# Patient Record
(demographics unavailable — no encounter records)

---

## 2024-10-10 NOTE — BEGINNING OF VISIT
[1] : 1) Little interest or pleasure doing things for several days (1) [0] : 2) Feeling down, depressed, or hopeless: Not at all (0) [PHQ-2 Negative] : PHQ-2 Negative [Pain Scale: ___] : On a scale of 1-10, today the patient's pain is a(n) [unfilled]. [Former] : Former [> 15 Years] : > 15 Years [Reviewed, no changes] : Reviewed, no changes

## 2024-10-11 NOTE — ASSESSMENT
[FreeTextEntry1] : Assessment and Plan: # H/O Bilateral invasive lobular carcinoma of the breast, ER/ME positive and HER2/deana negative, status post bilateral mastectomy, bilateral sentinel lymph node biopsy, and right axillary lymph node dissection, status post adjuvant chemotherapy and adjuvant chest wall radiotherapy, Completed 10-year of endocrine therapy with Letrozole in 7/2023.  - S/P b/l mastectomy. There is no palpable abnormality.  # Recurrent breast cancer(ER/ME/Her 2 negative Biopsy proven) with T6 vertebral body lesion S/P XRT(5/24) now presenting with new L1 lesion S/P XRT  9.24 - MRI spine on 3/11/24 showed a 2.7 cm lesion within the left aspect of the T6 vertebral body and 7 mm marrow replacing lesion within the T11 vertebral body.  - PET/CT on 3/28/24 showed Intense pathologic FDG uptake (SUV 9.3) coregistering with sclerotic lesion on the left side of T6 suspicious for biologic tumor activity. No other sites of abnormal FDG uptake.  - CT guided core bx of the lesion in T6 vertebral body on 4/17/24 showed metastatic carcinoma, favor breast primary, ER/ME negative. Her2 is negative.  - S/P palliative RT 2000cGy to T6 lesion and completed RT on 5/22/24. - She has oligo bone met on the imaging. However, the presence of occult mets cannot be excluded. She started Xeloda 1500 mg twice a day, one week on and one week off. Due to frequent diarrhea, will reduce Xeloda to 1000 mg q12h, one week on and one week off.  She has been on Xeloda for 3 months and developed multiple side effects. Will hold treatment for now. -- PET/CT for re-evaluation in 9.24 which showed interval resolution of uptake in T6 and New uptake in L1 and new pleural effusion. S/P XRT 5 fractions in 9.24. --Given no evidence of recurrent disease elsewhere will monitor for now and will repeat imaging in 2 months. --- Recurrent tumor is noted to be Triple negative. However receptor testing was performed  on Bone biopsy may not be accurate. Given her initial  tumor is positive for ER/ME positive will put her Letrozole in the meantime. -- Mild anemia due to chemo. Will monotor.  -- Order blood work: CBC, BMP, LFT, Mg, CEA and .  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 She has an appt to see neurosurgeon.   # H/O elevated serum calcium. MM panel showed no evidence of M-spike. Serum light chain ration is normal. Repeat serum calcium is normal and PTH is also normal.  # Osteopenia.  Encourage regular exercise. Her serum calcium level was mild elevated, 10.4 from latest blood work. Will monitor calcium today.  RTO for follow up in 4 weeks.

## 2024-10-11 NOTE — PHYSICAL EXAM
[Fully active, able to carry on all pre-disease performance without restriction] : Status 0 - Fully active, able to carry on all pre-disease performance without restriction [Normal] : affect appropriate [de-identified] :  Status post bilateral mastectomy and autologous tissue reconstruction.  There is no skin lesion.  No palpable axillary lymphadenopathy. [de-identified] : Lower abdominal fat necrosis. Abdominal scar present.  [de-identified] : right breast skin tag removed; biopsy benign

## 2024-10-11 NOTE — HISTORY OF PRESENT ILLNESS
[de-identified] : This 68-year-old female is here today for a followup visit.  She has a history of bilateral invasive lobular carcinoma, stage IIIA (pT1c N2a M0) in the right breast, and stage IA (pT1b N0 M0) in the left breast.  Both sided breast cancer were ER/SD positive and HER2/deana negative.  She had bilateral mastectomy, bilateral sentinel lymph node biopsy, and right axillary lymph node dissection at [facility] on 01/08/2013.  She received adjuvant chemotherapy with Adriamycin and Cytoxan followed by paclitaxel.  She also received post mastectomy adjuvant radiotherapy to right side chest wall.  She has been on adjuvant endocrine therapy with Femara since 7/2013  and has been tolerating well.  She had genetic test for BRCA mutations.  She is negative for BRCA1/2 mutations.  In 10/2014, she had a bone density at [facility], which showed osteopenia. Her latest bone density was done 7/21/16 which showed osteopenia. MRI of breasts was done on 1/30/17 which showed no MR evidence of malignancy in either breast. Recommendation: Unless otherwise indicated by clinical findings, annual screening mammography recommended. This can be alternated at 6 month intervals with bilateral screening breast MRI.\par   She has been taking calcium and vitamin D supplements. Latest colonoscopy 11/2016 pt states it was negative. Saw Dr. Garcia 1/2017.\par  She saw Dr. Rankin recently. She is scheduled to have a revision of her lower abdominal fat necrosis on 1/2018.\par  \par   [de-identified] : 6/8/18: She stopped letrozole few months ago as she was found to be in Afib by her cardiologist on Pre-op clearance for abdominal fat necrosis. However her cardiologist () told her that you can resume Letrozole. She had surgery for abdominal fat necrosis and she is going for surgery for incisional hernia by .   12/13/18: The patient is here for followup visit. She has been taking Letrozole daily (since 7/2013) and tolerating well. She had hernia repair surgery. She complains some pain in her knees. She had MRI breast in 1 2017. There was no suspicious finding.  6/14/19; The patient is here for followup visit. She has been taking Letrozole daily (since 7/2013) and tolerating well. She complains some pain in her knees. She does not have other new complains. She has mild elevated calcium. PHT was normal.  12/13/2019 :  The patient is here for follow up. She has been on letrozole daily and tolerating well. She had a bone density 12/2/109 which showed osteopenia of femur and hip. She is not taking calcium and vit D since her last calcium was elevated 10.7. She had a recent US breasts 12/2019 for breast pain which was negative. She was seen by her cardiologist and had blood work in the summer. She was told that she has severe anemia. She started her on iron pills. She denies any vaginal bleeding or rectal bleeding  6/12/2020: The patient is here for follow up. She had bilateral invasive lobular carcinoma of the breast, ER/AZ positive and HER2/deana negative, status post bilateral mastectomy, bilateral sentinel lymph node biopsy, and right axillary lymph node dissection in 1/2013, status post adjuvant chemotherapy and adjuvant chest wall radiotherapy, currently on adjuvant endocrine therapy with letrozole and tolerating well. She had a bone density 12/2/19 which showed osteopenia of femur and hip. She is not taking calcium and vit D since her last calcium was elevated 10.7. She had a recent US breasts 12/2019 for breast pain which was negative. She does not have new complains.  12/02/2020: MANOJ is here for follow up visit for bilateral invasive lobular carcinoma of the breast, ER/AZ positive and HER2/deana negative, status post bilateral mastectomy, bilateral sentinel lymph node biopsy, and right axillary lymph node dissection in 1/2013, status post adjuvant chemotherapy and adjuvant chest wall radiotherapy, currently on adjuvant endocrine therapy with letrozole and tolerating well besides mild arthralgia. She had a bone density 12/2/19 which showed osteopenia of femur and hip. She continues on Vitamin D daily. She denies any new breast symptoms at this time. In addition, patient was diagnosed with COVID in 3/2020.   06/02/2021: MANOJ is here for follow up visit for bilateral invasive lobular carcinoma of the breast, ER/AZ positive and HER2/deana negative, status post bilateral mastectomy, bilateral sentinel lymph node biopsy, and right axillary lymph node dissection in 1/2013, status post adjuvant chemotherapy and adjuvant chest wall radiotherapy, currently on adjuvant endocrine therapy with letrozole since 7/2013 and tolerating well besides mild arthralgia. She had a bone density 12/2/19 which showed osteopenia of femur and hip. She continues on Vitamin D daily. She denies any new breast symptoms at this time. she had right breast skin tag biopsied by dermatologist; which was benign.   1/5/22 MANOJ is here for follow up visit for bilateral invasive lobular carcinoma of the breast, ER/AZ positive and HER2/deana negative, status post bilateral mastectomy, bilateral sentinel lymph node biopsy, and right axillary lymph node dissection in 1/2013, status post adjuvant chemotherapy and adjuvant chest wall radiotherapy, currently on adjuvant endocrine therapy with letrozole since 7/2013 and tolerating well besides mild arthralgia. She had a bone density 12/7/21 which showed worsening osteopenia of femur and hip, T score -2.4. She is not complaint with Vitamin D, does not take calcium due to mildly elevated Calcium. She denies any new breast symptoms at this time. She was experiencing lower abdominal and pelvic pain in Sept had US which was unremarkable. In addition, had Thyroid US due to hypercalcemia which was unremarkable, In 8/2021 TSH 2.36, Calcium 10, and PTH was 78.   7/21/22: Manoj is here for follow up visit for bilateral invasive lobular carcinoma of the breast, ER/AZ positive and HER2/deana negative, status post bilateral mastectomy, bilateral sentinel lymph node biopsy, and right axillary lymph node dissection in 1/2013, status post adjuvant chemotherapy and adjuvant chest wall radiotherapy, currently on adjuvant endocrine therapy with letrozole since 7/2013 and tolerating well besides mild arthralgia. She had a bone density 12/7/21 which showed worsening osteopenia of femur and hip, T score -2.4. She takes Vitamin D but not calcium because her calcium has been mildly elevated. She was found to have large hiatal hernia and she has had on and off diarrhea for long time. She has been seen by Dr. Borjas.   01/26/2023 Manoj is here for follow up visit for bilateral invasive lobular carcinoma of the breast, ER/AZ positive and HER2/deana negative, status post bilateral mastectomy, bilateral sentinel lymph node biopsy, and right axillary lymph node dissection in 1/2013, status post adjuvant chemotherapy and adjuvant chest wall radiotherapy, currently on adjuvant endocrine therapy with letrozole since 7/2013 and tolerating well besides mild arthralgia. She had a bone density 12/7/21 which showed worsening osteopenia of femur and hip, T score -2.4. She takes Vitamin D but not calcium because her calcium has been mildly elevated. She was found to have large hiatal hernia and surgery on 11/21/22. Her Hb drooped to 9.5 g/dl after surgery. She was found to have high serum calcium but repeat calcium was normal. PTH was also within normal limit.  7/20/23 Manoj is here for follow up visit for bilateral invasive lobular carcinoma of the breast, ER/AZ positive and HER2/deana negative, status post bilateral mastectomy, bilateral sentinel lymph node biopsy, and right axillary lymph node dissection in 1/2013, status post adjuvant chemotherapy and adjuvant chest wall radiotherapy, currently on adjuvant endocrine therapy with letrozole since 7/2013 and tolerating well besides mild arthralgia. She had a bone density 12/7/21 which showed worsening osteopenia of femur and hip, T score -2.4. She takes Vitamin D but not calcium because her calcium has been mildly elevated. Repeat serum calcium normal, MM panel normal. She was found to have large hiatal hernia and surgery on 11/21/22. Her Hb drooped to 9.5 g/dl after surgery, now normal. After hernia repair she was having symptoms consistent with vagal nerve mediated gastroparesis, she then had endoscopic pyloromyotomy, symptoms resolved.   1/25/2024: Manoj is here for follow up visit for bilateral invasive lobular carcinoma of the breast, ER/AZ positive and HER2/deana negative, status post bilateral mastectomy, bilateral sentinel lymph node biopsy, and right axillary lymph node dissection in 1/2013, status post adjuvant chemotherapy and adjuvant chest wall radiotherapy, currently on adjuvant endocrine therapy with letrozole since 7/2013 and tolerating well besides mild arthralgia. She Completed 10 years treatment of Letrozole in 7/2023. She had a bone density 12/8/23 Showed:         ----------------------------------------------------------------- AP Spine (L1-L4)         0.998   -0.4      1.8     Normal Femoral Neck (Left) 0.552   -2.7     -0.7     Osteoporosis Total Hip (Left) 0.676   -2.2     -0.6     Osteopenia -which showed Osteoporosis of femur Neck and Osteopenia in the Hip. She takes Vitamin D.  After hernia repair, she was having symptoms consistent with vagal nerve mediated gastroparesis, she then had endoscopic pyloromyotomy, symptoms resolved.  She starts feeling better now.   3/29/24: Manoj is here for follow up visit. She has h/o bilateral invasive lobular carcinoma of the breast, ER/AZ positive and HER2/deana negative, status post bilateral mastectomy, bilateral sentinel lymph node biopsy, and right axillary lymph node dissection in 1/2013, status post adjuvant chemotherapy and adjuvant chest wall radiotherapy, currently on adjuvant endocrine therapy with letrozole since 7/2013. She had CT AP in 2/2024 for abdominal pain which incidentally showed a lesion in T6. She had MRI spine on 3/11/24 which showed a 2.7 cm lesion within the left aspect of the T6 vertebral body and 7 mm marrow replacing lesion within the T11 vertebral body.  PET/CT on 3/28/24 showed Intense pathologic FDG uptake (SUV 9.3) coregistering with sclerotic lesion on the left side of T6 suspicious for biologic tumor activity. No other sites of abnormal FDG uptake. She is here today to discuss further management plan.  4/29/24: Manoj is here for follow up visit. She has h/o bilateral invasive lobular carcinoma of the breast, ER/AZ positive and HER2/deana negative, status post bilateral mastectomy, bilateral sentinel lymph node biopsy, and right axillary lymph node dissection in 1/2013, status post adjuvant chemotherapy and adjuvant chest wall radiotherapy, Completed 10-year of endocrine therapy with Letrozole in 7/2023. She had CT AP in 2/2024 for abdominal pain which incidentally showed a lesion in T6. She had MRI spine on 3/11/24 which showed a 2.7 cm lesion within the left aspect of the T6 vertebral body and 7 mm marrow replacing lesion within the T11 vertebral body.  PET/CT on 3/28/24 showed Intense pathologic FDG uptake (SUV 9.3) coregistering with sclerotic lesion on the left side of T6 suspicious for biologic tumor activity. No other sites of abnormal FDG uptake. She had CT guided biopsy on 4/17/24 and is here today to discuss the result.     5/30/24: Manoj is here for follow up visit. She has h/o bilateral invasive lobular carcinoma of the breast, ER/AZ positive and HER2/deana negative, status post bilateral mastectomy, bilateral sentinel lymph node biopsy, and right axillary lymph node dissection in 1/2013, status post adjuvant chemotherapy and adjuvant chest wall radiotherapy, Completed 10-year of endocrine therapy with Letrozole in 7/2023. She had CT AP in 2/2024 for abdominal pain which incidentally showed a lesion in T6. She had MRI spine on 3/11/24 which showed a 2.7 cm lesion within the left aspect of the T6 vertebral body and 7 mm marrow replacing lesion within the T11 vertebral body.  PET/CT on 3/28/24 showed Intense pathologic FDG uptake (SUV 9.3) coregistering with sclerotic lesion on the left side of T6 suspicious for biologic tumor activity.  She had CT guided biopsy on 4/17/24 which showed metastatic carcinoma consistent from breast primary. She saw Dr. Anaya and received palliative RT 2000cGy to T6 lesion and completed RT on 5/22/24.  6/27/24: Manoj is here for follow up visit. She has h/o bilateral invasive lobular carcinoma of the breast, ER/AZ positive and HER2/deana negative, status post bilateral mastectomy, bilateral sentinel lymph node biopsy, and right axillary lymph node dissection in 1/2013, status post adjuvant chemotherapy and adjuvant chest wall radiotherapy, Completed 10-year of endocrine therapy with Letrozole in 7/2023. She had CT AP in 2/2024 for abdominal pain which incidentally showed a lesion in T6. She had MRI spine on 3/11/24 which showed a 2.7 cm lesion within the left aspect of the T6 vertebral body and 7 mm marrow replacing lesion within the T11 vertebral body.  PET/CT on 3/28/24 showed Intense pathologic FDG uptake (SUV 9.3) coregistering with sclerotic lesion on the left side of T6 suspicious for biologic tumor activity.  She had CT guided biopsy on 4/17/24 which showed metastatic carcinoma consistent from breast primary. She saw Dr. Anaya and received palliative RT 2000cGy to T6 lesion and completed RT on 5/22/24. She started on Xeloda and had 1st week treatment. She had loose stool but otherwise tolerated well.   08/01/24: bilateral sentinel lymph node biopsy, and right axillary lymph node dissection in 1/2013, status post adjuvant chemotherapy and adjuvant chest wall radiotherapy, Completed 10-year of endocrine therapy with Letrozole in 7/2023. She had CT AP in 2/2024 for abdominal pain which incidentally showed a lesion in T6. She had MRI spine on 3/11/24 which showed a 2.7 cm lesion within the left aspect of the T6 vertebral body and 7 mm marrow replacing lesion within the T11 vertebral body.  PET/CT on 3/28/24 showed Intense pathologic FDG uptake (SUV 9.3) coregistering with sclerotic lesion on the left side of T6 suspicious for biologic tumor activity.  She had CT guided biopsy on 4/17/24 which showed metastatic carcinoma consistent from breast primary. She saw Dr. Anaya and received palliative RT 2000cGy to T6 lesion and completed RT on 5/22/24. She started on Xeloda and had 1st week of June 2024. She is complaining of persistence diarrhea despite Imodium intake, currently on Xeloda 3tabs every 12 hrs for one week on and one week off.   8/29/24: Manoj is here for follow up visit. She has h/o bilateral invasive lobular carcinoma of the breast, ER/AZ positive and HER2/deana negative, status post bilateral mastectomy, bilateral sentinel lymph node biopsy, and right axillary lymph node dissection in 1/2013, status post adjuvant chemotherapy and adjuvant chest wall radiotherapy, Completed 10-year of endocrine therapy with Letrozole in 7/2023. She had CT AP in 2/2024 for abdominal pain which incidentally showed a lesion in T6. She had MRI spine on 3/11/24 which showed a 2.7 cm lesion within the left aspect of the T6 vertebral body and 7 mm marrow replacing lesion within the T11 vertebral body.  PET/CT on 3/28/24 showed Intense pathologic FDG uptake (SUV 9.3) coregistering with sclerotic lesion on the left side of T6 suspicious for biologic tumor activity.  She had CT guided biopsy on 4/17/24 which showed metastatic carcinoma consistent from breast primary. She saw Dr. Anaya and received palliative RT 2000cGy to T6 lesion and completed RT on 5/22/24. She has been on Xeloda and did not tolerate well. She complains diarrhea, weight loss and eye irritation.   10/10/24 Manoj is here for follow up visit. She has h/o bilateral invasive lobular carcinoma of the breast, ER/AZ positive and HER2/deana negative, status post bilateral mastectomy, bilateral sentinel lymph node biopsy, and right axillary lymph node dissection in 1/2013, status post adjuvant chemotherapy and adjuvant chest wall radiotherapy, Completed 10-year of endocrine therapy with Letrozole in 7/2023. She had CT AP in 2/2024 for abdominal pain which incidentally showed a lesion in T6. She had MRI spine on 3/11/24 which showed a 2.7 cm lesion within the left aspect of the T6 vertebral body and 7 mm marrow replacing lesion within the T11 vertebral body.  PET/CT on 3/28/24 showed Intense pathologic FDG uptake (SUV 9.3) coregistering with sclerotic lesion on the left side of T6 suspicious for biologic tumor activity.  She had CT guided biopsy on 4/17/24 which showed metastatic carcinoma consistent from breast primary. She saw Dr. Anaya and received palliative RT 2000cGy to T6 lesion and completed RT on 5/22/24. She has been on Xeloda and did not tolerate well. Xeloda was discontinued. She had a PET scan in 9.24 which showed interval resolution of uptake in T6 and New uptake in L1 and new pleural effussion. SHe completed XRT 5 fractions in 9.24.  She is here for follow up.

## 2024-10-11 NOTE — HISTORY OF PRESENT ILLNESS
[de-identified] : This 68-year-old female is here today for a followup visit.  She has a history of bilateral invasive lobular carcinoma, stage IIIA (pT1c N2a M0) in the right breast, and stage IA (pT1b N0 M0) in the left breast.  Both sided breast cancer were ER/WA positive and HER2/deana negative.  She had bilateral mastectomy, bilateral sentinel lymph node biopsy, and right axillary lymph node dissection at [facility] on 01/08/2013.  She received adjuvant chemotherapy with Adriamycin and Cytoxan followed by paclitaxel.  She also received post mastectomy adjuvant radiotherapy to right side chest wall.  She has been on adjuvant endocrine therapy with Femara since 7/2013  and has been tolerating well.  She had genetic test for BRCA mutations.  She is negative for BRCA1/2 mutations.  In 10/2014, she had a bone density at [facility], which showed osteopenia. Her latest bone density was done 7/21/16 which showed osteopenia. MRI of breasts was done on 1/30/17 which showed no MR evidence of malignancy in either breast. Recommendation: Unless otherwise indicated by clinical findings, annual screening mammography recommended. This can be alternated at 6 month intervals with bilateral screening breast MRI.\par   She has been taking calcium and vitamin D supplements. Latest colonoscopy 11/2016 pt states it was negative. Saw Dr. Garcia 1/2017.\par  She saw Dr. Rankin recently. She is scheduled to have a revision of her lower abdominal fat necrosis on 1/2018.\par  \par   [de-identified] : 6/8/18: She stopped letrozole few months ago as she was found to be in Afib by her cardiologist on Pre-op clearance for abdominal fat necrosis. However her cardiologist () told her that you can resume Letrozole. She had surgery for abdominal fat necrosis and she is going for surgery for incisional hernia by .   12/13/18: The patient is here for followup visit. She has been taking Letrozole daily (since 7/2013) and tolerating well. She had hernia repair surgery. She complains some pain in her knees. She had MRI breast in 1 2017. There was no suspicious finding.  6/14/19; The patient is here for followup visit. She has been taking Letrozole daily (since 7/2013) and tolerating well. She complains some pain in her knees. She does not have other new complains. She has mild elevated calcium. PHT was normal.  12/13/2019 :  The patient is here for follow up. She has been on letrozole daily and tolerating well. She had a bone density 12/2/109 which showed osteopenia of femur and hip. She is not taking calcium and vit D since her last calcium was elevated 10.7. She had a recent US breasts 12/2019 for breast pain which was negative. She was seen by her cardiologist and had blood work in the summer. She was told that she has severe anemia. She started her on iron pills. She denies any vaginal bleeding or rectal bleeding  6/12/2020: The patient is here for follow up. She had bilateral invasive lobular carcinoma of the breast, ER/VA positive and HER2/deana negative, status post bilateral mastectomy, bilateral sentinel lymph node biopsy, and right axillary lymph node dissection in 1/2013, status post adjuvant chemotherapy and adjuvant chest wall radiotherapy, currently on adjuvant endocrine therapy with letrozole and tolerating well. She had a bone density 12/2/19 which showed osteopenia of femur and hip. She is not taking calcium and vit D since her last calcium was elevated 10.7. She had a recent US breasts 12/2019 for breast pain which was negative. She does not have new complains.  12/02/2020: MANOJ is here for follow up visit for bilateral invasive lobular carcinoma of the breast, ER/VA positive and HER2/deana negative, status post bilateral mastectomy, bilateral sentinel lymph node biopsy, and right axillary lymph node dissection in 1/2013, status post adjuvant chemotherapy and adjuvant chest wall radiotherapy, currently on adjuvant endocrine therapy with letrozole and tolerating well besides mild arthralgia. She had a bone density 12/2/19 which showed osteopenia of femur and hip. She continues on Vitamin D daily. She denies any new breast symptoms at this time. In addition, patient was diagnosed with COVID in 3/2020.   06/02/2021: MANOJ is here for follow up visit for bilateral invasive lobular carcinoma of the breast, ER/VA positive and HER2/deana negative, status post bilateral mastectomy, bilateral sentinel lymph node biopsy, and right axillary lymph node dissection in 1/2013, status post adjuvant chemotherapy and adjuvant chest wall radiotherapy, currently on adjuvant endocrine therapy with letrozole since 7/2013 and tolerating well besides mild arthralgia. She had a bone density 12/2/19 which showed osteopenia of femur and hip. She continues on Vitamin D daily. She denies any new breast symptoms at this time. she had right breast skin tag biopsied by dermatologist; which was benign.   1/5/22 MANOJ is here for follow up visit for bilateral invasive lobular carcinoma of the breast, ER/VA positive and HER2/deana negative, status post bilateral mastectomy, bilateral sentinel lymph node biopsy, and right axillary lymph node dissection in 1/2013, status post adjuvant chemotherapy and adjuvant chest wall radiotherapy, currently on adjuvant endocrine therapy with letrozole since 7/2013 and tolerating well besides mild arthralgia. She had a bone density 12/7/21 which showed worsening osteopenia of femur and hip, T score -2.4. She is not complaint with Vitamin D, does not take calcium due to mildly elevated Calcium. She denies any new breast symptoms at this time. She was experiencing lower abdominal and pelvic pain in Sept had US which was unremarkable. In addition, had Thyroid US due to hypercalcemia which was unremarkable, In 8/2021 TSH 2.36, Calcium 10, and PTH was 78.   7/21/22: Manoj is here for follow up visit for bilateral invasive lobular carcinoma of the breast, ER/VA positive and HER2/deana negative, status post bilateral mastectomy, bilateral sentinel lymph node biopsy, and right axillary lymph node dissection in 1/2013, status post adjuvant chemotherapy and adjuvant chest wall radiotherapy, currently on adjuvant endocrine therapy with letrozole since 7/2013 and tolerating well besides mild arthralgia. She had a bone density 12/7/21 which showed worsening osteopenia of femur and hip, T score -2.4. She takes Vitamin D but not calcium because her calcium has been mildly elevated. She was found to have large hiatal hernia and she has had on and off diarrhea for long time. She has been seen by Dr. Borjas.   01/26/2023 Manoj is here for follow up visit for bilateral invasive lobular carcinoma of the breast, ER/VA positive and HER2/deana negative, status post bilateral mastectomy, bilateral sentinel lymph node biopsy, and right axillary lymph node dissection in 1/2013, status post adjuvant chemotherapy and adjuvant chest wall radiotherapy, currently on adjuvant endocrine therapy with letrozole since 7/2013 and tolerating well besides mild arthralgia. She had a bone density 12/7/21 which showed worsening osteopenia of femur and hip, T score -2.4. She takes Vitamin D but not calcium because her calcium has been mildly elevated. She was found to have large hiatal hernia and surgery on 11/21/22. Her Hb drooped to 9.5 g/dl after surgery. She was found to have high serum calcium but repeat calcium was normal. PTH was also within normal limit.  7/20/23 Manoj is here for follow up visit for bilateral invasive lobular carcinoma of the breast, ER/VA positive and HER2/deana negative, status post bilateral mastectomy, bilateral sentinel lymph node biopsy, and right axillary lymph node dissection in 1/2013, status post adjuvant chemotherapy and adjuvant chest wall radiotherapy, currently on adjuvant endocrine therapy with letrozole since 7/2013 and tolerating well besides mild arthralgia. She had a bone density 12/7/21 which showed worsening osteopenia of femur and hip, T score -2.4. She takes Vitamin D but not calcium because her calcium has been mildly elevated. Repeat serum calcium normal, MM panel normal. She was found to have large hiatal hernia and surgery on 11/21/22. Her Hb drooped to 9.5 g/dl after surgery, now normal. After hernia repair she was having symptoms consistent with vagal nerve mediated gastroparesis, she then had endoscopic pyloromyotomy, symptoms resolved.   1/25/2024: Manoj is here for follow up visit for bilateral invasive lobular carcinoma of the breast, ER/VA positive and HER2/deana negative, status post bilateral mastectomy, bilateral sentinel lymph node biopsy, and right axillary lymph node dissection in 1/2013, status post adjuvant chemotherapy and adjuvant chest wall radiotherapy, currently on adjuvant endocrine therapy with letrozole since 7/2013 and tolerating well besides mild arthralgia. She Completed 10 years treatment of Letrozole in 7/2023. She had a bone density 12/8/23 Showed:         ----------------------------------------------------------------- AP Spine (L1-L4)         0.998   -0.4      1.8     Normal Femoral Neck (Left) 0.552   -2.7     -0.7     Osteoporosis Total Hip (Left) 0.676   -2.2     -0.6     Osteopenia -which showed Osteoporosis of femur Neck and Osteopenia in the Hip. She takes Vitamin D.  After hernia repair, she was having symptoms consistent with vagal nerve mediated gastroparesis, she then had endoscopic pyloromyotomy, symptoms resolved.  She starts feeling better now.   3/29/24: Manoj is here for follow up visit. She has h/o bilateral invasive lobular carcinoma of the breast, ER/VA positive and HER2/deana negative, status post bilateral mastectomy, bilateral sentinel lymph node biopsy, and right axillary lymph node dissection in 1/2013, status post adjuvant chemotherapy and adjuvant chest wall radiotherapy, currently on adjuvant endocrine therapy with letrozole since 7/2013. She had CT AP in 2/2024 for abdominal pain which incidentally showed a lesion in T6. She had MRI spine on 3/11/24 which showed a 2.7 cm lesion within the left aspect of the T6 vertebral body and 7 mm marrow replacing lesion within the T11 vertebral body.  PET/CT on 3/28/24 showed Intense pathologic FDG uptake (SUV 9.3) coregistering with sclerotic lesion on the left side of T6 suspicious for biologic tumor activity. No other sites of abnormal FDG uptake. She is here today to discuss further management plan.  4/29/24: Manoj is here for follow up visit. She has h/o bilateral invasive lobular carcinoma of the breast, ER/VA positive and HER2/deana negative, status post bilateral mastectomy, bilateral sentinel lymph node biopsy, and right axillary lymph node dissection in 1/2013, status post adjuvant chemotherapy and adjuvant chest wall radiotherapy, Completed 10-year of endocrine therapy with Letrozole in 7/2023. She had CT AP in 2/2024 for abdominal pain which incidentally showed a lesion in T6. She had MRI spine on 3/11/24 which showed a 2.7 cm lesion within the left aspect of the T6 vertebral body and 7 mm marrow replacing lesion within the T11 vertebral body.  PET/CT on 3/28/24 showed Intense pathologic FDG uptake (SUV 9.3) coregistering with sclerotic lesion on the left side of T6 suspicious for biologic tumor activity. No other sites of abnormal FDG uptake. She had CT guided biopsy on 4/17/24 and is here today to discuss the result.     5/30/24: Manoj is here for follow up visit. She has h/o bilateral invasive lobular carcinoma of the breast, ER/VA positive and HER2/deana negative, status post bilateral mastectomy, bilateral sentinel lymph node biopsy, and right axillary lymph node dissection in 1/2013, status post adjuvant chemotherapy and adjuvant chest wall radiotherapy, Completed 10-year of endocrine therapy with Letrozole in 7/2023. She had CT AP in 2/2024 for abdominal pain which incidentally showed a lesion in T6. She had MRI spine on 3/11/24 which showed a 2.7 cm lesion within the left aspect of the T6 vertebral body and 7 mm marrow replacing lesion within the T11 vertebral body.  PET/CT on 3/28/24 showed Intense pathologic FDG uptake (SUV 9.3) coregistering with sclerotic lesion on the left side of T6 suspicious for biologic tumor activity.  She had CT guided biopsy on 4/17/24 which showed metastatic carcinoma consistent from breast primary. She saw Dr. Anaya and received palliative RT 2000cGy to T6 lesion and completed RT on 5/22/24.  6/27/24: Manoj is here for follow up visit. She has h/o bilateral invasive lobular carcinoma of the breast, ER/VA positive and HER2/deana negative, status post bilateral mastectomy, bilateral sentinel lymph node biopsy, and right axillary lymph node dissection in 1/2013, status post adjuvant chemotherapy and adjuvant chest wall radiotherapy, Completed 10-year of endocrine therapy with Letrozole in 7/2023. She had CT AP in 2/2024 for abdominal pain which incidentally showed a lesion in T6. She had MRI spine on 3/11/24 which showed a 2.7 cm lesion within the left aspect of the T6 vertebral body and 7 mm marrow replacing lesion within the T11 vertebral body.  PET/CT on 3/28/24 showed Intense pathologic FDG uptake (SUV 9.3) coregistering with sclerotic lesion on the left side of T6 suspicious for biologic tumor activity.  She had CT guided biopsy on 4/17/24 which showed metastatic carcinoma consistent from breast primary. She saw Dr. Anaya and received palliative RT 2000cGy to T6 lesion and completed RT on 5/22/24. She started on Xeloda and had 1st week treatment. She had loose stool but otherwise tolerated well.   08/01/24: bilateral sentinel lymph node biopsy, and right axillary lymph node dissection in 1/2013, status post adjuvant chemotherapy and adjuvant chest wall radiotherapy, Completed 10-year of endocrine therapy with Letrozole in 7/2023. She had CT AP in 2/2024 for abdominal pain which incidentally showed a lesion in T6. She had MRI spine on 3/11/24 which showed a 2.7 cm lesion within the left aspect of the T6 vertebral body and 7 mm marrow replacing lesion within the T11 vertebral body.  PET/CT on 3/28/24 showed Intense pathologic FDG uptake (SUV 9.3) coregistering with sclerotic lesion on the left side of T6 suspicious for biologic tumor activity.  She had CT guided biopsy on 4/17/24 which showed metastatic carcinoma consistent from breast primary. She saw Dr. Anaya and received palliative RT 2000cGy to T6 lesion and completed RT on 5/22/24. She started on Xeloda and had 1st week of June 2024. She is complaining of persistence diarrhea despite Imodium intake, currently on Xeloda 3tabs every 12 hrs for one week on and one week off.   8/29/24: Manoj is here for follow up visit. She has h/o bilateral invasive lobular carcinoma of the breast, ER/VA positive and HER2/deana negative, status post bilateral mastectomy, bilateral sentinel lymph node biopsy, and right axillary lymph node dissection in 1/2013, status post adjuvant chemotherapy and adjuvant chest wall radiotherapy, Completed 10-year of endocrine therapy with Letrozole in 7/2023. She had CT AP in 2/2024 for abdominal pain which incidentally showed a lesion in T6. She had MRI spine on 3/11/24 which showed a 2.7 cm lesion within the left aspect of the T6 vertebral body and 7 mm marrow replacing lesion within the T11 vertebral body.  PET/CT on 3/28/24 showed Intense pathologic FDG uptake (SUV 9.3) coregistering with sclerotic lesion on the left side of T6 suspicious for biologic tumor activity.  She had CT guided biopsy on 4/17/24 which showed metastatic carcinoma consistent from breast primary. She saw Dr. Anaya and received palliative RT 2000cGy to T6 lesion and completed RT on 5/22/24. She has been on Xeloda and did not tolerate well. She complains diarrhea, weight loss and eye irritation.   10/10/24 Manoj is here for follow up visit. She has h/o bilateral invasive lobular carcinoma of the breast, ER/VA positive and HER2/deana negative, status post bilateral mastectomy, bilateral sentinel lymph node biopsy, and right axillary lymph node dissection in 1/2013, status post adjuvant chemotherapy and adjuvant chest wall radiotherapy, Completed 10-year of endocrine therapy with Letrozole in 7/2023. She had CT AP in 2/2024 for abdominal pain which incidentally showed a lesion in T6. She had MRI spine on 3/11/24 which showed a 2.7 cm lesion within the left aspect of the T6 vertebral body and 7 mm marrow replacing lesion within the T11 vertebral body.  PET/CT on 3/28/24 showed Intense pathologic FDG uptake (SUV 9.3) coregistering with sclerotic lesion on the left side of T6 suspicious for biologic tumor activity.  She had CT guided biopsy on 4/17/24 which showed metastatic carcinoma consistent from breast primary. She saw Dr. Anaya and received palliative RT 2000cGy to T6 lesion and completed RT on 5/22/24. She has been on Xeloda and did not tolerate well. Xeloda was discontinued. She had a PET scan in 9.24 which showed interval resolution of uptake in T6 and New uptake in L1 and new pleural effussion. SHe completed XRT 5 fractions in 9.24.  She is here for follow up.

## 2024-10-11 NOTE — PHYSICAL EXAM
[Fully active, able to carry on all pre-disease performance without restriction] : Status 0 - Fully active, able to carry on all pre-disease performance without restriction [Normal] : affect appropriate [de-identified] :  Status post bilateral mastectomy and autologous tissue reconstruction.  There is no skin lesion.  No palpable axillary lymphadenopathy. [de-identified] : Lower abdominal fat necrosis. Abdominal scar present.  [de-identified] : right breast skin tag removed; biopsy benign

## 2024-10-11 NOTE — ASSESSMENT
[FreeTextEntry1] : Assessment and Plan: # H/O Bilateral invasive lobular carcinoma of the breast, ER/GA positive and HER2/deana negative, status post bilateral mastectomy, bilateral sentinel lymph node biopsy, and right axillary lymph node dissection, status post adjuvant chemotherapy and adjuvant chest wall radiotherapy, Completed 10-year of endocrine therapy with Letrozole in 7/2023.  - S/P b/l mastectomy. There is no palpable abnormality.  # Recurrent breast cancer(ER/GA/Her 2 negative Biopsy proven) with T6 vertebral body lesion S/P XRT(5/24) now presenting with new L1 lesion S/P XRT  9.24 - MRI spine on 3/11/24 showed a 2.7 cm lesion within the left aspect of the T6 vertebral body and 7 mm marrow replacing lesion within the T11 vertebral body.  - PET/CT on 3/28/24 showed Intense pathologic FDG uptake (SUV 9.3) coregistering with sclerotic lesion on the left side of T6 suspicious for biologic tumor activity. No other sites of abnormal FDG uptake.  - CT guided core bx of the lesion in T6 vertebral body on 4/17/24 showed metastatic carcinoma, favor breast primary, ER/GA negative. Her2 is negative.  - S/P palliative RT 2000cGy to T6 lesion and completed RT on 5/22/24. - She has oligo bone met on the imaging. However, the presence of occult mets cannot be excluded. She started Xeloda 1500 mg twice a day, one week on and one week off. Due to frequent diarrhea, will reduce Xeloda to 1000 mg q12h, one week on and one week off.  She has been on Xeloda for 3 months and developed multiple side effects. Will hold treatment for now. -- PET/CT for re-evaluation in 9.24 which showed interval resolution of uptake in T6 and New uptake in L1 and new pleural effusion. S/P XRT 5 fractions in 9.24. --Given no evidence of recurrent disease elsewhere will monitor for now and will repeat imaging in 2 months. --- Recurrent tumor is noted to be Triple negative. However receptor testing was performed  on Bone biopsy may not be accurate. Given her initial  tumor is positive for ER/GA positive will put her Letrozole in the meantime. -- Mild anemia due to chemo. Will monotor.  -- Order blood work: CBC, BMP, LFT, Mg, CEA and .  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 She has an appt to see neurosurgeon.   # H/O elevated serum calcium. MM panel showed no evidence of M-spike. Serum light chain ration is normal. Repeat serum calcium is normal and PTH is also normal.  # Osteopenia.  Encourage regular exercise. Her serum calcium level was mild elevated, 10.4 from latest blood work. Will monitor calcium today.  RTO for follow up in 4 weeks.

## 2024-10-11 NOTE — ASSESSMENT
[FreeTextEntry1] : Assessment and Plan: # H/O Bilateral invasive lobular carcinoma of the breast, ER/OR positive and HER2/deana negative, status post bilateral mastectomy, bilateral sentinel lymph node biopsy, and right axillary lymph node dissection, status post adjuvant chemotherapy and adjuvant chest wall radiotherapy, Completed 10-year of endocrine therapy with Letrozole in 7/2023.  - S/P b/l mastectomy. There is no palpable abnormality.  # Recurrent breast cancer(ER/OR/Her 2 negative Biopsy proven) with T6 vertebral body lesion S/P XRT(5/24) now presenting with new L1 lesion S/P XRT  9.24 - MRI spine on 3/11/24 showed a 2.7 cm lesion within the left aspect of the T6 vertebral body and 7 mm marrow replacing lesion within the T11 vertebral body.  - PET/CT on 3/28/24 showed Intense pathologic FDG uptake (SUV 9.3) coregistering with sclerotic lesion on the left side of T6 suspicious for biologic tumor activity. No other sites of abnormal FDG uptake.  - CT guided core bx of the lesion in T6 vertebral body on 4/17/24 showed metastatic carcinoma, favor breast primary, ER/OR negative. Her2 is negative.  - S/P palliative RT 2000cGy to T6 lesion and completed RT on 5/22/24. - She has oligo bone met on the imaging. However, the presence of occult mets cannot be excluded. She started Xeloda 1500 mg twice a day, one week on and one week off. Due to frequent diarrhea, will reduce Xeloda to 1000 mg q12h, one week on and one week off.  She has been on Xeloda for 3 months and developed multiple side effects. Will hold treatment for now. -- PET/CT for re-evaluation in 9.24 which showed interval resolution of uptake in T6 and New uptake in L1 and new pleural effusion. S/P XRT 5 fractions in 9.24. --Given no evidence of recurrent disease elsewhere will monitor for now and will repeat imaging in 2 months. --- Recurrent tumor is noted to be Triple negative. However receptor testing was performed  on Bone biopsy may not be accurate. Given her initial  tumor is positive for ER/OR positive will put her Letrozole in the meantime. -- Mild anemia due to chemo. Will monotor.  -- Order blood work: CBC, BMP, LFT, Mg, CEA and .  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 She has an appt to see neurosurgeon.   # H/O elevated serum calcium. MM panel showed no evidence of M-spike. Serum light chain ration is normal. Repeat serum calcium is normal and PTH is also normal.  # Osteopenia.  Encourage regular exercise. Her serum calcium level was mild elevated, 10.4 from latest blood work. Will monitor calcium today.  RTO for follow up in 4 weeks.

## 2024-10-11 NOTE — REVIEW OF SYSTEMS
[Fatigue] : fatigue [Joint Pain] : joint pain [Muscle Pain] : muscle pain [Negative] : Endocrine [FreeTextEntry2] : mild fatigue  [FreeTextEntry9] : Mild Back pain due to metastatic disease.

## 2024-10-11 NOTE — PHYSICAL EXAM
[Fully active, able to carry on all pre-disease performance without restriction] : Status 0 - Fully active, able to carry on all pre-disease performance without restriction [Normal] : affect appropriate [de-identified] :  Status post bilateral mastectomy and autologous tissue reconstruction.  There is no skin lesion.  No palpable axillary lymphadenopathy. [de-identified] : Lower abdominal fat necrosis. Abdominal scar present.  [de-identified] : right breast skin tag removed; biopsy benign

## 2024-10-11 NOTE — CONSULT LETTER
[Dear  ___] : Dear  [unfilled], [Courtesy Letter:] : I had the pleasure of seeing your patient, [unfilled], in my office today. [Please see my note below.] : Please see my note below. [Sincerely,] : Sincerely, [FreeTextEntry3] : Nathan Tovar MD [DrIsabel  ___] : Dr. HILLIARD

## 2024-10-11 NOTE — HISTORY OF PRESENT ILLNESS
[de-identified] : This 68-year-old female is here today for a followup visit.  She has a history of bilateral invasive lobular carcinoma, stage IIIA (pT1c N2a M0) in the right breast, and stage IA (pT1b N0 M0) in the left breast.  Both sided breast cancer were ER/VT positive and HER2/deana negative.  She had bilateral mastectomy, bilateral sentinel lymph node biopsy, and right axillary lymph node dissection at [facility] on 01/08/2013.  She received adjuvant chemotherapy with Adriamycin and Cytoxan followed by paclitaxel.  She also received post mastectomy adjuvant radiotherapy to right side chest wall.  She has been on adjuvant endocrine therapy with Femara since 7/2013  and has been tolerating well.  She had genetic test for BRCA mutations.  She is negative for BRCA1/2 mutations.  In 10/2014, she had a bone density at [facility], which showed osteopenia. Her latest bone density was done 7/21/16 which showed osteopenia. MRI of breasts was done on 1/30/17 which showed no MR evidence of malignancy in either breast. Recommendation: Unless otherwise indicated by clinical findings, annual screening mammography recommended. This can be alternated at 6 month intervals with bilateral screening breast MRI.\par   She has been taking calcium and vitamin D supplements. Latest colonoscopy 11/2016 pt states it was negative. Saw Dr. Garcia 1/2017.\par  She saw Dr. Rankin recently. She is scheduled to have a revision of her lower abdominal fat necrosis on 1/2018.\par  \par   [de-identified] : 6/8/18: She stopped letrozole few months ago as she was found to be in Afib by her cardiologist on Pre-op clearance for abdominal fat necrosis. However her cardiologist () told her that you can resume Letrozole. She had surgery for abdominal fat necrosis and she is going for surgery for incisional hernia by .   12/13/18: The patient is here for followup visit. She has been taking Letrozole daily (since 7/2013) and tolerating well. She had hernia repair surgery. She complains some pain in her knees. She had MRI breast in 1 2017. There was no suspicious finding.  6/14/19; The patient is here for followup visit. She has been taking Letrozole daily (since 7/2013) and tolerating well. She complains some pain in her knees. She does not have other new complains. She has mild elevated calcium. PHT was normal.  12/13/2019 :  The patient is here for follow up. She has been on letrozole daily and tolerating well. She had a bone density 12/2/109 which showed osteopenia of femur and hip. She is not taking calcium and vit D since her last calcium was elevated 10.7. She had a recent US breasts 12/2019 for breast pain which was negative. She was seen by her cardiologist and had blood work in the summer. She was told that she has severe anemia. She started her on iron pills. She denies any vaginal bleeding or rectal bleeding  6/12/2020: The patient is here for follow up. She had bilateral invasive lobular carcinoma of the breast, ER/VT positive and HER2/deana negative, status post bilateral mastectomy, bilateral sentinel lymph node biopsy, and right axillary lymph node dissection in 1/2013, status post adjuvant chemotherapy and adjuvant chest wall radiotherapy, currently on adjuvant endocrine therapy with letrozole and tolerating well. She had a bone density 12/2/19 which showed osteopenia of femur and hip. She is not taking calcium and vit D since her last calcium was elevated 10.7. She had a recent US breasts 12/2019 for breast pain which was negative. She does not have new complains.  12/02/2020: MANOJ is here for follow up visit for bilateral invasive lobular carcinoma of the breast, ER/VT positive and HER2/deana negative, status post bilateral mastectomy, bilateral sentinel lymph node biopsy, and right axillary lymph node dissection in 1/2013, status post adjuvant chemotherapy and adjuvant chest wall radiotherapy, currently on adjuvant endocrine therapy with letrozole and tolerating well besides mild arthralgia. She had a bone density 12/2/19 which showed osteopenia of femur and hip. She continues on Vitamin D daily. She denies any new breast symptoms at this time. In addition, patient was diagnosed with COVID in 3/2020.   06/02/2021: MANOJ is here for follow up visit for bilateral invasive lobular carcinoma of the breast, ER/VT positive and HER2/deana negative, status post bilateral mastectomy, bilateral sentinel lymph node biopsy, and right axillary lymph node dissection in 1/2013, status post adjuvant chemotherapy and adjuvant chest wall radiotherapy, currently on adjuvant endocrine therapy with letrozole since 7/2013 and tolerating well besides mild arthralgia. She had a bone density 12/2/19 which showed osteopenia of femur and hip. She continues on Vitamin D daily. She denies any new breast symptoms at this time. she had right breast skin tag biopsied by dermatologist; which was benign.   1/5/22 MANOJ is here for follow up visit for bilateral invasive lobular carcinoma of the breast, ER/VT positive and HER2/deana negative, status post bilateral mastectomy, bilateral sentinel lymph node biopsy, and right axillary lymph node dissection in 1/2013, status post adjuvant chemotherapy and adjuvant chest wall radiotherapy, currently on adjuvant endocrine therapy with letrozole since 7/2013 and tolerating well besides mild arthralgia. She had a bone density 12/7/21 which showed worsening osteopenia of femur and hip, T score -2.4. She is not complaint with Vitamin D, does not take calcium due to mildly elevated Calcium. She denies any new breast symptoms at this time. She was experiencing lower abdominal and pelvic pain in Sept had US which was unremarkable. In addition, had Thyroid US due to hypercalcemia which was unremarkable, In 8/2021 TSH 2.36, Calcium 10, and PTH was 78.   7/21/22: Manoj is here for follow up visit for bilateral invasive lobular carcinoma of the breast, ER/VT positive and HER2/deana negative, status post bilateral mastectomy, bilateral sentinel lymph node biopsy, and right axillary lymph node dissection in 1/2013, status post adjuvant chemotherapy and adjuvant chest wall radiotherapy, currently on adjuvant endocrine therapy with letrozole since 7/2013 and tolerating well besides mild arthralgia. She had a bone density 12/7/21 which showed worsening osteopenia of femur and hip, T score -2.4. She takes Vitamin D but not calcium because her calcium has been mildly elevated. She was found to have large hiatal hernia and she has had on and off diarrhea for long time. She has been seen by Dr. Borjas.   01/26/2023 Manoj is here for follow up visit for bilateral invasive lobular carcinoma of the breast, ER/VT positive and HER2/deana negative, status post bilateral mastectomy, bilateral sentinel lymph node biopsy, and right axillary lymph node dissection in 1/2013, status post adjuvant chemotherapy and adjuvant chest wall radiotherapy, currently on adjuvant endocrine therapy with letrozole since 7/2013 and tolerating well besides mild arthralgia. She had a bone density 12/7/21 which showed worsening osteopenia of femur and hip, T score -2.4. She takes Vitamin D but not calcium because her calcium has been mildly elevated. She was found to have large hiatal hernia and surgery on 11/21/22. Her Hb drooped to 9.5 g/dl after surgery. She was found to have high serum calcium but repeat calcium was normal. PTH was also within normal limit.  7/20/23 Manoj is here for follow up visit for bilateral invasive lobular carcinoma of the breast, ER/VT positive and HER2/deana negative, status post bilateral mastectomy, bilateral sentinel lymph node biopsy, and right axillary lymph node dissection in 1/2013, status post adjuvant chemotherapy and adjuvant chest wall radiotherapy, currently on adjuvant endocrine therapy with letrozole since 7/2013 and tolerating well besides mild arthralgia. She had a bone density 12/7/21 which showed worsening osteopenia of femur and hip, T score -2.4. She takes Vitamin D but not calcium because her calcium has been mildly elevated. Repeat serum calcium normal, MM panel normal. She was found to have large hiatal hernia and surgery on 11/21/22. Her Hb drooped to 9.5 g/dl after surgery, now normal. After hernia repair she was having symptoms consistent with vagal nerve mediated gastroparesis, she then had endoscopic pyloromyotomy, symptoms resolved.   1/25/2024: Manoj is here for follow up visit for bilateral invasive lobular carcinoma of the breast, ER/VT positive and HER2/deana negative, status post bilateral mastectomy, bilateral sentinel lymph node biopsy, and right axillary lymph node dissection in 1/2013, status post adjuvant chemotherapy and adjuvant chest wall radiotherapy, currently on adjuvant endocrine therapy with letrozole since 7/2013 and tolerating well besides mild arthralgia. She Completed 10 years treatment of Letrozole in 7/2023. She had a bone density 12/8/23 Showed:         ----------------------------------------------------------------- AP Spine (L1-L4)         0.998   -0.4      1.8     Normal Femoral Neck (Left) 0.552   -2.7     -0.7     Osteoporosis Total Hip (Left) 0.676   -2.2     -0.6     Osteopenia -which showed Osteoporosis of femur Neck and Osteopenia in the Hip. She takes Vitamin D.  After hernia repair, she was having symptoms consistent with vagal nerve mediated gastroparesis, she then had endoscopic pyloromyotomy, symptoms resolved.  She starts feeling better now.   3/29/24: Manoj is here for follow up visit. She has h/o bilateral invasive lobular carcinoma of the breast, ER/VT positive and HER2/deana negative, status post bilateral mastectomy, bilateral sentinel lymph node biopsy, and right axillary lymph node dissection in 1/2013, status post adjuvant chemotherapy and adjuvant chest wall radiotherapy, currently on adjuvant endocrine therapy with letrozole since 7/2013. She had CT AP in 2/2024 for abdominal pain which incidentally showed a lesion in T6. She had MRI spine on 3/11/24 which showed a 2.7 cm lesion within the left aspect of the T6 vertebral body and 7 mm marrow replacing lesion within the T11 vertebral body.  PET/CT on 3/28/24 showed Intense pathologic FDG uptake (SUV 9.3) coregistering with sclerotic lesion on the left side of T6 suspicious for biologic tumor activity. No other sites of abnormal FDG uptake. She is here today to discuss further management plan.  4/29/24: Manoj is here for follow up visit. She has h/o bilateral invasive lobular carcinoma of the breast, ER/VT positive and HER2/deana negative, status post bilateral mastectomy, bilateral sentinel lymph node biopsy, and right axillary lymph node dissection in 1/2013, status post adjuvant chemotherapy and adjuvant chest wall radiotherapy, Completed 10-year of endocrine therapy with Letrozole in 7/2023. She had CT AP in 2/2024 for abdominal pain which incidentally showed a lesion in T6. She had MRI spine on 3/11/24 which showed a 2.7 cm lesion within the left aspect of the T6 vertebral body and 7 mm marrow replacing lesion within the T11 vertebral body.  PET/CT on 3/28/24 showed Intense pathologic FDG uptake (SUV 9.3) coregistering with sclerotic lesion on the left side of T6 suspicious for biologic tumor activity. No other sites of abnormal FDG uptake. She had CT guided biopsy on 4/17/24 and is here today to discuss the result.     5/30/24: Manoj is here for follow up visit. She has h/o bilateral invasive lobular carcinoma of the breast, ER/VT positive and HER2/deana negative, status post bilateral mastectomy, bilateral sentinel lymph node biopsy, and right axillary lymph node dissection in 1/2013, status post adjuvant chemotherapy and adjuvant chest wall radiotherapy, Completed 10-year of endocrine therapy with Letrozole in 7/2023. She had CT AP in 2/2024 for abdominal pain which incidentally showed a lesion in T6. She had MRI spine on 3/11/24 which showed a 2.7 cm lesion within the left aspect of the T6 vertebral body and 7 mm marrow replacing lesion within the T11 vertebral body.  PET/CT on 3/28/24 showed Intense pathologic FDG uptake (SUV 9.3) coregistering with sclerotic lesion on the left side of T6 suspicious for biologic tumor activity.  She had CT guided biopsy on 4/17/24 which showed metastatic carcinoma consistent from breast primary. She saw Dr. Anaya and received palliative RT 2000cGy to T6 lesion and completed RT on 5/22/24.  6/27/24: Manoj is here for follow up visit. She has h/o bilateral invasive lobular carcinoma of the breast, ER/VT positive and HER2/deana negative, status post bilateral mastectomy, bilateral sentinel lymph node biopsy, and right axillary lymph node dissection in 1/2013, status post adjuvant chemotherapy and adjuvant chest wall radiotherapy, Completed 10-year of endocrine therapy with Letrozole in 7/2023. She had CT AP in 2/2024 for abdominal pain which incidentally showed a lesion in T6. She had MRI spine on 3/11/24 which showed a 2.7 cm lesion within the left aspect of the T6 vertebral body and 7 mm marrow replacing lesion within the T11 vertebral body.  PET/CT on 3/28/24 showed Intense pathologic FDG uptake (SUV 9.3) coregistering with sclerotic lesion on the left side of T6 suspicious for biologic tumor activity.  She had CT guided biopsy on 4/17/24 which showed metastatic carcinoma consistent from breast primary. She saw Dr. Anaya and received palliative RT 2000cGy to T6 lesion and completed RT on 5/22/24. She started on Xeloda and had 1st week treatment. She had loose stool but otherwise tolerated well.   08/01/24: bilateral sentinel lymph node biopsy, and right axillary lymph node dissection in 1/2013, status post adjuvant chemotherapy and adjuvant chest wall radiotherapy, Completed 10-year of endocrine therapy with Letrozole in 7/2023. She had CT AP in 2/2024 for abdominal pain which incidentally showed a lesion in T6. She had MRI spine on 3/11/24 which showed a 2.7 cm lesion within the left aspect of the T6 vertebral body and 7 mm marrow replacing lesion within the T11 vertebral body.  PET/CT on 3/28/24 showed Intense pathologic FDG uptake (SUV 9.3) coregistering with sclerotic lesion on the left side of T6 suspicious for biologic tumor activity.  She had CT guided biopsy on 4/17/24 which showed metastatic carcinoma consistent from breast primary. She saw Dr. Anaya and received palliative RT 2000cGy to T6 lesion and completed RT on 5/22/24. She started on Xeloda and had 1st week of June 2024. She is complaining of persistence diarrhea despite Imodium intake, currently on Xeloda 3tabs every 12 hrs for one week on and one week off.   8/29/24: Manoj is here for follow up visit. She has h/o bilateral invasive lobular carcinoma of the breast, ER/VT positive and HER2/deana negative, status post bilateral mastectomy, bilateral sentinel lymph node biopsy, and right axillary lymph node dissection in 1/2013, status post adjuvant chemotherapy and adjuvant chest wall radiotherapy, Completed 10-year of endocrine therapy with Letrozole in 7/2023. She had CT AP in 2/2024 for abdominal pain which incidentally showed a lesion in T6. She had MRI spine on 3/11/24 which showed a 2.7 cm lesion within the left aspect of the T6 vertebral body and 7 mm marrow replacing lesion within the T11 vertebral body.  PET/CT on 3/28/24 showed Intense pathologic FDG uptake (SUV 9.3) coregistering with sclerotic lesion on the left side of T6 suspicious for biologic tumor activity.  She had CT guided biopsy on 4/17/24 which showed metastatic carcinoma consistent from breast primary. She saw Dr. Anaya and received palliative RT 2000cGy to T6 lesion and completed RT on 5/22/24. She has been on Xeloda and did not tolerate well. She complains diarrhea, weight loss and eye irritation.   10/10/24 Manoj is here for follow up visit. She has h/o bilateral invasive lobular carcinoma of the breast, ER/VT positive and HER2/deana negative, status post bilateral mastectomy, bilateral sentinel lymph node biopsy, and right axillary lymph node dissection in 1/2013, status post adjuvant chemotherapy and adjuvant chest wall radiotherapy, Completed 10-year of endocrine therapy with Letrozole in 7/2023. She had CT AP in 2/2024 for abdominal pain which incidentally showed a lesion in T6. She had MRI spine on 3/11/24 which showed a 2.7 cm lesion within the left aspect of the T6 vertebral body and 7 mm marrow replacing lesion within the T11 vertebral body.  PET/CT on 3/28/24 showed Intense pathologic FDG uptake (SUV 9.3) coregistering with sclerotic lesion on the left side of T6 suspicious for biologic tumor activity.  She had CT guided biopsy on 4/17/24 which showed metastatic carcinoma consistent from breast primary. She saw Dr. Anaya and received palliative RT 2000cGy to T6 lesion and completed RT on 5/22/24. She has been on Xeloda and did not tolerate well. Xeloda was discontinued. She had a PET scan in 9.24 which showed interval resolution of uptake in T6 and New uptake in L1 and new pleural effussion. SHe completed XRT 5 fractions in 9.24.  She is here for follow up.

## 2024-10-15 NOTE — HISTORY OF PRESENT ILLNESS
[FreeTextEntry1] : Ms. ÁLVAREZ is a 73-year-old female presenting to the neurosurgery office today as a follow-up.  As a brief review, patient has a history of bilateral breast cancer.  She has completed radiation, currently on chemotherapy.  Spinal lesion incidentally identified in T6 on February imaging.  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 On 8/14/2024 for more dedicated study of the cerebral vessels was performed in the form of an MRA head and neck. It appreciated no evidence of aneurysm, large vessel occlusion or vascular malformation.  At this time ask cerebral angiogram was not warranted.  The patient understands that she may follow-up on an as-needed basis going forward and denied any questions at this time.

## 2024-10-15 NOTE — END OF VISIT
[FreeTextEntry3] :  I, Dr Nielsen personally performed the evaluation and management (E/M) services for this established patient who presents today with (a) new problem(s)/exacerbation of (an) existing condition(s).  That E/M includes conducting the examination, assessing all new/exacerbated conditions, and establishing a new plan of care.  Today, my NICHOL was here to observe my evaluation and management services for this new problem/exacerbated condition to be followed going forward.

## 2024-10-15 NOTE — ASSESSMENT
[FreeTextEntry1] : 73-year-old female presents the office today to review results of most recent MRA.  Prior workup incidentally identified what was believed to be a cerebral aneurysm however MRA confirmed no evidence of aneurysm, large vessel occlusion or vascular malformation.  No further imaging warranted.  Patient may follow-up as needed going forward.  No concerns were identified during the visit and we thank her for allowing us to be a part of her care team once again.   Anamaria Newman MS, FNP-BC Uday Nielsen MD, CS

## 2024-11-17 NOTE — CONSULT LETTER
[Dear  ___] : Dear  [unfilled], [Courtesy Letter:] : I had the pleasure of seeing your patient, [unfilled], in my office today. [Please see my note below.] : Please see my note below. [Sincerely,] : Sincerely, [DrIsabel  ___] : Dr. HILLIARD [FreeTextEntry3] : Nathan Tovar MD

## 2024-11-17 NOTE — PHYSICAL EXAM
[Fully active, able to carry on all pre-disease performance without restriction] : Status 0 - Fully active, able to carry on all pre-disease performance without restriction [Normal] : affect appropriate [de-identified] :  Status post bilateral mastectomy and autologous tissue reconstruction.  There is no skin lesion.  No palpable axillary lymphadenopathy. [de-identified] : Lower abdominal fat necrosis. Abdominal scar present.  [de-identified] : right breast skin tag removed; biopsy benign

## 2024-11-17 NOTE — PHYSICAL EXAM
[Fully active, able to carry on all pre-disease performance without restriction] : Status 0 - Fully active, able to carry on all pre-disease performance without restriction [Normal] : affect appropriate [de-identified] :  Status post bilateral mastectomy and autologous tissue reconstruction.  There is no skin lesion.  No palpable axillary lymphadenopathy. [de-identified] : Lower abdominal fat necrosis. Abdominal scar present.  [de-identified] : right breast skin tag removed; biopsy benign

## 2024-11-17 NOTE — ASSESSMENT
[FreeTextEntry1] : Assessment and Plan: # H/O Bilateral invasive lobular carcinoma of the breast, ER/AK positive and HER2/deana negative, status post bilateral mastectomy, bilateral sentinel lymph node biopsy, and right axillary lymph node dissection, status post adjuvant chemotherapy and adjuvant chest wall radiotherapy, Completed 10-year of endocrine therapy with Letrozole in 7/2023.  - S/P b/l mastectomy. There is no palpable abnormality.  # Recurrent breast cancer(ER/AK/Her 2 negative Biopsy proven) with T6 vertebral body lesion S/P XRT(5/24) now presenting with new L1 lesion S/P XRT  9.24 - MRI spine on 3/11/24 showed a 2.7 cm lesion within the left aspect of the T6 vertebral body and 7 mm marrow replacing lesion within the T11 vertebral body.  - PET/CT on 3/28/24 showed Intense pathologic FDG uptake (SUV 9.3) coregistering with sclerotic lesion on the left side of T6 suspicious for biologic tumor activity. No other sites of abnormal FDG uptake.  - CT guided core bx of the lesion in T6 vertebral body on 4/17/24 showed metastatic carcinoma, favor breast primary, ER/AK negative. Her2 is negative.  - S/P palliative RT 2000cGy to T6 lesion and completed RT on 5/22/24. - She took Xeloda for 3 months, 1500 mg twice a day, one week on and one week off. Due to frequent diarrhea, will reduce Xeloda to 1000 mg q12h, one week on and one week off.  She developed multiple side effects and Xeloda was discontinued.  -- PET/CT for re-evaluation in 9/16/24 showed interval resolution of uptake in T6 and New uptake in L1 and new pleural effusion.  -- MRI of lumbar spine 9/25/24 showed infiltrative enhancing bone marrow signal abnormality within the T11 and L1 vertebral bodies most compatible with osseous metastatic disease. Mild compression deformity of L1, likely pathologic. -- She received XRT 5 fractions 2000cGy to Vertebrae, Lumbar. -- Discussed further management options. Recurrent breast cancer in the spine was triple negative. She had palliative RT x 2. Currently, there is no evidence of visceral disease or other site disease. She will continue  observation and have repeat imaging in 3 months. Since her initial breast cancer was hormone receptor was positive and bone met biopsy could have inadequate IHC staining for hormone receptor, she has been taking Letrozole while waiting for next imaging.   -- Right side pleural effusion is too small to tap now. Will monitor upon next imaging.  -- Order blood work: CBC, BMP, LFT, Mg, CEA and .  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 She has an appt to see neurosurgeon.   # H/O elevated serum calcium. MM panel showed no evidence of M-spike. Serum light chain ration is normal. Repeat serum calcium is normal and PTH is also normal.  # Osteopenia.  Encourage regular exercise. Her serum calcium level was mild elevated, 10.4 from latest blood work. Will monitor calcium today.  RTO for follow up in 4 weeks.

## 2024-11-17 NOTE — HISTORY OF PRESENT ILLNESS
[de-identified] : This 68-year-old female is here today for a followup visit.  She has a history of bilateral invasive lobular carcinoma, stage IIIA (pT1c N2a M0) in the right breast, and stage IA (pT1b N0 M0) in the left breast.  Both sided breast cancer were ER/FL positive and HER2/deana negative.  She had bilateral mastectomy, bilateral sentinel lymph node biopsy, and right axillary lymph node dissection at [facility] on 01/08/2013.  She received adjuvant chemotherapy with Adriamycin and Cytoxan followed by paclitaxel.  She also received post mastectomy adjuvant radiotherapy to right side chest wall.  She has been on adjuvant endocrine therapy with Femara since 7/2013  and has been tolerating well.  She had genetic test for BRCA mutations.  She is negative for BRCA1/2 mutations.  In 10/2014, she had a bone density at [facility], which showed osteopenia. Her latest bone density was done 7/21/16 which showed osteopenia. MRI of breasts was done on 1/30/17 which showed no MR evidence of malignancy in either breast. Recommendation: Unless otherwise indicated by clinical findings, annual screening mammography recommended. This can be alternated at 6 month intervals with bilateral screening breast MRI.\par   She has been taking calcium and vitamin D supplements. Latest colonoscopy 11/2016 pt states it was negative. Saw Dr. Garcia 1/2017.\par  She saw Dr. Rankin recently. She is scheduled to have a revision of her lower abdominal fat necrosis on 1/2018.\par  \par   [de-identified] : 6/8/18: She stopped letrozole few months ago as she was found to be in Afib by her cardiologist on Pre-op clearance for abdominal fat necrosis. However her cardiologist () told her that you can resume Letrozole. She had surgery for abdominal fat necrosis and she is going for surgery for incisional hernia by .   12/13/18: The patient is here for followup visit. She has been taking Letrozole daily (since 7/2013) and tolerating well. She had hernia repair surgery. She complains some pain in her knees. She had MRI breast in 1 2017. There was no suspicious finding.  6/14/19; The patient is here for followup visit. She has been taking Letrozole daily (since 7/2013) and tolerating well. She complains some pain in her knees. She does not have other new complains. She has mild elevated calcium. PHT was normal.  12/13/2019 :  The patient is here for follow up. She has been on letrozole daily and tolerating well. She had a bone density 12/2/109 which showed osteopenia of femur and hip. She is not taking calcium and vit D since her last calcium was elevated 10.7. She had a recent US breasts 12/2019 for breast pain which was negative. She was seen by her cardiologist and had blood work in the summer. She was told that she has severe anemia. She started her on iron pills. She denies any vaginal bleeding or rectal bleeding  6/12/2020: The patient is here for follow up. She had bilateral invasive lobular carcinoma of the breast, ER/DE positive and HER2/deana negative, status post bilateral mastectomy, bilateral sentinel lymph node biopsy, and right axillary lymph node dissection in 1/2013, status post adjuvant chemotherapy and adjuvant chest wall radiotherapy, currently on adjuvant endocrine therapy with letrozole and tolerating well. She had a bone density 12/2/19 which showed osteopenia of femur and hip. She is not taking calcium and vit D since her last calcium was elevated 10.7. She had a recent US breasts 12/2019 for breast pain which was negative. She does not have new complains.  12/02/2020: MANOJ is here for follow up visit for bilateral invasive lobular carcinoma of the breast, ER/DE positive and HER2/deana negative, status post bilateral mastectomy, bilateral sentinel lymph node biopsy, and right axillary lymph node dissection in 1/2013, status post adjuvant chemotherapy and adjuvant chest wall radiotherapy, currently on adjuvant endocrine therapy with letrozole and tolerating well besides mild arthralgia. She had a bone density 12/2/19 which showed osteopenia of femur and hip. She continues on Vitamin D daily. She denies any new breast symptoms at this time. In addition, patient was diagnosed with COVID in 3/2020.   06/02/2021: MANOJ is here for follow up visit for bilateral invasive lobular carcinoma of the breast, ER/DE positive and HER2/deana negative, status post bilateral mastectomy, bilateral sentinel lymph node biopsy, and right axillary lymph node dissection in 1/2013, status post adjuvant chemotherapy and adjuvant chest wall radiotherapy, currently on adjuvant endocrine therapy with letrozole since 7/2013 and tolerating well besides mild arthralgia. She had a bone density 12/2/19 which showed osteopenia of femur and hip. She continues on Vitamin D daily. She denies any new breast symptoms at this time. she had right breast skin tag biopsied by dermatologist; which was benign.   1/5/22 MANOJ is here for follow up visit for bilateral invasive lobular carcinoma of the breast, ER/DE positive and HER2/deana negative, status post bilateral mastectomy, bilateral sentinel lymph node biopsy, and right axillary lymph node dissection in 1/2013, status post adjuvant chemotherapy and adjuvant chest wall radiotherapy, currently on adjuvant endocrine therapy with letrozole since 7/2013 and tolerating well besides mild arthralgia. She had a bone density 12/7/21 which showed worsening osteopenia of femur and hip, T score -2.4. She is not complaint with Vitamin D, does not take calcium due to mildly elevated Calcium. She denies any new breast symptoms at this time. She was experiencing lower abdominal and pelvic pain in Sept had US which was unremarkable. In addition, had Thyroid US due to hypercalcemia which was unremarkable, In 8/2021 TSH 2.36, Calcium 10, and PTH was 78.   7/21/22: Manoj is here for follow up visit for bilateral invasive lobular carcinoma of the breast, ER/DE positive and HER2/deana negative, status post bilateral mastectomy, bilateral sentinel lymph node biopsy, and right axillary lymph node dissection in 1/2013, status post adjuvant chemotherapy and adjuvant chest wall radiotherapy, currently on adjuvant endocrine therapy with letrozole since 7/2013 and tolerating well besides mild arthralgia. She had a bone density 12/7/21 which showed worsening osteopenia of femur and hip, T score -2.4. She takes Vitamin D but not calcium because her calcium has been mildly elevated. She was found to have large hiatal hernia and she has had on and off diarrhea for long time. She has been seen by Dr. Borjas.   01/26/2023 Manoj is here for follow up visit for bilateral invasive lobular carcinoma of the breast, ER/DE positive and HER2/deana negative, status post bilateral mastectomy, bilateral sentinel lymph node biopsy, and right axillary lymph node dissection in 1/2013, status post adjuvant chemotherapy and adjuvant chest wall radiotherapy, currently on adjuvant endocrine therapy with letrozole since 7/2013 and tolerating well besides mild arthralgia. She had a bone density 12/7/21 which showed worsening osteopenia of femur and hip, T score -2.4. She takes Vitamin D but not calcium because her calcium has been mildly elevated. She was found to have large hiatal hernia and surgery on 11/21/22. Her Hb drooped to 9.5 g/dl after surgery. She was found to have high serum calcium but repeat calcium was normal. PTH was also within normal limit.  7/20/23 Manoj is here for follow up visit for bilateral invasive lobular carcinoma of the breast, ER/DE positive and HER2/deana negative, status post bilateral mastectomy, bilateral sentinel lymph node biopsy, and right axillary lymph node dissection in 1/2013, status post adjuvant chemotherapy and adjuvant chest wall radiotherapy, currently on adjuvant endocrine therapy with letrozole since 7/2013 and tolerating well besides mild arthralgia. She had a bone density 12/7/21 which showed worsening osteopenia of femur and hip, T score -2.4. She takes Vitamin D but not calcium because her calcium has been mildly elevated. Repeat serum calcium normal, MM panel normal. She was found to have large hiatal hernia and surgery on 11/21/22. Her Hb drooped to 9.5 g/dl after surgery, now normal. After hernia repair she was having symptoms consistent with vagal nerve mediated gastroparesis, she then had endoscopic pyloromyotomy, symptoms resolved.   1/25/2024: Manoj is here for follow up visit for bilateral invasive lobular carcinoma of the breast, ER/DE positive and HER2/deana negative, status post bilateral mastectomy, bilateral sentinel lymph node biopsy, and right axillary lymph node dissection in 1/2013, status post adjuvant chemotherapy and adjuvant chest wall radiotherapy, currently on adjuvant endocrine therapy with letrozole since 7/2013 and tolerating well besides mild arthralgia. She Completed 10 years treatment of Letrozole in 7/2023. She had a bone density 12/8/23 Showed:         ----------------------------------------------------------------- AP Spine (L1-L4)         0.998   -0.4      1.8     Normal Femoral Neck (Left) 0.552   -2.7     -0.7     Osteoporosis Total Hip (Left) 0.676   -2.2     -0.6     Osteopenia -which showed Osteoporosis of femur Neck and Osteopenia in the Hip. She takes Vitamin D.  After hernia repair, she was having symptoms consistent with vagal nerve mediated gastroparesis, she then had endoscopic pyloromyotomy, symptoms resolved.  She starts feeling better now.   3/29/24: Manoj is here for follow up visit. She has h/o bilateral invasive lobular carcinoma of the breast, ER/DE positive and HER2/deana negative, status post bilateral mastectomy, bilateral sentinel lymph node biopsy, and right axillary lymph node dissection in 1/2013, status post adjuvant chemotherapy and adjuvant chest wall radiotherapy, currently on adjuvant endocrine therapy with letrozole since 7/2013. She had CT AP in 2/2024 for abdominal pain which incidentally showed a lesion in T6. She had MRI spine on 3/11/24 which showed a 2.7 cm lesion within the left aspect of the T6 vertebral body and 7 mm marrow replacing lesion within the T11 vertebral body.  PET/CT on 3/28/24 showed Intense pathologic FDG uptake (SUV 9.3) coregistering with sclerotic lesion on the left side of T6 suspicious for biologic tumor activity. No other sites of abnormal FDG uptake. She is here today to discuss further management plan.  4/29/24: Manoj is here for follow up visit. She has h/o bilateral invasive lobular carcinoma of the breast, ER/DE positive and HER2/deana negative, status post bilateral mastectomy, bilateral sentinel lymph node biopsy, and right axillary lymph node dissection in 1/2013, status post adjuvant chemotherapy and adjuvant chest wall radiotherapy, Completed 10-year of endocrine therapy with Letrozole in 7/2023. She had CT AP in 2/2024 for abdominal pain which incidentally showed a lesion in T6. She had MRI spine on 3/11/24 which showed a 2.7 cm lesion within the left aspect of the T6 vertebral body and 7 mm marrow replacing lesion within the T11 vertebral body.  PET/CT on 3/28/24 showed Intense pathologic FDG uptake (SUV 9.3) coregistering with sclerotic lesion on the left side of T6 suspicious for biologic tumor activity. No other sites of abnormal FDG uptake. She had CT guided biopsy on 4/17/24 and is here today to discuss the result.     5/30/24: Manoj is here for follow up visit. She has h/o bilateral invasive lobular carcinoma of the breast, ER/DE positive and HER2/deana negative, status post bilateral mastectomy, bilateral sentinel lymph node biopsy, and right axillary lymph node dissection in 1/2013, status post adjuvant chemotherapy and adjuvant chest wall radiotherapy, Completed 10-year of endocrine therapy with Letrozole in 7/2023. She had CT AP in 2/2024 for abdominal pain which incidentally showed a lesion in T6. She had MRI spine on 3/11/24 which showed a 2.7 cm lesion within the left aspect of the T6 vertebral body and 7 mm marrow replacing lesion within the T11 vertebral body.  PET/CT on 3/28/24 showed Intense pathologic FDG uptake (SUV 9.3) coregistering with sclerotic lesion on the left side of T6 suspicious for biologic tumor activity.  She had CT guided biopsy on 4/17/24 which showed metastatic carcinoma consistent from breast primary. She saw Dr. Anaya and received palliative RT 2000cGy to T6 lesion and completed RT on 5/22/24.  6/27/24: Manoj is here for follow up visit. She has h/o bilateral invasive lobular carcinoma of the breast, ER/DE positive and HER2/deana negative, status post bilateral mastectomy, bilateral sentinel lymph node biopsy, and right axillary lymph node dissection in 1/2013, status post adjuvant chemotherapy and adjuvant chest wall radiotherapy, Completed 10-year of endocrine therapy with Letrozole in 7/2023. She had CT AP in 2/2024 for abdominal pain which incidentally showed a lesion in T6. She had MRI spine on 3/11/24 which showed a 2.7 cm lesion within the left aspect of the T6 vertebral body and 7 mm marrow replacing lesion within the T11 vertebral body.  PET/CT on 3/28/24 showed Intense pathologic FDG uptake (SUV 9.3) coregistering with sclerotic lesion on the left side of T6 suspicious for biologic tumor activity.  She had CT guided biopsy on 4/17/24 which showed metastatic carcinoma consistent from breast primary. She saw Dr. Anaya and received palliative RT 2000cGy to T6 lesion and completed RT on 5/22/24. She started on Xeloda and had 1st week treatment. She had loose stool but otherwise tolerated well.   08/01/24: bilateral sentinel lymph node biopsy, and right axillary lymph node dissection in 1/2013, status post adjuvant chemotherapy and adjuvant chest wall radiotherapy, Completed 10-year of endocrine therapy with Letrozole in 7/2023. She had CT AP in 2/2024 for abdominal pain which incidentally showed a lesion in T6. She had MRI spine on 3/11/24 which showed a 2.7 cm lesion within the left aspect of the T6 vertebral body and 7 mm marrow replacing lesion within the T11 vertebral body.  PET/CT on 3/28/24 showed Intense pathologic FDG uptake (SUV 9.3) coregistering with sclerotic lesion on the left side of T6 suspicious for biologic tumor activity.  She had CT guided biopsy on 4/17/24 which showed metastatic carcinoma consistent from breast primary. She saw Dr. Anaya and received palliative RT 2000cGy to T6 lesion and completed RT on 5/22/24. She started on Xeloda and had 1st week of June 2024. She is complaining of persistence diarrhea despite Imodium intake, currently on Xeloda 3tabs every 12 hrs for one week on and one week off.   8/29/24: Manoj is here for follow up visit. She has h/o bilateral invasive lobular carcinoma of the breast, ER/DE positive and HER2/deana negative, status post bilateral mastectomy, bilateral sentinel lymph node biopsy, and right axillary lymph node dissection in 1/2013, status post adjuvant chemotherapy and adjuvant chest wall radiotherapy, Completed 10-year of endocrine therapy with Letrozole in 7/2023. She had CT AP in 2/2024 for abdominal pain which incidentally showed a lesion in T6. She had MRI spine on 3/11/24 which showed a 2.7 cm lesion within the left aspect of the T6 vertebral body and 7 mm marrow replacing lesion within the T11 vertebral body.  PET/CT on 3/28/24 showed Intense pathologic FDG uptake (SUV 9.3) coregistering with sclerotic lesion on the left side of T6 suspicious for biologic tumor activity.  She had CT guided biopsy on 4/17/24 which showed metastatic carcinoma consistent from breast primary. She saw Dr. Anaya and received palliative RT 2000cGy to T6 lesion and completed RT on 5/22/24. She has been on Xeloda and did not tolerate well. She complains diarrhea, weight loss and eye irritation.   10/10/24 Manoj is here for follow up visit. She has h/o bilateral invasive lobular carcinoma of the breast, ER/DE positive and HER2/deana negative, status post bilateral mastectomy, bilateral sentinel lymph node biopsy, and right axillary lymph node dissection in 1/2013, status post adjuvant chemotherapy and adjuvant chest wall radiotherapy, Completed 10-year of endocrine therapy with Letrozole in 7/2023. She had CT AP in 2/2024 for abdominal pain which incidentally showed a lesion in T6. She had MRI spine on 3/11/24 which showed a 2.7 cm lesion within the left aspect of the T6 vertebral body and 7 mm marrow replacing lesion within the T11 vertebral body.  PET/CT on 3/28/24 showed Intense pathologic FDG uptake (SUV 9.3) coregistering with sclerotic lesion on the left side of T6 suspicious for biologic tumor activity.  She had CT guided biopsy on 4/17/24 which showed metastatic carcinoma consistent from breast primary. She saw Dr. Anaya and received palliative RT 2000cGy to T6 lesion and completed RT on 5/22/24. She took Xeloda for 3 months and did not tolerate well. Xeloda was discontinued. She had a PET scan in 9.24 which showed interval resolution of uptake in T6 and New uptake in L1 and new pleural effussion. She completed XRT 5 fractions in 9.24.  She is here for follow up.  11/11/24 Manoj is here for follow up visit. She has h/o bilateral invasive lobular carcinoma of the breast, ER/DE positive and HER2/deana negative, status post bilateral mastectomy, bilateral sentinel lymph node biopsy, and right axillary lymph node dissection in 1/2013, status post adjuvant chemotherapy and adjuvant chest wall radiotherapy, Completed 10-year of endocrine therapy with Letrozole in 7/2023. She developed recurrent disease with a lesion in T6 in 2/2024. CT guided biopsy on 4/17/24 showed metastatic carcinoma consistent from breast primary. She saw Dr. Anaya and received palliative RT 2000cGy to T6 lesion and completed RT on 5/22/24. She took Xeloda for 3 months and did not tolerate well. Xeloda was discontinued. She had repeat PET/CT in 9.24 which showed interval resolution of uptake in T6 but new uptake in L1 and new pleural effusion. She completed XRT 5 fractions. She complains low back pain.

## 2024-11-17 NOTE — HISTORY OF PRESENT ILLNESS
[de-identified] : This 68-year-old female is here today for a followup visit.  She has a history of bilateral invasive lobular carcinoma, stage IIIA (pT1c N2a M0) in the right breast, and stage IA (pT1b N0 M0) in the left breast.  Both sided breast cancer were ER/NV positive and HER2/deana negative.  She had bilateral mastectomy, bilateral sentinel lymph node biopsy, and right axillary lymph node dissection at [facility] on 01/08/2013.  She received adjuvant chemotherapy with Adriamycin and Cytoxan followed by paclitaxel.  She also received post mastectomy adjuvant radiotherapy to right side chest wall.  She has been on adjuvant endocrine therapy with Femara since 7/2013  and has been tolerating well.  She had genetic test for BRCA mutations.  She is negative for BRCA1/2 mutations.  In 10/2014, she had a bone density at [facility], which showed osteopenia. Her latest bone density was done 7/21/16 which showed osteopenia. MRI of breasts was done on 1/30/17 which showed no MR evidence of malignancy in either breast. Recommendation: Unless otherwise indicated by clinical findings, annual screening mammography recommended. This can be alternated at 6 month intervals with bilateral screening breast MRI.\par   She has been taking calcium and vitamin D supplements. Latest colonoscopy 11/2016 pt states it was negative. Saw Dr. Garcia 1/2017.\par  She saw Dr. Rankin recently. She is scheduled to have a revision of her lower abdominal fat necrosis on 1/2018.\par  \par   [de-identified] : 6/8/18: She stopped letrozole few months ago as she was found to be in Afib by her cardiologist on Pre-op clearance for abdominal fat necrosis. However her cardiologist () told her that you can resume Letrozole. She had surgery for abdominal fat necrosis and she is going for surgery for incisional hernia by .   12/13/18: The patient is here for followup visit. She has been taking Letrozole daily (since 7/2013) and tolerating well. She had hernia repair surgery. She complains some pain in her knees. She had MRI breast in 1 2017. There was no suspicious finding.  6/14/19; The patient is here for followup visit. She has been taking Letrozole daily (since 7/2013) and tolerating well. She complains some pain in her knees. She does not have other new complains. She has mild elevated calcium. PHT was normal.  12/13/2019 :  The patient is here for follow up. She has been on letrozole daily and tolerating well. She had a bone density 12/2/109 which showed osteopenia of femur and hip. She is not taking calcium and vit D since her last calcium was elevated 10.7. She had a recent US breasts 12/2019 for breast pain which was negative. She was seen by her cardiologist and had blood work in the summer. She was told that she has severe anemia. She started her on iron pills. She denies any vaginal bleeding or rectal bleeding  6/12/2020: The patient is here for follow up. She had bilateral invasive lobular carcinoma of the breast, ER/KS positive and HER2/deana negative, status post bilateral mastectomy, bilateral sentinel lymph node biopsy, and right axillary lymph node dissection in 1/2013, status post adjuvant chemotherapy and adjuvant chest wall radiotherapy, currently on adjuvant endocrine therapy with letrozole and tolerating well. She had a bone density 12/2/19 which showed osteopenia of femur and hip. She is not taking calcium and vit D since her last calcium was elevated 10.7. She had a recent US breasts 12/2019 for breast pain which was negative. She does not have new complains.  12/02/2020: MANOJ is here for follow up visit for bilateral invasive lobular carcinoma of the breast, ER/KS positive and HER2/deana negative, status post bilateral mastectomy, bilateral sentinel lymph node biopsy, and right axillary lymph node dissection in 1/2013, status post adjuvant chemotherapy and adjuvant chest wall radiotherapy, currently on adjuvant endocrine therapy with letrozole and tolerating well besides mild arthralgia. She had a bone density 12/2/19 which showed osteopenia of femur and hip. She continues on Vitamin D daily. She denies any new breast symptoms at this time. In addition, patient was diagnosed with COVID in 3/2020.   06/02/2021: MANOJ is here for follow up visit for bilateral invasive lobular carcinoma of the breast, ER/KS positive and HER2/deana negative, status post bilateral mastectomy, bilateral sentinel lymph node biopsy, and right axillary lymph node dissection in 1/2013, status post adjuvant chemotherapy and adjuvant chest wall radiotherapy, currently on adjuvant endocrine therapy with letrozole since 7/2013 and tolerating well besides mild arthralgia. She had a bone density 12/2/19 which showed osteopenia of femur and hip. She continues on Vitamin D daily. She denies any new breast symptoms at this time. she had right breast skin tag biopsied by dermatologist; which was benign.   1/5/22 MANOJ is here for follow up visit for bilateral invasive lobular carcinoma of the breast, ER/KS positive and HER2/deana negative, status post bilateral mastectomy, bilateral sentinel lymph node biopsy, and right axillary lymph node dissection in 1/2013, status post adjuvant chemotherapy and adjuvant chest wall radiotherapy, currently on adjuvant endocrine therapy with letrozole since 7/2013 and tolerating well besides mild arthralgia. She had a bone density 12/7/21 which showed worsening osteopenia of femur and hip, T score -2.4. She is not complaint with Vitamin D, does not take calcium due to mildly elevated Calcium. She denies any new breast symptoms at this time. She was experiencing lower abdominal and pelvic pain in Sept had US which was unremarkable. In addition, had Thyroid US due to hypercalcemia which was unremarkable, In 8/2021 TSH 2.36, Calcium 10, and PTH was 78.   7/21/22: Manoj is here for follow up visit for bilateral invasive lobular carcinoma of the breast, ER/KS positive and HER2/deana negative, status post bilateral mastectomy, bilateral sentinel lymph node biopsy, and right axillary lymph node dissection in 1/2013, status post adjuvant chemotherapy and adjuvant chest wall radiotherapy, currently on adjuvant endocrine therapy with letrozole since 7/2013 and tolerating well besides mild arthralgia. She had a bone density 12/7/21 which showed worsening osteopenia of femur and hip, T score -2.4. She takes Vitamin D but not calcium because her calcium has been mildly elevated. She was found to have large hiatal hernia and she has had on and off diarrhea for long time. She has been seen by Dr. Borjas.   01/26/2023 Manoj is here for follow up visit for bilateral invasive lobular carcinoma of the breast, ER/KS positive and HER2/deana negative, status post bilateral mastectomy, bilateral sentinel lymph node biopsy, and right axillary lymph node dissection in 1/2013, status post adjuvant chemotherapy and adjuvant chest wall radiotherapy, currently on adjuvant endocrine therapy with letrozole since 7/2013 and tolerating well besides mild arthralgia. She had a bone density 12/7/21 which showed worsening osteopenia of femur and hip, T score -2.4. She takes Vitamin D but not calcium because her calcium has been mildly elevated. She was found to have large hiatal hernia and surgery on 11/21/22. Her Hb drooped to 9.5 g/dl after surgery. She was found to have high serum calcium but repeat calcium was normal. PTH was also within normal limit.  7/20/23 Manoj is here for follow up visit for bilateral invasive lobular carcinoma of the breast, ER/KS positive and HER2/deana negative, status post bilateral mastectomy, bilateral sentinel lymph node biopsy, and right axillary lymph node dissection in 1/2013, status post adjuvant chemotherapy and adjuvant chest wall radiotherapy, currently on adjuvant endocrine therapy with letrozole since 7/2013 and tolerating well besides mild arthralgia. She had a bone density 12/7/21 which showed worsening osteopenia of femur and hip, T score -2.4. She takes Vitamin D but not calcium because her calcium has been mildly elevated. Repeat serum calcium normal, MM panel normal. She was found to have large hiatal hernia and surgery on 11/21/22. Her Hb drooped to 9.5 g/dl after surgery, now normal. After hernia repair she was having symptoms consistent with vagal nerve mediated gastroparesis, she then had endoscopic pyloromyotomy, symptoms resolved.   1/25/2024: Manoj is here for follow up visit for bilateral invasive lobular carcinoma of the breast, ER/KS positive and HER2/deana negative, status post bilateral mastectomy, bilateral sentinel lymph node biopsy, and right axillary lymph node dissection in 1/2013, status post adjuvant chemotherapy and adjuvant chest wall radiotherapy, currently on adjuvant endocrine therapy with letrozole since 7/2013 and tolerating well besides mild arthralgia. She Completed 10 years treatment of Letrozole in 7/2023. She had a bone density 12/8/23 Showed:         ----------------------------------------------------------------- AP Spine (L1-L4)         0.998   -0.4      1.8     Normal Femoral Neck (Left) 0.552   -2.7     -0.7     Osteoporosis Total Hip (Left) 0.676   -2.2     -0.6     Osteopenia -which showed Osteoporosis of femur Neck and Osteopenia in the Hip. She takes Vitamin D.  After hernia repair, she was having symptoms consistent with vagal nerve mediated gastroparesis, she then had endoscopic pyloromyotomy, symptoms resolved.  She starts feeling better now.   3/29/24: Manoj is here for follow up visit. She has h/o bilateral invasive lobular carcinoma of the breast, ER/KS positive and HER2/deana negative, status post bilateral mastectomy, bilateral sentinel lymph node biopsy, and right axillary lymph node dissection in 1/2013, status post adjuvant chemotherapy and adjuvant chest wall radiotherapy, currently on adjuvant endocrine therapy with letrozole since 7/2013. She had CT AP in 2/2024 for abdominal pain which incidentally showed a lesion in T6. She had MRI spine on 3/11/24 which showed a 2.7 cm lesion within the left aspect of the T6 vertebral body and 7 mm marrow replacing lesion within the T11 vertebral body.  PET/CT on 3/28/24 showed Intense pathologic FDG uptake (SUV 9.3) coregistering with sclerotic lesion on the left side of T6 suspicious for biologic tumor activity. No other sites of abnormal FDG uptake. She is here today to discuss further management plan.  4/29/24: Manoj is here for follow up visit. She has h/o bilateral invasive lobular carcinoma of the breast, ER/KS positive and HER2/deana negative, status post bilateral mastectomy, bilateral sentinel lymph node biopsy, and right axillary lymph node dissection in 1/2013, status post adjuvant chemotherapy and adjuvant chest wall radiotherapy, Completed 10-year of endocrine therapy with Letrozole in 7/2023. She had CT AP in 2/2024 for abdominal pain which incidentally showed a lesion in T6. She had MRI spine on 3/11/24 which showed a 2.7 cm lesion within the left aspect of the T6 vertebral body and 7 mm marrow replacing lesion within the T11 vertebral body.  PET/CT on 3/28/24 showed Intense pathologic FDG uptake (SUV 9.3) coregistering with sclerotic lesion on the left side of T6 suspicious for biologic tumor activity. No other sites of abnormal FDG uptake. She had CT guided biopsy on 4/17/24 and is here today to discuss the result.     5/30/24: Manoj is here for follow up visit. She has h/o bilateral invasive lobular carcinoma of the breast, ER/KS positive and HER2/deana negative, status post bilateral mastectomy, bilateral sentinel lymph node biopsy, and right axillary lymph node dissection in 1/2013, status post adjuvant chemotherapy and adjuvant chest wall radiotherapy, Completed 10-year of endocrine therapy with Letrozole in 7/2023. She had CT AP in 2/2024 for abdominal pain which incidentally showed a lesion in T6. She had MRI spine on 3/11/24 which showed a 2.7 cm lesion within the left aspect of the T6 vertebral body and 7 mm marrow replacing lesion within the T11 vertebral body.  PET/CT on 3/28/24 showed Intense pathologic FDG uptake (SUV 9.3) coregistering with sclerotic lesion on the left side of T6 suspicious for biologic tumor activity.  She had CT guided biopsy on 4/17/24 which showed metastatic carcinoma consistent from breast primary. She saw Dr. Anaya and received palliative RT 2000cGy to T6 lesion and completed RT on 5/22/24.  6/27/24: Manoj is here for follow up visit. She has h/o bilateral invasive lobular carcinoma of the breast, ER/KS positive and HER2/deana negative, status post bilateral mastectomy, bilateral sentinel lymph node biopsy, and right axillary lymph node dissection in 1/2013, status post adjuvant chemotherapy and adjuvant chest wall radiotherapy, Completed 10-year of endocrine therapy with Letrozole in 7/2023. She had CT AP in 2/2024 for abdominal pain which incidentally showed a lesion in T6. She had MRI spine on 3/11/24 which showed a 2.7 cm lesion within the left aspect of the T6 vertebral body and 7 mm marrow replacing lesion within the T11 vertebral body.  PET/CT on 3/28/24 showed Intense pathologic FDG uptake (SUV 9.3) coregistering with sclerotic lesion on the left side of T6 suspicious for biologic tumor activity.  She had CT guided biopsy on 4/17/24 which showed metastatic carcinoma consistent from breast primary. She saw Dr. Anaya and received palliative RT 2000cGy to T6 lesion and completed RT on 5/22/24. She started on Xeloda and had 1st week treatment. She had loose stool but otherwise tolerated well.   08/01/24: bilateral sentinel lymph node biopsy, and right axillary lymph node dissection in 1/2013, status post adjuvant chemotherapy and adjuvant chest wall radiotherapy, Completed 10-year of endocrine therapy with Letrozole in 7/2023. She had CT AP in 2/2024 for abdominal pain which incidentally showed a lesion in T6. She had MRI spine on 3/11/24 which showed a 2.7 cm lesion within the left aspect of the T6 vertebral body and 7 mm marrow replacing lesion within the T11 vertebral body.  PET/CT on 3/28/24 showed Intense pathologic FDG uptake (SUV 9.3) coregistering with sclerotic lesion on the left side of T6 suspicious for biologic tumor activity.  She had CT guided biopsy on 4/17/24 which showed metastatic carcinoma consistent from breast primary. She saw Dr. Anaya and received palliative RT 2000cGy to T6 lesion and completed RT on 5/22/24. She started on Xeloda and had 1st week of June 2024. She is complaining of persistence diarrhea despite Imodium intake, currently on Xeloda 3tabs every 12 hrs for one week on and one week off.   8/29/24: Manoj is here for follow up visit. She has h/o bilateral invasive lobular carcinoma of the breast, ER/KS positive and HER2/deana negative, status post bilateral mastectomy, bilateral sentinel lymph node biopsy, and right axillary lymph node dissection in 1/2013, status post adjuvant chemotherapy and adjuvant chest wall radiotherapy, Completed 10-year of endocrine therapy with Letrozole in 7/2023. She had CT AP in 2/2024 for abdominal pain which incidentally showed a lesion in T6. She had MRI spine on 3/11/24 which showed a 2.7 cm lesion within the left aspect of the T6 vertebral body and 7 mm marrow replacing lesion within the T11 vertebral body.  PET/CT on 3/28/24 showed Intense pathologic FDG uptake (SUV 9.3) coregistering with sclerotic lesion on the left side of T6 suspicious for biologic tumor activity.  She had CT guided biopsy on 4/17/24 which showed metastatic carcinoma consistent from breast primary. She saw Dr. Anaya and received palliative RT 2000cGy to T6 lesion and completed RT on 5/22/24. She has been on Xeloda and did not tolerate well. She complains diarrhea, weight loss and eye irritation.   10/10/24 Manoj is here for follow up visit. She has h/o bilateral invasive lobular carcinoma of the breast, ER/KS positive and HER2/deana negative, status post bilateral mastectomy, bilateral sentinel lymph node biopsy, and right axillary lymph node dissection in 1/2013, status post adjuvant chemotherapy and adjuvant chest wall radiotherapy, Completed 10-year of endocrine therapy with Letrozole in 7/2023. She had CT AP in 2/2024 for abdominal pain which incidentally showed a lesion in T6. She had MRI spine on 3/11/24 which showed a 2.7 cm lesion within the left aspect of the T6 vertebral body and 7 mm marrow replacing lesion within the T11 vertebral body.  PET/CT on 3/28/24 showed Intense pathologic FDG uptake (SUV 9.3) coregistering with sclerotic lesion on the left side of T6 suspicious for biologic tumor activity.  She had CT guided biopsy on 4/17/24 which showed metastatic carcinoma consistent from breast primary. She saw Dr. Anaya and received palliative RT 2000cGy to T6 lesion and completed RT on 5/22/24. She took Xeloda for 3 months and did not tolerate well. Xeloda was discontinued. She had a PET scan in 9.24 which showed interval resolution of uptake in T6 and New uptake in L1 and new pleural effussion. She completed XRT 5 fractions in 9.24.  She is here for follow up.  11/11/24 Manoj is here for follow up visit. She has h/o bilateral invasive lobular carcinoma of the breast, ER/KS positive and HER2/deana negative, status post bilateral mastectomy, bilateral sentinel lymph node biopsy, and right axillary lymph node dissection in 1/2013, status post adjuvant chemotherapy and adjuvant chest wall radiotherapy, Completed 10-year of endocrine therapy with Letrozole in 7/2023. She developed recurrent disease with a lesion in T6 in 2/2024. CT guided biopsy on 4/17/24 showed metastatic carcinoma consistent from breast primary. She saw Dr. Anaya and received palliative RT 2000cGy to T6 lesion and completed RT on 5/22/24. She took Xeloda for 3 months and did not tolerate well. Xeloda was discontinued. She had repeat PET/CT in 9.24 which showed interval resolution of uptake in T6 but new uptake in L1 and new pleural effusion. She completed XRT 5 fractions. She complains low back pain.

## 2024-11-17 NOTE — ASSESSMENT
[FreeTextEntry1] : Assessment and Plan: # H/O Bilateral invasive lobular carcinoma of the breast, ER/AZ positive and HER2/deana negative, status post bilateral mastectomy, bilateral sentinel lymph node biopsy, and right axillary lymph node dissection, status post adjuvant chemotherapy and adjuvant chest wall radiotherapy, Completed 10-year of endocrine therapy with Letrozole in 7/2023.  - S/P b/l mastectomy. There is no palpable abnormality.  # Recurrent breast cancer(ER/AZ/Her 2 negative Biopsy proven) with T6 vertebral body lesion S/P XRT(5/24) now presenting with new L1 lesion S/P XRT  9.24 - MRI spine on 3/11/24 showed a 2.7 cm lesion within the left aspect of the T6 vertebral body and 7 mm marrow replacing lesion within the T11 vertebral body.  - PET/CT on 3/28/24 showed Intense pathologic FDG uptake (SUV 9.3) coregistering with sclerotic lesion on the left side of T6 suspicious for biologic tumor activity. No other sites of abnormal FDG uptake.  - CT guided core bx of the lesion in T6 vertebral body on 4/17/24 showed metastatic carcinoma, favor breast primary, ER/AZ negative. Her2 is negative.  - S/P palliative RT 2000cGy to T6 lesion and completed RT on 5/22/24. - She took Xeloda for 3 months, 1500 mg twice a day, one week on and one week off. Due to frequent diarrhea, will reduce Xeloda to 1000 mg q12h, one week on and one week off.  She developed multiple side effects and Xeloda was discontinued.  -- PET/CT for re-evaluation in 9/16/24 showed interval resolution of uptake in T6 and New uptake in L1 and new pleural effusion.  -- MRI of lumbar spine 9/25/24 showed infiltrative enhancing bone marrow signal abnormality within the T11 and L1 vertebral bodies most compatible with osseous metastatic disease. Mild compression deformity of L1, likely pathologic. -- She received XRT 5 fractions 2000cGy to Vertebrae, Lumbar. -- Discussed further management options. Recurrent breast cancer in the spine was triple negative. She had palliative RT x 2. Currently, there is no evidence of visceral disease or other site disease. She will continue  observation and have repeat imaging in 3 months. Since her initial breast cancer was hormone receptor was positive and bone met biopsy could have inadequate IHC staining for hormone receptor, she has been taking Letrozole while waiting for next imaging.   -- Right side pleural effusion is too small to tap now. Will monitor upon next imaging.  -- Order blood work: CBC, BMP, LFT, Mg, CEA and .  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 She has an appt to see neurosurgeon.   # H/O elevated serum calcium. MM panel showed no evidence of M-spike. Serum light chain ration is normal. Repeat serum calcium is normal and PTH is also normal.  # Osteopenia.  Encourage regular exercise. Her serum calcium level was mild elevated, 10.4 from latest blood work. Will monitor calcium today.  RTO for follow up in 4 weeks.

## 2024-12-16 NOTE — PHYSICAL EXAM
[de-identified] : nad [de-identified] : ncat [de-identified] : slow unlabored breathing [de-identified] : marger [de-identified] : s, nt, nd

## 2024-12-16 NOTE — HISTORY OF PRESENT ILLNESS
[de-identified] : 70 yo postop paraesophageal hernia with recurrent postop symptoms - EGD and then diagnostic laparoscopy was more consistent with gastoparesis rather than recurrence of hiatal hernia. Robotic diagnostic with lysis of adhesions done but limited due to gross distension of stomach. No hiatal assessment done, however EGD was more notable for significant retention of food. And with diarrhea symptoms, this suggests vagal nerve paresis or injury. She will need help to control symptoms while getting tested with nuclear emptying study for gastroparesis, and then possible pyloric botox.  Will see her GI next week. [de-identified] : She had a pyloromyotomy and had done for a while. Unfortunately she had a breast cancer recurrence in her spine resulting in fractures. She has been on chemo and radiation.  In the imaging there is a note of hiatal hernia. On review of the CT images, there is a tiny possible recurrence. She has no symptoms of dysphagia, heartburn or reflux.  We talked about her back pain and I will make a referral to neurosurgery and pain management in case there are useful stabilizing or palliative options.

## 2024-12-16 NOTE — ASSESSMENT
[FreeTextEntry1] : small possible radiologic recurrence of hiatal hernia without symptoms will follow for symptoms only  referral to Dr. Taylor for any possible spine interventions for pain relief or stability referral to Dr. Roca when outpatient office opens Jan.

## 2024-12-31 NOTE — PHYSICAL EXAM
[Fully active, able to carry on all pre-disease performance without restriction] : Status 0 - Fully active, able to carry on all pre-disease performance without restriction [Normal] : affect appropriate [de-identified] :  Status post bilateral mastectomy and autologous tissue reconstruction.  There is no skin lesion.  No palpable axillary lymphadenopathy. [de-identified] : Lower abdominal fat necrosis. Abdominal scar present.  [de-identified] : Low back pain 2' metastatic disease.

## 2024-12-31 NOTE — PHYSICAL EXAM
[Fully active, able to carry on all pre-disease performance without restriction] : Status 0 - Fully active, able to carry on all pre-disease performance without restriction [Normal] : affect appropriate [de-identified] :  Status post bilateral mastectomy and autologous tissue reconstruction.  There is no skin lesion.  No palpable axillary lymphadenopathy. [de-identified] : Lower abdominal fat necrosis. Abdominal scar present.  [de-identified] : Low back pain 2' metastatic disease.

## 2024-12-31 NOTE — ASSESSMENT
[FreeTextEntry1] : Assessment and Plan: # H/O Bilateral invasive lobular carcinoma of the breast, ER/AZ positive and HER2/deana negative, status post bilateral mastectomy, bilateral sentinel lymph node biopsy, and right axillary lymph node dissection, status post adjuvant chemotherapy and adjuvant chest wall radiotherapy, Completed 10-year of endocrine therapy with Letrozole in 7/2023.  - S/P b/l mastectomy. There is no palpable abnormality.  # Recurrent breast cancer(ER/AZ/Her 2 negative Biopsy proven) with T6 vertebral body lesion S/P XRT(5/24) now presenting with new L1 lesion S/P XRT  9.24 - MRI spine on 3/11/24 showed a 2.7 cm lesion within the left aspect of the T6 vertebral body and 7 mm marrow replacing lesion within the T11 vertebral body.  - PET/CT on 3/28/24 showed Intense pathologic FDG uptake (SUV 9.3) coregistering with sclerotic lesion on the left side of T6 suspicious for biologic tumor activity. No other sites of abnormal FDG uptake.  - CT guided core bx of the lesion in T6 vertebral body on 4/17/24 showed metastatic carcinoma, favor breast primary, ER/AZ negative. Her2 is negative.  - S/P palliative RT 2000cGy to T6 lesion and completed RT on 5/22/24. - She took Xeloda for 3 months, 1500 mg twice a day, one week on and one week off. Due to frequent diarrhea, dose reduced to Xeloda 1000 mg q12h, 1 week on and 1 week off.  She developed multiple side effects and Xeloda was discontinued.  -- PET/CT for re-evaluation in 9/16/24 showed interval resolution of uptake in T6 and New uptake in L1 and new pleural effusion.  -- MRI of lumbar spine 9/25/24 showed infiltrative enhancing bone marrow signal abnormality within the T11 and L1 vertebral bodies most compatible with osseous metastatic disease. Mild compression deformity of L1, likely pathologic. -- She received XRT 5 fractions 2000cGy to Vertebrae, Lumbar on 10/1/24 - 10/7/24. -- Discussed further management options. Recurrent breast cancer in the spine was triple negative. She had palliative RT x 2. Currently, there is no evidence of visceral disease or other site disease. She will continue  observation and have repeat imaging in 3 months due now, script given. Since her initial breast cancer was hormone receptor was positive and bone met biopsy could have inadequate IHC staining for hormone receptor, she has been taking Letrozole while waiting for next imaging.   -- Right side pleural effusion is too small to tap now. CT chest 12/12/24 showed no pleural effusion. Will monitor upon next imaging.  -- Order blood work: CBC, BMP, LFT, Mg, CEA and .  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 She has an appt to see neurosurgeon.  -- F/U with PCP, surgery, GI and gyne for health maintenance.  #Low back pain. NSAIDs as needed. F/U with Neurosurgery as scheduled.  # H/O elevated serum calcium. MM panel showed no evidence of M-spike. Serum light chain ration is normal. Repeat serum calcium is normal and PTH is also normal.  # Osteoporosis/osteopenia. Encourage regular exercise. She has history of elevated Calcium levels. Advised to take Vitamin 2000 units daily.  RTO for follow up in 4 weeks to see Dr Tovar.

## 2024-12-31 NOTE — ASSESSMENT
[FreeTextEntry1] : Assessment and Plan: # H/O Bilateral invasive lobular carcinoma of the breast, ER/CO positive and HER2/deana negative, status post bilateral mastectomy, bilateral sentinel lymph node biopsy, and right axillary lymph node dissection, status post adjuvant chemotherapy and adjuvant chest wall radiotherapy, Completed 10-year of endocrine therapy with Letrozole in 7/2023.  - S/P b/l mastectomy. There is no palpable abnormality.  # Recurrent breast cancer(ER/CO/Her 2 negative Biopsy proven) with T6 vertebral body lesion S/P XRT(5/24) now presenting with new L1 lesion S/P XRT  9.24 - MRI spine on 3/11/24 showed a 2.7 cm lesion within the left aspect of the T6 vertebral body and 7 mm marrow replacing lesion within the T11 vertebral body.  - PET/CT on 3/28/24 showed Intense pathologic FDG uptake (SUV 9.3) coregistering with sclerotic lesion on the left side of T6 suspicious for biologic tumor activity. No other sites of abnormal FDG uptake.  - CT guided core bx of the lesion in T6 vertebral body on 4/17/24 showed metastatic carcinoma, favor breast primary, ER/CO negative. Her2 is negative.  - S/P palliative RT 2000cGy to T6 lesion and completed RT on 5/22/24. - She took Xeloda for 3 months, 1500 mg twice a day, one week on and one week off. Due to frequent diarrhea, dose reduced to Xeloda 1000 mg q12h, 1 week on and 1 week off.  She developed multiple side effects and Xeloda was discontinued.  -- PET/CT for re-evaluation in 9/16/24 showed interval resolution of uptake in T6 and New uptake in L1 and new pleural effusion.  -- MRI of lumbar spine 9/25/24 showed infiltrative enhancing bone marrow signal abnormality within the T11 and L1 vertebral bodies most compatible with osseous metastatic disease. Mild compression deformity of L1, likely pathologic. -- She received XRT 5 fractions 2000cGy to Vertebrae, Lumbar on 10/1/24 - 10/7/24. -- Discussed further management options. Recurrent breast cancer in the spine was triple negative. She had palliative RT x 2. Currently, there is no evidence of visceral disease or other site disease. She will continue  observation and have repeat imaging in 3 months due now, script given. Since her initial breast cancer was hormone receptor was positive and bone met biopsy could have inadequate IHC staining for hormone receptor, she has been taking Letrozole while waiting for next imaging.   -- Right side pleural effusion is too small to tap now. CT chest 12/12/24 showed no pleural effusion. Will monitor upon next imaging.  -- Order blood work: CBC, BMP, LFT, Mg, CEA and .  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 She has an appt to see neurosurgeon.  -- F/U with PCP, surgery, GI and gyne for health maintenance.  #Low back pain. NSAIDs as needed. F/U with Neurosurgery as scheduled.  # H/O elevated serum calcium. MM panel showed no evidence of M-spike. Serum light chain ration is normal. Repeat serum calcium is normal and PTH is also normal.  # Osteoporosis/osteopenia. Encourage regular exercise. She has history of elevated Calcium levels. Advised to take Vitamin 2000 units daily.  RTO for follow up in 4 weeks to see Dr Tovar.

## 2024-12-31 NOTE — HISTORY OF PRESENT ILLNESS
[de-identified] : This 68-year-old female is here today for a followup visit.  She has a history of bilateral invasive lobular carcinoma, stage IIIA (pT1c N2a M0) in the right breast, and stage IA (pT1b N0 M0) in the left breast.  Both sided breast cancer were ER/KY positive and HER2/deana negative.  She had bilateral mastectomy, bilateral sentinel lymph node biopsy, and right axillary lymph node dissection at [facility] on 01/08/2013.  She received adjuvant chemotherapy with Adriamycin and Cytoxan followed by paclitaxel.  She also received post mastectomy adjuvant radiotherapy to right side chest wall.  She has been on adjuvant endocrine therapy with Femara since 7/2013  and has been tolerating well.  She had genetic test for BRCA mutations.  She is negative for BRCA1/2 mutations.  In 10/2014, she had a bone density at [facility], which showed osteopenia. Her latest bone density was done 7/21/16 which showed osteopenia. MRI of breasts was done on 1/30/17 which showed no MR evidence of malignancy in either breast. Recommendation: Unless otherwise indicated by clinical findings, annual screening mammography recommended. This can be alternated at 6 month intervals with bilateral screening breast MRI.\par   She has been taking calcium and vitamin D supplements. Latest colonoscopy 11/2016 pt states it was negative. Saw Dr. Garcia 1/2017.\par  She saw Dr. Rankin recently. She is scheduled to have a revision of her lower abdominal fat necrosis on 1/2018.\par  \par   [de-identified] : 6/8/18: She stopped letrozole few months ago as she was found to be in Afib by her cardiologist on Pre-op clearance for abdominal fat necrosis. However her cardiologist () told her that you can resume Letrozole. She had surgery for abdominal fat necrosis and she is going for surgery for incisional hernia by .   12/13/18: The patient is here for followup visit. She has been taking Letrozole daily (since 7/2013) and tolerating well. She had hernia repair surgery. She complains some pain in her knees. She had MRI breast in 1 2017. There was no suspicious finding.  6/14/19; The patient is here for followup visit. She has been taking Letrozole daily (since 7/2013) and tolerating well. She complains some pain in her knees. She does not have other new complains. She has mild elevated calcium. PHT was normal.  12/13/2019 :  The patient is here for follow up. She has been on letrozole daily and tolerating well. She had a bone density 12/2/109 which showed osteopenia of femur and hip. She is not taking calcium and vit D since her last calcium was elevated 10.7. She had a recent US breasts 12/2019 for breast pain which was negative. She was seen by her cardiologist and had blood work in the summer. She was told that she has severe anemia. She started her on iron pills. She denies any vaginal bleeding or rectal bleeding  6/12/2020: The patient is here for follow up. She had bilateral invasive lobular carcinoma of the breast, ER/VA positive and HER2/deana negative, status post bilateral mastectomy, bilateral sentinel lymph node biopsy, and right axillary lymph node dissection in 1/2013, status post adjuvant chemotherapy and adjuvant chest wall radiotherapy, currently on adjuvant endocrine therapy with letrozole and tolerating well. She had a bone density 12/2/19 which showed osteopenia of femur and hip. She is not taking calcium and vit D since her last calcium was elevated 10.7. She had a recent US breasts 12/2019 for breast pain which was negative. She does not have new complains.  12/02/2020: MANOJ is here for follow up visit for bilateral invasive lobular carcinoma of the breast, ER/VA positive and HER2/deana negative, status post bilateral mastectomy, bilateral sentinel lymph node biopsy, and right axillary lymph node dissection in 1/2013, status post adjuvant chemotherapy and adjuvant chest wall radiotherapy, currently on adjuvant endocrine therapy with letrozole and tolerating well besides mild arthralgia. She had a bone density 12/2/19 which showed osteopenia of femur and hip. She continues on Vitamin D daily. She denies any new breast symptoms at this time. In addition, patient was diagnosed with COVID in 3/2020.   06/02/2021: MANOJ is here for follow up visit for bilateral invasive lobular carcinoma of the breast, ER/VA positive and HER2/deana negative, status post bilateral mastectomy, bilateral sentinel lymph node biopsy, and right axillary lymph node dissection in 1/2013, status post adjuvant chemotherapy and adjuvant chest wall radiotherapy, currently on adjuvant endocrine therapy with letrozole since 7/2013 and tolerating well besides mild arthralgia. She had a bone density 12/2/19 which showed osteopenia of femur and hip. She continues on Vitamin D daily. She denies any new breast symptoms at this time. she had right breast skin tag biopsied by dermatologist; which was benign.   1/5/22 MANOJ is here for follow up visit for bilateral invasive lobular carcinoma of the breast, ER/VA positive and HER2/deana negative, status post bilateral mastectomy, bilateral sentinel lymph node biopsy, and right axillary lymph node dissection in 1/2013, status post adjuvant chemotherapy and adjuvant chest wall radiotherapy, currently on adjuvant endocrine therapy with letrozole since 7/2013 and tolerating well besides mild arthralgia. She had a bone density 12/7/21 which showed worsening osteopenia of femur and hip, T score -2.4. She is not complaint with Vitamin D, does not take calcium due to mildly elevated Calcium. She denies any new breast symptoms at this time. She was experiencing lower abdominal and pelvic pain in Sept had US which was unremarkable. In addition, had Thyroid US due to hypercalcemia which was unremarkable, In 8/2021 TSH 2.36, Calcium 10, and PTH was 78.   7/21/22: Manoj is here for follow up visit for bilateral invasive lobular carcinoma of the breast, ER/VA positive and HER2/deana negative, status post bilateral mastectomy, bilateral sentinel lymph node biopsy, and right axillary lymph node dissection in 1/2013, status post adjuvant chemotherapy and adjuvant chest wall radiotherapy, currently on adjuvant endocrine therapy with letrozole since 7/2013 and tolerating well besides mild arthralgia. She had a bone density 12/7/21 which showed worsening osteopenia of femur and hip, T score -2.4. She takes Vitamin D but not calcium because her calcium has been mildly elevated. She was found to have large hiatal hernia and she has had on and off diarrhea for long time. She has been seen by Dr. Borjas.   01/26/2023 Manoj is here for follow up visit for bilateral invasive lobular carcinoma of the breast, ER/VA positive and HER2/deana negative, status post bilateral mastectomy, bilateral sentinel lymph node biopsy, and right axillary lymph node dissection in 1/2013, status post adjuvant chemotherapy and adjuvant chest wall radiotherapy, currently on adjuvant endocrine therapy with letrozole since 7/2013 and tolerating well besides mild arthralgia. She had a bone density 12/7/21 which showed worsening osteopenia of femur and hip, T score -2.4. She takes Vitamin D but not calcium because her calcium has been mildly elevated. She was found to have large hiatal hernia and surgery on 11/21/22. Her Hb drooped to 9.5 g/dl after surgery. She was found to have high serum calcium but repeat calcium was normal. PTH was also within normal limit.  7/20/23 Manoj is here for follow up visit for bilateral invasive lobular carcinoma of the breast, ER/VA positive and HER2/deana negative, status post bilateral mastectomy, bilateral sentinel lymph node biopsy, and right axillary lymph node dissection in 1/2013, status post adjuvant chemotherapy and adjuvant chest wall radiotherapy, currently on adjuvant endocrine therapy with letrozole since 7/2013 and tolerating well besides mild arthralgia. She had a bone density 12/7/21 which showed worsening osteopenia of femur and hip, T score -2.4. She takes Vitamin D but not calcium because her calcium has been mildly elevated. Repeat serum calcium normal, MM panel normal. She was found to have large hiatal hernia and surgery on 11/21/22. Her Hb drooped to 9.5 g/dl after surgery, now normal. After hernia repair she was having symptoms consistent with vagal nerve mediated gastroparesis, she then had endoscopic pyloromyotomy, symptoms resolved.   1/25/2024: Manoj is here for follow up visit for bilateral invasive lobular carcinoma of the breast, ER/VA positive and HER2/deana negative, status post bilateral mastectomy, bilateral sentinel lymph node biopsy, and right axillary lymph node dissection in 1/2013, status post adjuvant chemotherapy and adjuvant chest wall radiotherapy, currently on adjuvant endocrine therapy with letrozole since 7/2013 and tolerating well besides mild arthralgia. She Completed 10 years treatment of Letrozole in 7/2023. She had a bone density 12/8/23 Showed:         ----------------------------------------------------------------- AP Spine (L1-L4)         0.998   -0.4      1.8     Normal Femoral Neck (Left) 0.552   -2.7     -0.7     Osteoporosis Total Hip (Left) 0.676   -2.2     -0.6     Osteopenia -which showed Osteoporosis of femur Neck and Osteopenia in the Hip. She takes Vitamin D.  After hernia repair, she was having symptoms consistent with vagal nerve mediated gastroparesis, she then had endoscopic pyloromyotomy, symptoms resolved.  She starts feeling better now.   3/29/24: Manoj is here for follow up visit. She has h/o bilateral invasive lobular carcinoma of the breast, ER/VA positive and HER2/deana negative, status post bilateral mastectomy, bilateral sentinel lymph node biopsy, and right axillary lymph node dissection in 1/2013, status post adjuvant chemotherapy and adjuvant chest wall radiotherapy, currently on adjuvant endocrine therapy with letrozole since 7/2013. She had CT AP in 2/2024 for abdominal pain which incidentally showed a lesion in T6. She had MRI spine on 3/11/24 which showed a 2.7 cm lesion within the left aspect of the T6 vertebral body and 7 mm marrow replacing lesion within the T11 vertebral body.  PET/CT on 3/28/24 showed Intense pathologic FDG uptake (SUV 9.3) coregistering with sclerotic lesion on the left side of T6 suspicious for biologic tumor activity. No other sites of abnormal FDG uptake. She is here today to discuss further management plan.  4/29/24: Manoj is here for follow up visit. She has h/o bilateral invasive lobular carcinoma of the breast, ER/VA positive and HER2/deana negative, status post bilateral mastectomy, bilateral sentinel lymph node biopsy, and right axillary lymph node dissection in 1/2013, status post adjuvant chemotherapy and adjuvant chest wall radiotherapy, Completed 10-year of endocrine therapy with Letrozole in 7/2023. She had CT AP in 2/2024 for abdominal pain which incidentally showed a lesion in T6. She had MRI spine on 3/11/24 which showed a 2.7 cm lesion within the left aspect of the T6 vertebral body and 7 mm marrow replacing lesion within the T11 vertebral body.  PET/CT on 3/28/24 showed Intense pathologic FDG uptake (SUV 9.3) coregistering with sclerotic lesion on the left side of T6 suspicious for biologic tumor activity. No other sites of abnormal FDG uptake. She had CT guided biopsy on 4/17/24 and is here today to discuss the result.     5/30/24: Manoj is here for follow up visit. She has h/o bilateral invasive lobular carcinoma of the breast, ER/VA positive and HER2/deana negative, status post bilateral mastectomy, bilateral sentinel lymph node biopsy, and right axillary lymph node dissection in 1/2013, status post adjuvant chemotherapy and adjuvant chest wall radiotherapy, Completed 10-year of endocrine therapy with Letrozole in 7/2023. She had CT AP in 2/2024 for abdominal pain which incidentally showed a lesion in T6. She had MRI spine on 3/11/24 which showed a 2.7 cm lesion within the left aspect of the T6 vertebral body and 7 mm marrow replacing lesion within the T11 vertebral body.  PET/CT on 3/28/24 showed Intense pathologic FDG uptake (SUV 9.3) coregistering with sclerotic lesion on the left side of T6 suspicious for biologic tumor activity.  She had CT guided biopsy on 4/17/24 which showed metastatic carcinoma consistent from breast primary. She saw Dr. Anaya and received palliative RT 2000cGy to T6 lesion and completed RT on 5/22/24.  6/27/24: Manoj is here for follow up visit. She has h/o bilateral invasive lobular carcinoma of the breast, ER/VA positive and HER2/deana negative, status post bilateral mastectomy, bilateral sentinel lymph node biopsy, and right axillary lymph node dissection in 1/2013, status post adjuvant chemotherapy and adjuvant chest wall radiotherapy, Completed 10-year of endocrine therapy with Letrozole in 7/2023. She had CT AP in 2/2024 for abdominal pain which incidentally showed a lesion in T6. She had MRI spine on 3/11/24 which showed a 2.7 cm lesion within the left aspect of the T6 vertebral body and 7 mm marrow replacing lesion within the T11 vertebral body.  PET/CT on 3/28/24 showed Intense pathologic FDG uptake (SUV 9.3) coregistering with sclerotic lesion on the left side of T6 suspicious for biologic tumor activity.  She had CT guided biopsy on 4/17/24 which showed metastatic carcinoma consistent from breast primary. She saw Dr. Anaya and received palliative RT 2000cGy to T6 lesion and completed RT on 5/22/24. She started on Xeloda and had 1st week treatment. She had loose stool but otherwise tolerated well.   08/01/24: bilateral sentinel lymph node biopsy, and right axillary lymph node dissection in 1/2013, status post adjuvant chemotherapy and adjuvant chest wall radiotherapy, Completed 10-year of endocrine therapy with Letrozole in 7/2023. She had CT AP in 2/2024 for abdominal pain which incidentally showed a lesion in T6. She had MRI spine on 3/11/24 which showed a 2.7 cm lesion within the left aspect of the T6 vertebral body and 7 mm marrow replacing lesion within the T11 vertebral body.  PET/CT on 3/28/24 showed Intense pathologic FDG uptake (SUV 9.3) coregistering with sclerotic lesion on the left side of T6 suspicious for biologic tumor activity.  She had CT guided biopsy on 4/17/24 which showed metastatic carcinoma consistent from breast primary. She saw Dr. Anaya and received palliative RT 2000cGy to T6 lesion and completed RT on 5/22/24. She started on Xeloda and had 1st week of June 2024. She is complaining of persistence diarrhea despite Imodium intake, currently on Xeloda 3tabs every 12 hrs for one week on and one week off.   8/29/24: Manoj is here for follow up visit. She has h/o bilateral invasive lobular carcinoma of the breast, ER/VA positive and HER2/deana negative, status post bilateral mastectomy, bilateral sentinel lymph node biopsy, and right axillary lymph node dissection in 1/2013, status post adjuvant chemotherapy and adjuvant chest wall radiotherapy, Completed 10-year of endocrine therapy with Letrozole in 7/2023. She had CT AP in 2/2024 for abdominal pain which incidentally showed a lesion in T6. She had MRI spine on 3/11/24 which showed a 2.7 cm lesion within the left aspect of the T6 vertebral body and 7 mm marrow replacing lesion within the T11 vertebral body.  PET/CT on 3/28/24 showed Intense pathologic FDG uptake (SUV 9.3) coregistering with sclerotic lesion on the left side of T6 suspicious for biologic tumor activity.  She had CT guided biopsy on 4/17/24 which showed metastatic carcinoma consistent from breast primary. She saw Dr. Anaya and received palliative RT 2000cGy to T6 lesion and completed RT on 5/22/24. She has been on Xeloda and did not tolerate well. She complains diarrhea, weight loss and eye irritation.   10/10/24 Manoj is here for follow up visit. She has h/o bilateral invasive lobular carcinoma of the breast, ER/VA positive and HER2/deana negative, status post bilateral mastectomy, bilateral sentinel lymph node biopsy, and right axillary lymph node dissection in 1/2013, status post adjuvant chemotherapy and adjuvant chest wall radiotherapy, Completed 10-year of endocrine therapy with Letrozole in 7/2023. She had CT AP in 2/2024 for abdominal pain which incidentally showed a lesion in T6. She had MRI spine on 3/11/24 which showed a 2.7 cm lesion within the left aspect of the T6 vertebral body and 7 mm marrow replacing lesion within the T11 vertebral body.  PET/CT on 3/28/24 showed Intense pathologic FDG uptake (SUV 9.3) coregistering with sclerotic lesion on the left side of T6 suspicious for biologic tumor activity.  She had CT guided biopsy on 4/17/24 which showed metastatic carcinoma consistent from breast primary. She saw Dr. Anaya and received palliative RT 2000cGy to T6 lesion and completed RT on 5/22/24. She took Xeloda for 3 months and did not tolerate well. Xeloda was discontinued. She had a PET scan in 9.24 which showed interval resolution of uptake in T6 and New uptake in L1 and new pleural effussion. She completed XRT 5 fractions in 9.24.  She is here for follow up.  11/11/24 Manoj is here for follow up visit. She has h/o bilateral invasive lobular carcinoma of the breast, ER/VA positive and HER2/deana negative, status post bilateral mastectomy, bilateral sentinel lymph node biopsy, and right axillary lymph node dissection in 1/2013, status post adjuvant chemotherapy and adjuvant chest wall radiotherapy, Completed 10-year of endocrine therapy with Letrozole in 7/2023. She developed recurrent disease with a lesion in T6 in 2/2024. CT guided biopsy on 4/17/24 showed metastatic carcinoma consistent from breast primary. She saw Dr. Anaya and received palliative RT 2000cGy to T6 lesion and completed RT on 5/22/24. She took Xeloda for 3 months and did not tolerate well. Xeloda was discontinued. She had repeat PET/CT in 9.24 which showed interval resolution of uptake in T6 but new uptake in L1 and new pleural effusion. She completed XRT 5 fractions. She complains low back pain.   12/30/24 Manoj is here for follow up visit. Her spouse is with her. She has h/o bilateral invasive lobular carcinoma of the breast, ER/VA positive and HER2/deana negative, s/p bilateral mastectomy, bilateral sentinel lymph node biopsy, and right axillary lymph node dissection in 1/2013, s/p adjuvant chemotherapy and adjuvant chest wall radiotherapy, Completed 10-year of endocrine therapy with Letrozole in 7/2023. She developed recurrent disease with a lesion in T6 in 2/2024. CT guided biopsy on 4/17/24 showed metastatic carcinoma consistent from breast primary. She saw Dr. Anaya and received palliative RT 2000cGy to T6 lesion and completed RT on 5/22/24. She took Xeloda for 3 months and did not tolerate well. Xeloda was discontinued. She had repeat PET/CT in 9/16/24 which showed interval resolution of uptake in T6 but new uptake in L1 and new pleural effusion. MR Lumbar spine on 9/25/24 showed Infiltrative enhancing bone marrow signal abnormality within the T11 and L1 vertebral bodies most compatible with osseous metastatic disease. Mild compression deformity of L1, likely pathologic. Multilevel degenerative changes as described. She completed XRT 5 fractions on 10/1/24 -10/7/24. She has a history of fall on 9/2024. CT chest 12/12/24 showed no pleural effusion. She complains low back pain, taking Aleeve as needed with relief.

## 2024-12-31 NOTE — REVIEW OF SYSTEMS
[Joint Pain] : joint pain [Muscle Pain] : muscle pain [Negative] : Allergic/Immunologic [[email or web address]] : Low back pain, managed with Aleeve.

## 2024-12-31 NOTE — HISTORY OF PRESENT ILLNESS
[de-identified] : This 68-year-old female is here today for a followup visit.  She has a history of bilateral invasive lobular carcinoma, stage IIIA (pT1c N2a M0) in the right breast, and stage IA (pT1b N0 M0) in the left breast.  Both sided breast cancer were ER/PA positive and HER2/deana negative.  She had bilateral mastectomy, bilateral sentinel lymph node biopsy, and right axillary lymph node dissection at [facility] on 01/08/2013.  She received adjuvant chemotherapy with Adriamycin and Cytoxan followed by paclitaxel.  She also received post mastectomy adjuvant radiotherapy to right side chest wall.  She has been on adjuvant endocrine therapy with Femara since 7/2013  and has been tolerating well.  She had genetic test for BRCA mutations.  She is negative for BRCA1/2 mutations.  In 10/2014, she had a bone density at [facility], which showed osteopenia. Her latest bone density was done 7/21/16 which showed osteopenia. MRI of breasts was done on 1/30/17 which showed no MR evidence of malignancy in either breast. Recommendation: Unless otherwise indicated by clinical findings, annual screening mammography recommended. This can be alternated at 6 month intervals with bilateral screening breast MRI.\par   She has been taking calcium and vitamin D supplements. Latest colonoscopy 11/2016 pt states it was negative. Saw Dr. Garcia 1/2017.\par  She saw Dr. Rankin recently. She is scheduled to have a revision of her lower abdominal fat necrosis on 1/2018.\par  \par   [de-identified] : 6/8/18: She stopped letrozole few months ago as she was found to be in Afib by her cardiologist on Pre-op clearance for abdominal fat necrosis. However her cardiologist () told her that you can resume Letrozole. She had surgery for abdominal fat necrosis and she is going for surgery for incisional hernia by .   12/13/18: The patient is here for followup visit. She has been taking Letrozole daily (since 7/2013) and tolerating well. She had hernia repair surgery. She complains some pain in her knees. She had MRI breast in 1 2017. There was no suspicious finding.  6/14/19; The patient is here for followup visit. She has been taking Letrozole daily (since 7/2013) and tolerating well. She complains some pain in her knees. She does not have other new complains. She has mild elevated calcium. PHT was normal.  12/13/2019 :  The patient is here for follow up. She has been on letrozole daily and tolerating well. She had a bone density 12/2/109 which showed osteopenia of femur and hip. She is not taking calcium and vit D since her last calcium was elevated 10.7. She had a recent US breasts 12/2019 for breast pain which was negative. She was seen by her cardiologist and had blood work in the summer. She was told that she has severe anemia. She started her on iron pills. She denies any vaginal bleeding or rectal bleeding  6/12/2020: The patient is here for follow up. She had bilateral invasive lobular carcinoma of the breast, ER/PA positive and HER2/deana negative, status post bilateral mastectomy, bilateral sentinel lymph node biopsy, and right axillary lymph node dissection in 1/2013, status post adjuvant chemotherapy and adjuvant chest wall radiotherapy, currently on adjuvant endocrine therapy with letrozole and tolerating well. She had a bone density 12/2/19 which showed osteopenia of femur and hip. She is not taking calcium and vit D since her last calcium was elevated 10.7. She had a recent US breasts 12/2019 for breast pain which was negative. She does not have new complains.  12/02/2020: MANOJ is here for follow up visit for bilateral invasive lobular carcinoma of the breast, ER/PA positive and HER2/deana negative, status post bilateral mastectomy, bilateral sentinel lymph node biopsy, and right axillary lymph node dissection in 1/2013, status post adjuvant chemotherapy and adjuvant chest wall radiotherapy, currently on adjuvant endocrine therapy with letrozole and tolerating well besides mild arthralgia. She had a bone density 12/2/19 which showed osteopenia of femur and hip. She continues on Vitamin D daily. She denies any new breast symptoms at this time. In addition, patient was diagnosed with COVID in 3/2020.   06/02/2021: MANOJ is here for follow up visit for bilateral invasive lobular carcinoma of the breast, ER/PA positive and HER2/deana negative, status post bilateral mastectomy, bilateral sentinel lymph node biopsy, and right axillary lymph node dissection in 1/2013, status post adjuvant chemotherapy and adjuvant chest wall radiotherapy, currently on adjuvant endocrine therapy with letrozole since 7/2013 and tolerating well besides mild arthralgia. She had a bone density 12/2/19 which showed osteopenia of femur and hip. She continues on Vitamin D daily. She denies any new breast symptoms at this time. she had right breast skin tag biopsied by dermatologist; which was benign.   1/5/22 MANOJ is here for follow up visit for bilateral invasive lobular carcinoma of the breast, ER/PA positive and HER2/deana negative, status post bilateral mastectomy, bilateral sentinel lymph node biopsy, and right axillary lymph node dissection in 1/2013, status post adjuvant chemotherapy and adjuvant chest wall radiotherapy, currently on adjuvant endocrine therapy with letrozole since 7/2013 and tolerating well besides mild arthralgia. She had a bone density 12/7/21 which showed worsening osteopenia of femur and hip, T score -2.4. She is not complaint with Vitamin D, does not take calcium due to mildly elevated Calcium. She denies any new breast symptoms at this time. She was experiencing lower abdominal and pelvic pain in Sept had US which was unremarkable. In addition, had Thyroid US due to hypercalcemia which was unremarkable, In 8/2021 TSH 2.36, Calcium 10, and PTH was 78.   7/21/22: Manoj is here for follow up visit for bilateral invasive lobular carcinoma of the breast, ER/PA positive and HER2/deana negative, status post bilateral mastectomy, bilateral sentinel lymph node biopsy, and right axillary lymph node dissection in 1/2013, status post adjuvant chemotherapy and adjuvant chest wall radiotherapy, currently on adjuvant endocrine therapy with letrozole since 7/2013 and tolerating well besides mild arthralgia. She had a bone density 12/7/21 which showed worsening osteopenia of femur and hip, T score -2.4. She takes Vitamin D but not calcium because her calcium has been mildly elevated. She was found to have large hiatal hernia and she has had on and off diarrhea for long time. She has been seen by Dr. Borjas.   01/26/2023 Manoj is here for follow up visit for bilateral invasive lobular carcinoma of the breast, ER/PA positive and HER2/deana negative, status post bilateral mastectomy, bilateral sentinel lymph node biopsy, and right axillary lymph node dissection in 1/2013, status post adjuvant chemotherapy and adjuvant chest wall radiotherapy, currently on adjuvant endocrine therapy with letrozole since 7/2013 and tolerating well besides mild arthralgia. She had a bone density 12/7/21 which showed worsening osteopenia of femur and hip, T score -2.4. She takes Vitamin D but not calcium because her calcium has been mildly elevated. She was found to have large hiatal hernia and surgery on 11/21/22. Her Hb drooped to 9.5 g/dl after surgery. She was found to have high serum calcium but repeat calcium was normal. PTH was also within normal limit.  7/20/23 Manoj is here for follow up visit for bilateral invasive lobular carcinoma of the breast, ER/PA positive and HER2/deana negative, status post bilateral mastectomy, bilateral sentinel lymph node biopsy, and right axillary lymph node dissection in 1/2013, status post adjuvant chemotherapy and adjuvant chest wall radiotherapy, currently on adjuvant endocrine therapy with letrozole since 7/2013 and tolerating well besides mild arthralgia. She had a bone density 12/7/21 which showed worsening osteopenia of femur and hip, T score -2.4. She takes Vitamin D but not calcium because her calcium has been mildly elevated. Repeat serum calcium normal, MM panel normal. She was found to have large hiatal hernia and surgery on 11/21/22. Her Hb drooped to 9.5 g/dl after surgery, now normal. After hernia repair she was having symptoms consistent with vagal nerve mediated gastroparesis, she then had endoscopic pyloromyotomy, symptoms resolved.   1/25/2024: Manoj is here for follow up visit for bilateral invasive lobular carcinoma of the breast, ER/PA positive and HER2/deana negative, status post bilateral mastectomy, bilateral sentinel lymph node biopsy, and right axillary lymph node dissection in 1/2013, status post adjuvant chemotherapy and adjuvant chest wall radiotherapy, currently on adjuvant endocrine therapy with letrozole since 7/2013 and tolerating well besides mild arthralgia. She Completed 10 years treatment of Letrozole in 7/2023. She had a bone density 12/8/23 Showed:         ----------------------------------------------------------------- AP Spine (L1-L4)         0.998   -0.4      1.8     Normal Femoral Neck (Left) 0.552   -2.7     -0.7     Osteoporosis Total Hip (Left) 0.676   -2.2     -0.6     Osteopenia -which showed Osteoporosis of femur Neck and Osteopenia in the Hip. She takes Vitamin D.  After hernia repair, she was having symptoms consistent with vagal nerve mediated gastroparesis, she then had endoscopic pyloromyotomy, symptoms resolved.  She starts feeling better now.   3/29/24: Manoj is here for follow up visit. She has h/o bilateral invasive lobular carcinoma of the breast, ER/PA positive and HER2/deana negative, status post bilateral mastectomy, bilateral sentinel lymph node biopsy, and right axillary lymph node dissection in 1/2013, status post adjuvant chemotherapy and adjuvant chest wall radiotherapy, currently on adjuvant endocrine therapy with letrozole since 7/2013. She had CT AP in 2/2024 for abdominal pain which incidentally showed a lesion in T6. She had MRI spine on 3/11/24 which showed a 2.7 cm lesion within the left aspect of the T6 vertebral body and 7 mm marrow replacing lesion within the T11 vertebral body.  PET/CT on 3/28/24 showed Intense pathologic FDG uptake (SUV 9.3) coregistering with sclerotic lesion on the left side of T6 suspicious for biologic tumor activity. No other sites of abnormal FDG uptake. She is here today to discuss further management plan.  4/29/24: Manoj is here for follow up visit. She has h/o bilateral invasive lobular carcinoma of the breast, ER/PA positive and HER2/deana negative, status post bilateral mastectomy, bilateral sentinel lymph node biopsy, and right axillary lymph node dissection in 1/2013, status post adjuvant chemotherapy and adjuvant chest wall radiotherapy, Completed 10-year of endocrine therapy with Letrozole in 7/2023. She had CT AP in 2/2024 for abdominal pain which incidentally showed a lesion in T6. She had MRI spine on 3/11/24 which showed a 2.7 cm lesion within the left aspect of the T6 vertebral body and 7 mm marrow replacing lesion within the T11 vertebral body.  PET/CT on 3/28/24 showed Intense pathologic FDG uptake (SUV 9.3) coregistering with sclerotic lesion on the left side of T6 suspicious for biologic tumor activity. No other sites of abnormal FDG uptake. She had CT guided biopsy on 4/17/24 and is here today to discuss the result.     5/30/24: Manoj is here for follow up visit. She has h/o bilateral invasive lobular carcinoma of the breast, ER/PA positive and HER2/deana negative, status post bilateral mastectomy, bilateral sentinel lymph node biopsy, and right axillary lymph node dissection in 1/2013, status post adjuvant chemotherapy and adjuvant chest wall radiotherapy, Completed 10-year of endocrine therapy with Letrozole in 7/2023. She had CT AP in 2/2024 for abdominal pain which incidentally showed a lesion in T6. She had MRI spine on 3/11/24 which showed a 2.7 cm lesion within the left aspect of the T6 vertebral body and 7 mm marrow replacing lesion within the T11 vertebral body.  PET/CT on 3/28/24 showed Intense pathologic FDG uptake (SUV 9.3) coregistering with sclerotic lesion on the left side of T6 suspicious for biologic tumor activity.  She had CT guided biopsy on 4/17/24 which showed metastatic carcinoma consistent from breast primary. She saw Dr. Anaya and received palliative RT 2000cGy to T6 lesion and completed RT on 5/22/24.  6/27/24: Manoj is here for follow up visit. She has h/o bilateral invasive lobular carcinoma of the breast, ER/PA positive and HER2/deana negative, status post bilateral mastectomy, bilateral sentinel lymph node biopsy, and right axillary lymph node dissection in 1/2013, status post adjuvant chemotherapy and adjuvant chest wall radiotherapy, Completed 10-year of endocrine therapy with Letrozole in 7/2023. She had CT AP in 2/2024 for abdominal pain which incidentally showed a lesion in T6. She had MRI spine on 3/11/24 which showed a 2.7 cm lesion within the left aspect of the T6 vertebral body and 7 mm marrow replacing lesion within the T11 vertebral body.  PET/CT on 3/28/24 showed Intense pathologic FDG uptake (SUV 9.3) coregistering with sclerotic lesion on the left side of T6 suspicious for biologic tumor activity.  She had CT guided biopsy on 4/17/24 which showed metastatic carcinoma consistent from breast primary. She saw Dr. Anaya and received palliative RT 2000cGy to T6 lesion and completed RT on 5/22/24. She started on Xeloda and had 1st week treatment. She had loose stool but otherwise tolerated well.   08/01/24: bilateral sentinel lymph node biopsy, and right axillary lymph node dissection in 1/2013, status post adjuvant chemotherapy and adjuvant chest wall radiotherapy, Completed 10-year of endocrine therapy with Letrozole in 7/2023. She had CT AP in 2/2024 for abdominal pain which incidentally showed a lesion in T6. She had MRI spine on 3/11/24 which showed a 2.7 cm lesion within the left aspect of the T6 vertebral body and 7 mm marrow replacing lesion within the T11 vertebral body.  PET/CT on 3/28/24 showed Intense pathologic FDG uptake (SUV 9.3) coregistering with sclerotic lesion on the left side of T6 suspicious for biologic tumor activity.  She had CT guided biopsy on 4/17/24 which showed metastatic carcinoma consistent from breast primary. She saw Dr. Anaya and received palliative RT 2000cGy to T6 lesion and completed RT on 5/22/24. She started on Xeloda and had 1st week of June 2024. She is complaining of persistence diarrhea despite Imodium intake, currently on Xeloda 3tabs every 12 hrs for one week on and one week off.   8/29/24: Manoj is here for follow up visit. She has h/o bilateral invasive lobular carcinoma of the breast, ER/PA positive and HER2/deana negative, status post bilateral mastectomy, bilateral sentinel lymph node biopsy, and right axillary lymph node dissection in 1/2013, status post adjuvant chemotherapy and adjuvant chest wall radiotherapy, Completed 10-year of endocrine therapy with Letrozole in 7/2023. She had CT AP in 2/2024 for abdominal pain which incidentally showed a lesion in T6. She had MRI spine on 3/11/24 which showed a 2.7 cm lesion within the left aspect of the T6 vertebral body and 7 mm marrow replacing lesion within the T11 vertebral body.  PET/CT on 3/28/24 showed Intense pathologic FDG uptake (SUV 9.3) coregistering with sclerotic lesion on the left side of T6 suspicious for biologic tumor activity.  She had CT guided biopsy on 4/17/24 which showed metastatic carcinoma consistent from breast primary. She saw Dr. Anaya and received palliative RT 2000cGy to T6 lesion and completed RT on 5/22/24. She has been on Xeloda and did not tolerate well. She complains diarrhea, weight loss and eye irritation.   10/10/24 Manoj is here for follow up visit. She has h/o bilateral invasive lobular carcinoma of the breast, ER/PA positive and HER2/denaa negative, status post bilateral mastectomy, bilateral sentinel lymph node biopsy, and right axillary lymph node dissection in 1/2013, status post adjuvant chemotherapy and adjuvant chest wall radiotherapy, Completed 10-year of endocrine therapy with Letrozole in 7/2023. She had CT AP in 2/2024 for abdominal pain which incidentally showed a lesion in T6. She had MRI spine on 3/11/24 which showed a 2.7 cm lesion within the left aspect of the T6 vertebral body and 7 mm marrow replacing lesion within the T11 vertebral body.  PET/CT on 3/28/24 showed Intense pathologic FDG uptake (SUV 9.3) coregistering with sclerotic lesion on the left side of T6 suspicious for biologic tumor activity.  She had CT guided biopsy on 4/17/24 which showed metastatic carcinoma consistent from breast primary. She saw Dr. Anaya and received palliative RT 2000cGy to T6 lesion and completed RT on 5/22/24. She took Xeloda for 3 months and did not tolerate well. Xeloda was discontinued. She had a PET scan in 9.24 which showed interval resolution of uptake in T6 and New uptake in L1 and new pleural effussion. She completed XRT 5 fractions in 9.24.  She is here for follow up.  11/11/24 Manoj is here for follow up visit. She has h/o bilateral invasive lobular carcinoma of the breast, ER/PA positive and HER2/deana negative, status post bilateral mastectomy, bilateral sentinel lymph node biopsy, and right axillary lymph node dissection in 1/2013, status post adjuvant chemotherapy and adjuvant chest wall radiotherapy, Completed 10-year of endocrine therapy with Letrozole in 7/2023. She developed recurrent disease with a lesion in T6 in 2/2024. CT guided biopsy on 4/17/24 showed metastatic carcinoma consistent from breast primary. She saw Dr. Anaya and received palliative RT 2000cGy to T6 lesion and completed RT on 5/22/24. She took Xeloda for 3 months and did not tolerate well. Xeloda was discontinued. She had repeat PET/CT in 9.24 which showed interval resolution of uptake in T6 but new uptake in L1 and new pleural effusion. She completed XRT 5 fractions. She complains low back pain.   12/30/24 Manoj is here for follow up visit. Her spouse is with her. She has h/o bilateral invasive lobular carcinoma of the breast, ER/PA positive and HER2/deana negative, s/p bilateral mastectomy, bilateral sentinel lymph node biopsy, and right axillary lymph node dissection in 1/2013, s/p adjuvant chemotherapy and adjuvant chest wall radiotherapy, Completed 10-year of endocrine therapy with Letrozole in 7/2023. She developed recurrent disease with a lesion in T6 in 2/2024. CT guided biopsy on 4/17/24 showed metastatic carcinoma consistent from breast primary. She saw Dr. Anaya and received palliative RT 2000cGy to T6 lesion and completed RT on 5/22/24. She took Xeloda for 3 months and did not tolerate well. Xeloda was discontinued. She had repeat PET/CT in 9/16/24 which showed interval resolution of uptake in T6 but new uptake in L1 and new pleural effusion. MR Lumbar spine on 9/25/24 showed Infiltrative enhancing bone marrow signal abnormality within the T11 and L1 vertebral bodies most compatible with osseous metastatic disease. Mild compression deformity of L1, likely pathologic. Multilevel degenerative changes as described. She completed XRT 5 fractions on 10/1/24 -10/7/24. She has a history of fall on 9/2024. CT chest 12/12/24 showed no pleural effusion. She complains low back pain, taking Aleeve as needed with relief.

## 2025-01-18 NOTE — PHYSICAL EXAM
[Fully active, able to carry on all pre-disease performance without restriction] : Status 0 - Fully active, able to carry on all pre-disease performance without restriction [Normal] : affect appropriate [de-identified] : Lower abdominal fat necrosis. Abdominal scar present.  [de-identified] :  Status post bilateral mastectomy and autologous tissue reconstruction.  There is no skin lesion.  No palpable axillary lymphadenopathy. [de-identified] : Low back pain 2' metastatic disease.

## 2025-01-18 NOTE — ASSESSMENT
[FreeTextEntry1] : Assessment and Plan: # H/O Bilateral invasive lobular carcinoma of the breast, ER/LA positive and HER2/deana negative, status post bilateral mastectomy, bilateral sentinel lymph node biopsy, and right axillary lymph node dissection, status post adjuvant chemotherapy and adjuvant chest wall radiotherapy, Completed 10-year of endocrine therapy with Letrozole in 7/2023.  - S/P b/l mastectomy. There is no palpable abnormality.  # Recurrent breast cancer(ER/LA/Her 2 negative Biopsy proven) with T6 vertebral body lesion S/P XRT(5/24) now presenting with new L1 lesion S/P XRT  9.24 - MRI spine on 3/11/24 showed a 2.7 cm lesion within the left aspect of the T6 vertebral body and 7 mm marrow replacing lesion within the T11 vertebral body.  - PET/CT on 3/28/24 showed Intense pathologic FDG uptake (SUV 9.3) coregistering with sclerotic lesion on the left side of T6 suspicious for biologic tumor activity. No other sites of abnormal FDG uptake.  - CT guided core bx of the lesion in T6 vertebral body on 4/17/24 showed metastatic carcinoma, favor breast primary, ER/LA negative. Her2 is negative.  - S/P palliative RT 2000cGy to T6 lesion and completed RT on 5/22/24. - She took Xeloda for 3 months, 1500 mg twice a day, one week on and one week off. Due to frequent diarrhea, dose reduced to Xeloda 1000 mg q12h, 1 week on and 1 week off.  She developed multiple side effects and Xeloda was discontinued.  -- PET/CT in 9/16/24 showed interval resolution of uptake in T6 and New uptake in L1 and new pleural effusion.  -- MRI of lumbar spine 9/25/24 showed infiltrative enhancing bone marrow signal abnormality within the T11 and L1 vertebral bodies most compatible with osseous metastatic disease. Mild compression deformity of L1, likely pathologic. -- She received XRT 5 fractions 2000cGy to Vertebrae, Lumbar on 10/1/24 - 10/7/24. -- Recurrent breast cancer in the spine is triple negative. She had palliative RT x 2. Currently, there is no evidence of visceral disease or other site disease. Since her initial breast cancer was hormone receptor and bone met biopsy could have inadequate IHC staining for hormone receptor, she has been taking Letrozole.  -- Repeat PET/CT on 1/7/25 showed several new foci of pathologic FDG uptake in bones, suspicious for biologic tumor activity. Interval resolution of previously noted small right pleural effusion. -- In light of progression of disease, she is recommended to start chemotherapy with Taxol 80 mg weekly, 3 weeks on and one week off. The side effects of chemo were reviewed. She agreed with the treatment and signed the consent form. Will refer her for port placement by IR.  -- Order blood work: CBC, BMP, LFT, Mg, CEA and .  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 She saw neurosurgeon.   #Low back pain. NSAIDs as needed. F/U with Neurosurgery as scheduled.  # H/O elevated serum calcium. MM panel showed no evidence of M-spike. Serum light chain ration is normal. Repeat serum calcium is normal and PTH is also normal.  # Osteoporosis/osteopenia. Encourage regular exercise. She has history of elevated Calcium levels. Advised to take Vitamin 1000 units daily.  RTO for follow up in 4 weeks.

## 2025-01-18 NOTE — HISTORY OF PRESENT ILLNESS
[de-identified] : This 68-year-old female is here today for a followup visit.  She has a history of bilateral invasive lobular carcinoma, stage IIIA (pT1c N2a M0) in the right breast, and stage IA (pT1b N0 M0) in the left breast.  Both sided breast cancer were ER/DE positive and HER2/deana negative.  She had bilateral mastectomy, bilateral sentinel lymph node biopsy, and right axillary lymph node dissection at [facility] on 01/08/2013.  She received adjuvant chemotherapy with Adriamycin and Cytoxan followed by paclitaxel.  She also received post mastectomy adjuvant radiotherapy to right side chest wall.  She has been on adjuvant endocrine therapy with Femara since 7/2013  and has been tolerating well.  She had genetic test for BRCA mutations.  She is negative for BRCA1/2 mutations.  In 10/2014, she had a bone density at [facility], which showed osteopenia. Her latest bone density was done 7/21/16 which showed osteopenia. MRI of breasts was done on 1/30/17 which showed no MR evidence of malignancy in either breast. Recommendation: Unless otherwise indicated by clinical findings, annual screening mammography recommended. This can be alternated at 6 month intervals with bilateral screening breast MRI.\par   She has been taking calcium and vitamin D supplements. Latest colonoscopy 11/2016 pt states it was negative. Saw Dr. Garcia 1/2017.\par  She saw Dr. Rankin recently. She is scheduled to have a revision of her lower abdominal fat necrosis on 1/2018.\par  \par   [de-identified] : 6/8/18: She stopped letrozole few months ago as she was found to be in Afib by her cardiologist on Pre-op clearance for abdominal fat necrosis. However her cardiologist () told her that you can resume Letrozole. She had surgery for abdominal fat necrosis and she is going for surgery for incisional hernia by .   12/13/18: The patient is here for followup visit. She has been taking Letrozole daily (since 7/2013) and tolerating well. She had hernia repair surgery. She complains some pain in her knees. She had MRI breast in 1 2017. There was no suspicious finding.  6/14/19; The patient is here for followup visit. She has been taking Letrozole daily (since 7/2013) and tolerating well. She complains some pain in her knees. She does not have other new complains. She has mild elevated calcium. PHT was normal.  12/13/2019 :  The patient is here for follow up. She has been on letrozole daily and tolerating well. She had a bone density 12/2/109 which showed osteopenia of femur and hip. She is not taking calcium and vit D since her last calcium was elevated 10.7. She had a recent US breasts 12/2019 for breast pain which was negative. She was seen by her cardiologist and had blood work in the summer. She was told that she has severe anemia. She started her on iron pills. She denies any vaginal bleeding or rectal bleeding  6/12/2020: The patient is here for follow up. She had bilateral invasive lobular carcinoma of the breast, ER/CA positive and HER2/deana negative, status post bilateral mastectomy, bilateral sentinel lymph node biopsy, and right axillary lymph node dissection in 1/2013, status post adjuvant chemotherapy and adjuvant chest wall radiotherapy, currently on adjuvant endocrine therapy with letrozole and tolerating well. She had a bone density 12/2/19 which showed osteopenia of femur and hip. She is not taking calcium and vit D since her last calcium was elevated 10.7. She had a recent US breasts 12/2019 for breast pain which was negative. She does not have new complains.  12/02/2020: MANOJ is here for follow up visit for bilateral invasive lobular carcinoma of the breast, ER/CA positive and HER2/deana negative, status post bilateral mastectomy, bilateral sentinel lymph node biopsy, and right axillary lymph node dissection in 1/2013, status post adjuvant chemotherapy and adjuvant chest wall radiotherapy, currently on adjuvant endocrine therapy with letrozole and tolerating well besides mild arthralgia. She had a bone density 12/2/19 which showed osteopenia of femur and hip. She continues on Vitamin D daily. She denies any new breast symptoms at this time. In addition, patient was diagnosed with COVID in 3/2020.   06/02/2021: MANOJ is here for follow up visit for bilateral invasive lobular carcinoma of the breast, ER/CA positive and HER2/deana negative, status post bilateral mastectomy, bilateral sentinel lymph node biopsy, and right axillary lymph node dissection in 1/2013, status post adjuvant chemotherapy and adjuvant chest wall radiotherapy, currently on adjuvant endocrine therapy with letrozole since 7/2013 and tolerating well besides mild arthralgia. She had a bone density 12/2/19 which showed osteopenia of femur and hip. She continues on Vitamin D daily. She denies any new breast symptoms at this time. she had right breast skin tag biopsied by dermatologist; which was benign.   1/5/22 MANOJ is here for follow up visit for bilateral invasive lobular carcinoma of the breast, ER/CA positive and HER2/deana negative, status post bilateral mastectomy, bilateral sentinel lymph node biopsy, and right axillary lymph node dissection in 1/2013, status post adjuvant chemotherapy and adjuvant chest wall radiotherapy, currently on adjuvant endocrine therapy with letrozole since 7/2013 and tolerating well besides mild arthralgia. She had a bone density 12/7/21 which showed worsening osteopenia of femur and hip, T score -2.4. She is not complaint with Vitamin D, does not take calcium due to mildly elevated Calcium. She denies any new breast symptoms at this time. She was experiencing lower abdominal and pelvic pain in Sept had US which was unremarkable. In addition, had Thyroid US due to hypercalcemia which was unremarkable, In 8/2021 TSH 2.36, Calcium 10, and PTH was 78.   7/21/22: Manoj is here for follow up visit for bilateral invasive lobular carcinoma of the breast, ER/CA positive and HER2/deana negative, status post bilateral mastectomy, bilateral sentinel lymph node biopsy, and right axillary lymph node dissection in 1/2013, status post adjuvant chemotherapy and adjuvant chest wall radiotherapy, currently on adjuvant endocrine therapy with letrozole since 7/2013 and tolerating well besides mild arthralgia. She had a bone density 12/7/21 which showed worsening osteopenia of femur and hip, T score -2.4. She takes Vitamin D but not calcium because her calcium has been mildly elevated. She was found to have large hiatal hernia and she has had on and off diarrhea for long time. She has been seen by Dr. Borjas.   01/26/2023 Manoj is here for follow up visit for bilateral invasive lobular carcinoma of the breast, ER/CA positive and HER2/deana negative, status post bilateral mastectomy, bilateral sentinel lymph node biopsy, and right axillary lymph node dissection in 1/2013, status post adjuvant chemotherapy and adjuvant chest wall radiotherapy, currently on adjuvant endocrine therapy with letrozole since 7/2013 and tolerating well besides mild arthralgia. She had a bone density 12/7/21 which showed worsening osteopenia of femur and hip, T score -2.4. She takes Vitamin D but not calcium because her calcium has been mildly elevated. She was found to have large hiatal hernia and surgery on 11/21/22. Her Hb drooped to 9.5 g/dl after surgery. She was found to have high serum calcium but repeat calcium was normal. PTH was also within normal limit.  7/20/23 Manoj is here for follow up visit for bilateral invasive lobular carcinoma of the breast, ER/CA positive and HER2/deana negative, status post bilateral mastectomy, bilateral sentinel lymph node biopsy, and right axillary lymph node dissection in 1/2013, status post adjuvant chemotherapy and adjuvant chest wall radiotherapy, currently on adjuvant endocrine therapy with letrozole since 7/2013 and tolerating well besides mild arthralgia. She had a bone density 12/7/21 which showed worsening osteopenia of femur and hip, T score -2.4. She takes Vitamin D but not calcium because her calcium has been mildly elevated. Repeat serum calcium normal, MM panel normal. She was found to have large hiatal hernia and surgery on 11/21/22. Her Hb drooped to 9.5 g/dl after surgery, now normal. After hernia repair she was having symptoms consistent with vagal nerve mediated gastroparesis, she then had endoscopic pyloromyotomy, symptoms resolved.   1/25/2024: Manoj is here for follow up visit for bilateral invasive lobular carcinoma of the breast, ER/CA positive and HER2/deana negative, status post bilateral mastectomy, bilateral sentinel lymph node biopsy, and right axillary lymph node dissection in 1/2013, status post adjuvant chemotherapy and adjuvant chest wall radiotherapy, currently on adjuvant endocrine therapy with letrozole since 7/2013 and tolerating well besides mild arthralgia. She Completed 10 years treatment of Letrozole in 7/2023. She had a bone density 12/8/23 Showed:         ----------------------------------------------------------------- AP Spine (L1-L4)         0.998   -0.4      1.8     Normal Femoral Neck (Left) 0.552   -2.7     -0.7     Osteoporosis Total Hip (Left) 0.676   -2.2     -0.6     Osteopenia -which showed Osteoporosis of femur Neck and Osteopenia in the Hip. She takes Vitamin D.  After hernia repair, she was having symptoms consistent with vagal nerve mediated gastroparesis, she then had endoscopic pyloromyotomy, symptoms resolved.  She starts feeling better now.   3/29/24: Manoj is here for follow up visit. She has h/o bilateral invasive lobular carcinoma of the breast, ER/CA positive and HER2/deana negative, status post bilateral mastectomy, bilateral sentinel lymph node biopsy, and right axillary lymph node dissection in 1/2013, status post adjuvant chemotherapy and adjuvant chest wall radiotherapy, currently on adjuvant endocrine therapy with letrozole since 7/2013. She had CT AP in 2/2024 for abdominal pain which incidentally showed a lesion in T6. She had MRI spine on 3/11/24 which showed a 2.7 cm lesion within the left aspect of the T6 vertebral body and 7 mm marrow replacing lesion within the T11 vertebral body.  PET/CT on 3/28/24 showed Intense pathologic FDG uptake (SUV 9.3) coregistering with sclerotic lesion on the left side of T6 suspicious for biologic tumor activity. No other sites of abnormal FDG uptake. She is here today to discuss further management plan.  4/29/24: Manoj is here for follow up visit. She has h/o bilateral invasive lobular carcinoma of the breast, ER/CA positive and HER2/deana negative, status post bilateral mastectomy, bilateral sentinel lymph node biopsy, and right axillary lymph node dissection in 1/2013, status post adjuvant chemotherapy and adjuvant chest wall radiotherapy, Completed 10-year of endocrine therapy with Letrozole in 7/2023. She had CT AP in 2/2024 for abdominal pain which incidentally showed a lesion in T6. She had MRI spine on 3/11/24 which showed a 2.7 cm lesion within the left aspect of the T6 vertebral body and 7 mm marrow replacing lesion within the T11 vertebral body.  PET/CT on 3/28/24 showed Intense pathologic FDG uptake (SUV 9.3) coregistering with sclerotic lesion on the left side of T6 suspicious for biologic tumor activity. No other sites of abnormal FDG uptake. She had CT guided biopsy on 4/17/24 and is here today to discuss the result.     5/30/24: Manoj is here for follow up visit. She has h/o bilateral invasive lobular carcinoma of the breast, ER/CA positive and HER2/deana negative, status post bilateral mastectomy, bilateral sentinel lymph node biopsy, and right axillary lymph node dissection in 1/2013, status post adjuvant chemotherapy and adjuvant chest wall radiotherapy, Completed 10-year of endocrine therapy with Letrozole in 7/2023. She had CT AP in 2/2024 for abdominal pain which incidentally showed a lesion in T6. She had MRI spine on 3/11/24 which showed a 2.7 cm lesion within the left aspect of the T6 vertebral body and 7 mm marrow replacing lesion within the T11 vertebral body.  PET/CT on 3/28/24 showed Intense pathologic FDG uptake (SUV 9.3) coregistering with sclerotic lesion on the left side of T6 suspicious for biologic tumor activity.  She had CT guided biopsy on 4/17/24 which showed metastatic carcinoma consistent from breast primary. She saw Dr. Anaya and received palliative RT 2000cGy to T6 lesion and completed RT on 5/22/24.  6/27/24: Manoj is here for follow up visit. She has h/o bilateral invasive lobular carcinoma of the breast, ER/CA positive and HER2/deana negative, status post bilateral mastectomy, bilateral sentinel lymph node biopsy, and right axillary lymph node dissection in 1/2013, status post adjuvant chemotherapy and adjuvant chest wall radiotherapy, Completed 10-year of endocrine therapy with Letrozole in 7/2023. She had CT AP in 2/2024 for abdominal pain which incidentally showed a lesion in T6. She had MRI spine on 3/11/24 which showed a 2.7 cm lesion within the left aspect of the T6 vertebral body and 7 mm marrow replacing lesion within the T11 vertebral body.  PET/CT on 3/28/24 showed Intense pathologic FDG uptake (SUV 9.3) coregistering with sclerotic lesion on the left side of T6 suspicious for biologic tumor activity.  She had CT guided biopsy on 4/17/24 which showed metastatic carcinoma consistent from breast primary. She saw Dr. Anaya and received palliative RT 2000cGy to T6 lesion and completed RT on 5/22/24. She started on Xeloda and had 1st week treatment. She had loose stool but otherwise tolerated well.   08/01/24: bilateral sentinel lymph node biopsy, and right axillary lymph node dissection in 1/2013, status post adjuvant chemotherapy and adjuvant chest wall radiotherapy, Completed 10-year of endocrine therapy with Letrozole in 7/2023. She had CT AP in 2/2024 for abdominal pain which incidentally showed a lesion in T6. She had MRI spine on 3/11/24 which showed a 2.7 cm lesion within the left aspect of the T6 vertebral body and 7 mm marrow replacing lesion within the T11 vertebral body.  PET/CT on 3/28/24 showed Intense pathologic FDG uptake (SUV 9.3) coregistering with sclerotic lesion on the left side of T6 suspicious for biologic tumor activity.  She had CT guided biopsy on 4/17/24 which showed metastatic carcinoma consistent from breast primary. She saw Dr. Anaya and received palliative RT 2000cGy to T6 lesion and completed RT on 5/22/24. She started on Xeloda and had 1st week of June 2024. She is complaining of persistence diarrhea despite Imodium intake, currently on Xeloda 3tabs every 12 hrs for one week on and one week off.   8/29/24: Manoj is here for follow up visit. She has h/o bilateral invasive lobular carcinoma of the breast, ER/CA positive and HER2/deana negative, status post bilateral mastectomy, bilateral sentinel lymph node biopsy, and right axillary lymph node dissection in 1/2013, status post adjuvant chemotherapy and adjuvant chest wall radiotherapy, Completed 10-year of endocrine therapy with Letrozole in 7/2023. She had CT AP in 2/2024 for abdominal pain which incidentally showed a lesion in T6. She had MRI spine on 3/11/24 which showed a 2.7 cm lesion within the left aspect of the T6 vertebral body and 7 mm marrow replacing lesion within the T11 vertebral body.  PET/CT on 3/28/24 showed Intense pathologic FDG uptake (SUV 9.3) coregistering with sclerotic lesion on the left side of T6 suspicious for biologic tumor activity.  She had CT guided biopsy on 4/17/24 which showed metastatic carcinoma consistent from breast primary. She saw Dr. Anaya and received palliative RT 2000cGy to T6 lesion and completed RT on 5/22/24. She has been on Xeloda and did not tolerate well. She complains diarrhea, weight loss and eye irritation.   10/10/24 Manoj is here for follow up visit. She has h/o bilateral invasive lobular carcinoma of the breast, ER/CA positive and HER2/deana negative, status post bilateral mastectomy, bilateral sentinel lymph node biopsy, and right axillary lymph node dissection in 1/2013, status post adjuvant chemotherapy and adjuvant chest wall radiotherapy, Completed 10-year of endocrine therapy with Letrozole in 7/2023. She had CT AP in 2/2024 for abdominal pain which incidentally showed a lesion in T6. She had MRI spine on 3/11/24 which showed a 2.7 cm lesion within the left aspect of the T6 vertebral body and 7 mm marrow replacing lesion within the T11 vertebral body.  PET/CT on 3/28/24 showed Intense pathologic FDG uptake (SUV 9.3) coregistering with sclerotic lesion on the left side of T6 suspicious for biologic tumor activity.  She had CT guided biopsy on 4/17/24 which showed metastatic carcinoma consistent from breast primary. She saw Dr. Anaya and received palliative RT 2000cGy to T6 lesion and completed RT on 5/22/24. She took Xeloda for 3 months and did not tolerate well. Xeloda was discontinued. She had a PET scan in 9.24 which showed interval resolution of uptake in T6 and New uptake in L1 and new pleural effussion. She completed XRT 5 fractions in 9.24.  She is here for follow up.  11/11/24 Manoj is here for follow up visit. She has h/o bilateral invasive lobular carcinoma of the breast, ER/CA positive and HER2/deana negative, status post bilateral mastectomy, bilateral sentinel lymph node biopsy, and right axillary lymph node dissection in 1/2013, status post adjuvant chemotherapy and adjuvant chest wall radiotherapy, Completed 10-year of endocrine therapy with Letrozole in 7/2023. She developed recurrent disease with a lesion in T6 in 2/2024. CT guided biopsy on 4/17/24 showed metastatic carcinoma consistent from breast primary. She saw Dr. Anaya and received palliative RT 2000cGy to T6 lesion and completed RT on 5/22/24. She took Xeloda for 3 months and did not tolerate well. Xeloda was discontinued. She had repeat PET/CT in 9.24 which showed interval resolution of uptake in T6 but new uptake in L1 and new pleural effusion. She completed XRT 5 fractions. She complains low back pain.   12/30/24 Manoj is here for follow up visit. Her spouse is with her. She has h/o bilateral invasive lobular carcinoma of the breast, ER/CA positive and HER2/deana negative, s/p bilateral mastectomy, bilateral sentinel lymph node biopsy, and right axillary lymph node dissection in 1/2013, s/p adjuvant chemotherapy and adjuvant chest wall radiotherapy, Completed 10-year of endocrine therapy with Letrozole in 7/2023. She developed recurrent disease with a lesion in T6 in 2/2024. CT guided biopsy on 4/17/24 showed metastatic carcinoma consistent from breast primary. She saw Dr. Anaya and received palliative RT 2000cGy to T6 lesion and completed RT on 5/22/24. She took Xeloda for 3 months and did not tolerate well. Xeloda was discontinued. She had repeat PET/CT in 9/16/24 which showed interval resolution of uptake in T6 but new uptake in L1 and new pleural effusion. MR Lumbar spine on 9/25/24 showed Infiltrative enhancing bone marrow signal abnormality within the T11 and L1 vertebral bodies most compatible with osseous metastatic disease. Mild compression deformity of L1, likely pathologic. Multilevel degenerative changes as described. She completed XRT 5 fractions on 10/1/24 -10/7/24. She has a history of fall on 9/2024. CT chest 12/12/24 showed no pleural effusion. She complains low back pain, taking Aleeve as needed with relief.  1/14/25: Manoj is here for follow up visit. Her spouse is with her. She has h/o bilateral invasive lobular carcinoma of the breast, ER/CA positive and HER2/deana negative, s/p bilateral mastectomy, bilateral sentinel lymph node biopsy, and right axillary lymph node dissection in 1/2013, s/p adjuvant chemotherapy and adjuvant chest wall radiotherapy, Completed 10-year of endocrine therapy with Letrozole in 7/2023. She developed recurrent disease with a lesion in T6 in 2/2024. CT guided biopsy on 4/17/24 showed metastatic carcinoma consistent from breast primary. She saw Dr. Anaya and received palliative RT 2000cGy to T6 lesion and completed RT on 5/22/24. She took Xeloda for 3 months and did not tolerate. Xeloda was discontinued. She had repeat PET/CT in 9/16/24 which showed interval resolution of uptake in T6 but new uptake in L1 and new pleural effusion.  She completed XRT 5 fractions on 10/1/24 -10/7/24. She has been taking Letrozole. She feels some aches and pains.

## 2025-01-18 NOTE — HISTORY OF PRESENT ILLNESS
[de-identified] : This 68-year-old female is here today for a followup visit.  She has a history of bilateral invasive lobular carcinoma, stage IIIA (pT1c N2a M0) in the right breast, and stage IA (pT1b N0 M0) in the left breast.  Both sided breast cancer were ER/UT positive and HER2/deana negative.  She had bilateral mastectomy, bilateral sentinel lymph node biopsy, and right axillary lymph node dissection at [facility] on 01/08/2013.  She received adjuvant chemotherapy with Adriamycin and Cytoxan followed by paclitaxel.  She also received post mastectomy adjuvant radiotherapy to right side chest wall.  She has been on adjuvant endocrine therapy with Femara since 7/2013  and has been tolerating well.  She had genetic test for BRCA mutations.  She is negative for BRCA1/2 mutations.  In 10/2014, she had a bone density at [facility], which showed osteopenia. Her latest bone density was done 7/21/16 which showed osteopenia. MRI of breasts was done on 1/30/17 which showed no MR evidence of malignancy in either breast. Recommendation: Unless otherwise indicated by clinical findings, annual screening mammography recommended. This can be alternated at 6 month intervals with bilateral screening breast MRI.\par   She has been taking calcium and vitamin D supplements. Latest colonoscopy 11/2016 pt states it was negative. Saw Dr. Garcia 1/2017.\par  She saw Dr. Rankin recently. She is scheduled to have a revision of her lower abdominal fat necrosis on 1/2018.\par  \par   [de-identified] : 6/8/18: She stopped letrozole few months ago as she was found to be in Afib by her cardiologist on Pre-op clearance for abdominal fat necrosis. However her cardiologist () told her that you can resume Letrozole. She had surgery for abdominal fat necrosis and she is going for surgery for incisional hernia by .   12/13/18: The patient is here for followup visit. She has been taking Letrozole daily (since 7/2013) and tolerating well. She had hernia repair surgery. She complains some pain in her knees. She had MRI breast in 1 2017. There was no suspicious finding.  6/14/19; The patient is here for followup visit. She has been taking Letrozole daily (since 7/2013) and tolerating well. She complains some pain in her knees. She does not have other new complains. She has mild elevated calcium. PHT was normal.  12/13/2019 :  The patient is here for follow up. She has been on letrozole daily and tolerating well. She had a bone density 12/2/109 which showed osteopenia of femur and hip. She is not taking calcium and vit D since her last calcium was elevated 10.7. She had a recent US breasts 12/2019 for breast pain which was negative. She was seen by her cardiologist and had blood work in the summer. She was told that she has severe anemia. She started her on iron pills. She denies any vaginal bleeding or rectal bleeding  6/12/2020: The patient is here for follow up. She had bilateral invasive lobular carcinoma of the breast, ER/ME positive and HER2/deana negative, status post bilateral mastectomy, bilateral sentinel lymph node biopsy, and right axillary lymph node dissection in 1/2013, status post adjuvant chemotherapy and adjuvant chest wall radiotherapy, currently on adjuvant endocrine therapy with letrozole and tolerating well. She had a bone density 12/2/19 which showed osteopenia of femur and hip. She is not taking calcium and vit D since her last calcium was elevated 10.7. She had a recent US breasts 12/2019 for breast pain which was negative. She does not have new complains.  12/02/2020: MANOJ is here for follow up visit for bilateral invasive lobular carcinoma of the breast, ER/ME positive and HER2/deana negative, status post bilateral mastectomy, bilateral sentinel lymph node biopsy, and right axillary lymph node dissection in 1/2013, status post adjuvant chemotherapy and adjuvant chest wall radiotherapy, currently on adjuvant endocrine therapy with letrozole and tolerating well besides mild arthralgia. She had a bone density 12/2/19 which showed osteopenia of femur and hip. She continues on Vitamin D daily. She denies any new breast symptoms at this time. In addition, patient was diagnosed with COVID in 3/2020.   06/02/2021: MANOJ is here for follow up visit for bilateral invasive lobular carcinoma of the breast, ER/ME positive and HER2/deana negative, status post bilateral mastectomy, bilateral sentinel lymph node biopsy, and right axillary lymph node dissection in 1/2013, status post adjuvant chemotherapy and adjuvant chest wall radiotherapy, currently on adjuvant endocrine therapy with letrozole since 7/2013 and tolerating well besides mild arthralgia. She had a bone density 12/2/19 which showed osteopenia of femur and hip. She continues on Vitamin D daily. She denies any new breast symptoms at this time. she had right breast skin tag biopsied by dermatologist; which was benign.   1/5/22 MANOJ is here for follow up visit for bilateral invasive lobular carcinoma of the breast, ER/ME positive and HER2/deana negative, status post bilateral mastectomy, bilateral sentinel lymph node biopsy, and right axillary lymph node dissection in 1/2013, status post adjuvant chemotherapy and adjuvant chest wall radiotherapy, currently on adjuvant endocrine therapy with letrozole since 7/2013 and tolerating well besides mild arthralgia. She had a bone density 12/7/21 which showed worsening osteopenia of femur and hip, T score -2.4. She is not complaint with Vitamin D, does not take calcium due to mildly elevated Calcium. She denies any new breast symptoms at this time. She was experiencing lower abdominal and pelvic pain in Sept had US which was unremarkable. In addition, had Thyroid US due to hypercalcemia which was unremarkable, In 8/2021 TSH 2.36, Calcium 10, and PTH was 78.   7/21/22: Manoj is here for follow up visit for bilateral invasive lobular carcinoma of the breast, ER/ME positive and HER2/deana negative, status post bilateral mastectomy, bilateral sentinel lymph node biopsy, and right axillary lymph node dissection in 1/2013, status post adjuvant chemotherapy and adjuvant chest wall radiotherapy, currently on adjuvant endocrine therapy with letrozole since 7/2013 and tolerating well besides mild arthralgia. She had a bone density 12/7/21 which showed worsening osteopenia of femur and hip, T score -2.4. She takes Vitamin D but not calcium because her calcium has been mildly elevated. She was found to have large hiatal hernia and she has had on and off diarrhea for long time. She has been seen by Dr. Borjas.   01/26/2023 Manoj is here for follow up visit for bilateral invasive lobular carcinoma of the breast, ER/ME positive and HER2/deana negative, status post bilateral mastectomy, bilateral sentinel lymph node biopsy, and right axillary lymph node dissection in 1/2013, status post adjuvant chemotherapy and adjuvant chest wall radiotherapy, currently on adjuvant endocrine therapy with letrozole since 7/2013 and tolerating well besides mild arthralgia. She had a bone density 12/7/21 which showed worsening osteopenia of femur and hip, T score -2.4. She takes Vitamin D but not calcium because her calcium has been mildly elevated. She was found to have large hiatal hernia and surgery on 11/21/22. Her Hb drooped to 9.5 g/dl after surgery. She was found to have high serum calcium but repeat calcium was normal. PTH was also within normal limit.  7/20/23 Manoj is here for follow up visit for bilateral invasive lobular carcinoma of the breast, ER/ME positive and HER2/deana negative, status post bilateral mastectomy, bilateral sentinel lymph node biopsy, and right axillary lymph node dissection in 1/2013, status post adjuvant chemotherapy and adjuvant chest wall radiotherapy, currently on adjuvant endocrine therapy with letrozole since 7/2013 and tolerating well besides mild arthralgia. She had a bone density 12/7/21 which showed worsening osteopenia of femur and hip, T score -2.4. She takes Vitamin D but not calcium because her calcium has been mildly elevated. Repeat serum calcium normal, MM panel normal. She was found to have large hiatal hernia and surgery on 11/21/22. Her Hb drooped to 9.5 g/dl after surgery, now normal. After hernia repair she was having symptoms consistent with vagal nerve mediated gastroparesis, she then had endoscopic pyloromyotomy, symptoms resolved.   1/25/2024: Manoj is here for follow up visit for bilateral invasive lobular carcinoma of the breast, ER/ME positive and HER2/deana negative, status post bilateral mastectomy, bilateral sentinel lymph node biopsy, and right axillary lymph node dissection in 1/2013, status post adjuvant chemotherapy and adjuvant chest wall radiotherapy, currently on adjuvant endocrine therapy with letrozole since 7/2013 and tolerating well besides mild arthralgia. She Completed 10 years treatment of Letrozole in 7/2023. She had a bone density 12/8/23 Showed:         ----------------------------------------------------------------- AP Spine (L1-L4)         0.998   -0.4      1.8     Normal Femoral Neck (Left) 0.552   -2.7     -0.7     Osteoporosis Total Hip (Left) 0.676   -2.2     -0.6     Osteopenia -which showed Osteoporosis of femur Neck and Osteopenia in the Hip. She takes Vitamin D.  After hernia repair, she was having symptoms consistent with vagal nerve mediated gastroparesis, she then had endoscopic pyloromyotomy, symptoms resolved.  She starts feeling better now.   3/29/24: Manoj is here for follow up visit. She has h/o bilateral invasive lobular carcinoma of the breast, ER/ME positive and HER2/deana negative, status post bilateral mastectomy, bilateral sentinel lymph node biopsy, and right axillary lymph node dissection in 1/2013, status post adjuvant chemotherapy and adjuvant chest wall radiotherapy, currently on adjuvant endocrine therapy with letrozole since 7/2013. She had CT AP in 2/2024 for abdominal pain which incidentally showed a lesion in T6. She had MRI spine on 3/11/24 which showed a 2.7 cm lesion within the left aspect of the T6 vertebral body and 7 mm marrow replacing lesion within the T11 vertebral body.  PET/CT on 3/28/24 showed Intense pathologic FDG uptake (SUV 9.3) coregistering with sclerotic lesion on the left side of T6 suspicious for biologic tumor activity. No other sites of abnormal FDG uptake. She is here today to discuss further management plan.  4/29/24: Manoj is here for follow up visit. She has h/o bilateral invasive lobular carcinoma of the breast, ER/ME positive and HER2/deana negative, status post bilateral mastectomy, bilateral sentinel lymph node biopsy, and right axillary lymph node dissection in 1/2013, status post adjuvant chemotherapy and adjuvant chest wall radiotherapy, Completed 10-year of endocrine therapy with Letrozole in 7/2023. She had CT AP in 2/2024 for abdominal pain which incidentally showed a lesion in T6. She had MRI spine on 3/11/24 which showed a 2.7 cm lesion within the left aspect of the T6 vertebral body and 7 mm marrow replacing lesion within the T11 vertebral body.  PET/CT on 3/28/24 showed Intense pathologic FDG uptake (SUV 9.3) coregistering with sclerotic lesion on the left side of T6 suspicious for biologic tumor activity. No other sites of abnormal FDG uptake. She had CT guided biopsy on 4/17/24 and is here today to discuss the result.     5/30/24: Manoj is here for follow up visit. She has h/o bilateral invasive lobular carcinoma of the breast, ER/ME positive and HER2/deana negative, status post bilateral mastectomy, bilateral sentinel lymph node biopsy, and right axillary lymph node dissection in 1/2013, status post adjuvant chemotherapy and adjuvant chest wall radiotherapy, Completed 10-year of endocrine therapy with Letrozole in 7/2023. She had CT AP in 2/2024 for abdominal pain which incidentally showed a lesion in T6. She had MRI spine on 3/11/24 which showed a 2.7 cm lesion within the left aspect of the T6 vertebral body and 7 mm marrow replacing lesion within the T11 vertebral body.  PET/CT on 3/28/24 showed Intense pathologic FDG uptake (SUV 9.3) coregistering with sclerotic lesion on the left side of T6 suspicious for biologic tumor activity.  She had CT guided biopsy on 4/17/24 which showed metastatic carcinoma consistent from breast primary. She saw Dr. Anaya and received palliative RT 2000cGy to T6 lesion and completed RT on 5/22/24.  6/27/24: Manoj is here for follow up visit. She has h/o bilateral invasive lobular carcinoma of the breast, ER/ME positive and HER2/deana negative, status post bilateral mastectomy, bilateral sentinel lymph node biopsy, and right axillary lymph node dissection in 1/2013, status post adjuvant chemotherapy and adjuvant chest wall radiotherapy, Completed 10-year of endocrine therapy with Letrozole in 7/2023. She had CT AP in 2/2024 for abdominal pain which incidentally showed a lesion in T6. She had MRI spine on 3/11/24 which showed a 2.7 cm lesion within the left aspect of the T6 vertebral body and 7 mm marrow replacing lesion within the T11 vertebral body.  PET/CT on 3/28/24 showed Intense pathologic FDG uptake (SUV 9.3) coregistering with sclerotic lesion on the left side of T6 suspicious for biologic tumor activity.  She had CT guided biopsy on 4/17/24 which showed metastatic carcinoma consistent from breast primary. She saw Dr. Anaya and received palliative RT 2000cGy to T6 lesion and completed RT on 5/22/24. She started on Xeloda and had 1st week treatment. She had loose stool but otherwise tolerated well.   08/01/24: bilateral sentinel lymph node biopsy, and right axillary lymph node dissection in 1/2013, status post adjuvant chemotherapy and adjuvant chest wall radiotherapy, Completed 10-year of endocrine therapy with Letrozole in 7/2023. She had CT AP in 2/2024 for abdominal pain which incidentally showed a lesion in T6. She had MRI spine on 3/11/24 which showed a 2.7 cm lesion within the left aspect of the T6 vertebral body and 7 mm marrow replacing lesion within the T11 vertebral body.  PET/CT on 3/28/24 showed Intense pathologic FDG uptake (SUV 9.3) coregistering with sclerotic lesion on the left side of T6 suspicious for biologic tumor activity.  She had CT guided biopsy on 4/17/24 which showed metastatic carcinoma consistent from breast primary. She saw Dr. Anaya and received palliative RT 2000cGy to T6 lesion and completed RT on 5/22/24. She started on Xeloda and had 1st week of June 2024. She is complaining of persistence diarrhea despite Imodium intake, currently on Xeloda 3tabs every 12 hrs for one week on and one week off.   8/29/24: Manoj is here for follow up visit. She has h/o bilateral invasive lobular carcinoma of the breast, ER/ME positive and HER2/deana negative, status post bilateral mastectomy, bilateral sentinel lymph node biopsy, and right axillary lymph node dissection in 1/2013, status post adjuvant chemotherapy and adjuvant chest wall radiotherapy, Completed 10-year of endocrine therapy with Letrozole in 7/2023. She had CT AP in 2/2024 for abdominal pain which incidentally showed a lesion in T6. She had MRI spine on 3/11/24 which showed a 2.7 cm lesion within the left aspect of the T6 vertebral body and 7 mm marrow replacing lesion within the T11 vertebral body.  PET/CT on 3/28/24 showed Intense pathologic FDG uptake (SUV 9.3) coregistering with sclerotic lesion on the left side of T6 suspicious for biologic tumor activity.  She had CT guided biopsy on 4/17/24 which showed metastatic carcinoma consistent from breast primary. She saw Dr. Anaya and received palliative RT 2000cGy to T6 lesion and completed RT on 5/22/24. She has been on Xeloda and did not tolerate well. She complains diarrhea, weight loss and eye irritation.   10/10/24 Manoj is here for follow up visit. She has h/o bilateral invasive lobular carcinoma of the breast, ER/ME positive and HER2/deana negative, status post bilateral mastectomy, bilateral sentinel lymph node biopsy, and right axillary lymph node dissection in 1/2013, status post adjuvant chemotherapy and adjuvant chest wall radiotherapy, Completed 10-year of endocrine therapy with Letrozole in 7/2023. She had CT AP in 2/2024 for abdominal pain which incidentally showed a lesion in T6. She had MRI spine on 3/11/24 which showed a 2.7 cm lesion within the left aspect of the T6 vertebral body and 7 mm marrow replacing lesion within the T11 vertebral body.  PET/CT on 3/28/24 showed Intense pathologic FDG uptake (SUV 9.3) coregistering with sclerotic lesion on the left side of T6 suspicious for biologic tumor activity.  She had CT guided biopsy on 4/17/24 which showed metastatic carcinoma consistent from breast primary. She saw Dr. Anaya and received palliative RT 2000cGy to T6 lesion and completed RT on 5/22/24. She took Xeloda for 3 months and did not tolerate well. Xeloda was discontinued. She had a PET scan in 9.24 which showed interval resolution of uptake in T6 and New uptake in L1 and new pleural effussion. She completed XRT 5 fractions in 9.24.  She is here for follow up.  11/11/24 Manoj is here for follow up visit. She has h/o bilateral invasive lobular carcinoma of the breast, ER/ME positive and HER2/deana negative, status post bilateral mastectomy, bilateral sentinel lymph node biopsy, and right axillary lymph node dissection in 1/2013, status post adjuvant chemotherapy and adjuvant chest wall radiotherapy, Completed 10-year of endocrine therapy with Letrozole in 7/2023. She developed recurrent disease with a lesion in T6 in 2/2024. CT guided biopsy on 4/17/24 showed metastatic carcinoma consistent from breast primary. She saw Dr. Anaya and received palliative RT 2000cGy to T6 lesion and completed RT on 5/22/24. She took Xeloda for 3 months and did not tolerate well. Xeloda was discontinued. She had repeat PET/CT in 9.24 which showed interval resolution of uptake in T6 but new uptake in L1 and new pleural effusion. She completed XRT 5 fractions. She complains low back pain.   12/30/24 Manoj is here for follow up visit. Her spouse is with her. She has h/o bilateral invasive lobular carcinoma of the breast, ER/ME positive and HER2/deana negative, s/p bilateral mastectomy, bilateral sentinel lymph node biopsy, and right axillary lymph node dissection in 1/2013, s/p adjuvant chemotherapy and adjuvant chest wall radiotherapy, Completed 10-year of endocrine therapy with Letrozole in 7/2023. She developed recurrent disease with a lesion in T6 in 2/2024. CT guided biopsy on 4/17/24 showed metastatic carcinoma consistent from breast primary. She saw Dr. Anaya and received palliative RT 2000cGy to T6 lesion and completed RT on 5/22/24. She took Xeloda for 3 months and did not tolerate well. Xeloda was discontinued. She had repeat PET/CT in 9/16/24 which showed interval resolution of uptake in T6 but new uptake in L1 and new pleural effusion. MR Lumbar spine on 9/25/24 showed Infiltrative enhancing bone marrow signal abnormality within the T11 and L1 vertebral bodies most compatible with osseous metastatic disease. Mild compression deformity of L1, likely pathologic. Multilevel degenerative changes as described. She completed XRT 5 fractions on 10/1/24 -10/7/24. She has a history of fall on 9/2024. CT chest 12/12/24 showed no pleural effusion. She complains low back pain, taking Aleeve as needed with relief.  1/14/25: Manoj is here for follow up visit. Her spouse is with her. She has h/o bilateral invasive lobular carcinoma of the breast, ER/ME positive and HER2/deana negative, s/p bilateral mastectomy, bilateral sentinel lymph node biopsy, and right axillary lymph node dissection in 1/2013, s/p adjuvant chemotherapy and adjuvant chest wall radiotherapy, Completed 10-year of endocrine therapy with Letrozole in 7/2023. She developed recurrent disease with a lesion in T6 in 2/2024. CT guided biopsy on 4/17/24 showed metastatic carcinoma consistent from breast primary. She saw Dr. Anaya and received palliative RT 2000cGy to T6 lesion and completed RT on 5/22/24. She took Xeloda for 3 months and did not tolerate. Xeloda was discontinued. She had repeat PET/CT in 9/16/24 which showed interval resolution of uptake in T6 but new uptake in L1 and new pleural effusion.  She completed XRT 5 fractions on 10/1/24 -10/7/24. She has been taking Letrozole. She feels some aches and pains.

## 2025-01-18 NOTE — ASSESSMENT
[FreeTextEntry1] : Assessment and Plan: # H/O Bilateral invasive lobular carcinoma of the breast, ER/PA positive and HER2/deana negative, status post bilateral mastectomy, bilateral sentinel lymph node biopsy, and right axillary lymph node dissection, status post adjuvant chemotherapy and adjuvant chest wall radiotherapy, Completed 10-year of endocrine therapy with Letrozole in 7/2023.  - S/P b/l mastectomy. There is no palpable abnormality.  # Recurrent breast cancer(ER/PA/Her 2 negative Biopsy proven) with T6 vertebral body lesion S/P XRT(5/24) now presenting with new L1 lesion S/P XRT  9.24 - MRI spine on 3/11/24 showed a 2.7 cm lesion within the left aspect of the T6 vertebral body and 7 mm marrow replacing lesion within the T11 vertebral body.  - PET/CT on 3/28/24 showed Intense pathologic FDG uptake (SUV 9.3) coregistering with sclerotic lesion on the left side of T6 suspicious for biologic tumor activity. No other sites of abnormal FDG uptake.  - CT guided core bx of the lesion in T6 vertebral body on 4/17/24 showed metastatic carcinoma, favor breast primary, ER/PA negative. Her2 is negative.  - S/P palliative RT 2000cGy to T6 lesion and completed RT on 5/22/24. - She took Xeloda for 3 months, 1500 mg twice a day, one week on and one week off. Due to frequent diarrhea, dose reduced to Xeloda 1000 mg q12h, 1 week on and 1 week off.  She developed multiple side effects and Xeloda was discontinued.  -- PET/CT in 9/16/24 showed interval resolution of uptake in T6 and New uptake in L1 and new pleural effusion.  -- MRI of lumbar spine 9/25/24 showed infiltrative enhancing bone marrow signal abnormality within the T11 and L1 vertebral bodies most compatible with osseous metastatic disease. Mild compression deformity of L1, likely pathologic. -- She received XRT 5 fractions 2000cGy to Vertebrae, Lumbar on 10/1/24 - 10/7/24. -- Recurrent breast cancer in the spine is triple negative. She had palliative RT x 2. Currently, there is no evidence of visceral disease or other site disease. Since her initial breast cancer was hormone receptor and bone met biopsy could have inadequate IHC staining for hormone receptor, she has been taking Letrozole.  -- Repeat PET/CT on 1/7/25 showed several new foci of pathologic FDG uptake in bones, suspicious for biologic tumor activity. Interval resolution of previously noted small right pleural effusion. -- In light of progression of disease, she is recommended to start chemotherapy with Taxol 80 mg weekly, 3 weeks on and one week off. The side effects of chemo were reviewed. She agreed with the treatment and signed the consent form. Will refer her for port placement by IR.  -- Order blood work: CBC, BMP, LFT, Mg, CEA and .  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 She saw neurosurgeon.   #Low back pain. NSAIDs as needed. F/U with Neurosurgery as scheduled.  # H/O elevated serum calcium. MM panel showed no evidence of M-spike. Serum light chain ration is normal. Repeat serum calcium is normal and PTH is also normal.  # Osteoporosis/osteopenia. Encourage regular exercise. She has history of elevated Calcium levels. Advised to take Vitamin 1000 units daily.  RTO for follow up in 4 weeks.

## 2025-01-18 NOTE — PHYSICAL EXAM
[Fully active, able to carry on all pre-disease performance without restriction] : Status 0 - Fully active, able to carry on all pre-disease performance without restriction [Normal] : affect appropriate [de-identified] :  Status post bilateral mastectomy and autologous tissue reconstruction.  There is no skin lesion.  No palpable axillary lymphadenopathy. [de-identified] : Lower abdominal fat necrosis. Abdominal scar present.  [de-identified] : Low back pain 2' metastatic disease.

## 2025-01-27 NOTE — HISTORY OF PRESENT ILLNESS
[FreeTextEntry1] : Ms. ÁLVAREZ presents today for neurosurgical consultation.  She is a 73-year-old female who has a past medical history of bilateral invasive lobular carcinoma status post bilateral mastectomy with sentinel node biopsy and lymph node dissection in 2013.  She received adjuvant chemotherapy and radiation therapy.   In early 2024 she was found to have uptake at T6.  CT-guided biopsy confirmed metastatic disease.  This was treated with radiation therapy.  Surveillance PET scan in October showed resolution of uptake at T6, however she was also found to have new uptake in L1.  Subsequently this was treated with radiation therapy in October.  She remains on letrozole.  It should be noted that in September she did have a slip and fall.  MRI of the lumbar spine with and without contrast suggested signal abnormalities of T11 and L1, likely pathologic.  Mild compression deformity of L1. Current back pain is a 5/10.  Symptoms are bothersome.  No radiculopathy.  Today she presents with a recent PET/CT scan from 1/7/2025 which shows slightly increased compression deformity of L1.  Refer to full PET/CT report for additional details.  PHYSICAL EXAM:  Constitutional: Well appearing, no distress HEENT: Normocephalic Atraumatic Psychiatric: Alert and oriented to all spheres, normal mood Pulmonary: No respiratory distress Neurologic:  CN II-XII grossly intact Palpation: + lumbar tenderness.  Strength: Full strength in all major muscle groups Sensation: Full sensation to light touch in all extremities Reflexes:   2+ patellar  2+ ankle jerk Restriction in ROM  Signs: SLR negative Gait: steady, walking w/o assistance.

## 2025-01-27 NOTE — ASSESSMENT
[FreeTextEntry1] : Ms. ÁLVAREZ is a 73-year-old female who has a history of breast carcinoma with metastatic disease.  She sustained a fall in September and was found to have a compression deformity of L1, likely pathologic.  Recent PET/CT showed an increase in the compression deformity of L1.  Given these findings and continued back pain surgical intervention has been discussed.  This would consist of an L1 kyphoplasty with biopsy. Risk benefits alternatives discussed including but not limited to failure to improve, development of additional compression fractures, wound infections, chronic neuropathy/pain, nerve damage, anesthesia complications, and the potential need for further surgical intervention. She is agreeable and has requested to proceed. All questions answered today.    I personally reviewed with the PA, this patient's history and physical exam findings, as documented above. I have discussed the relevant areas of concern, having direct implications to the presenting problems and illnesses, and I have personally examined all pertinent and positive and negative findings, which impact their neurosurgical treatment.   MS KOJO RayaC Senior Physician Assistant [facility]    Rosalina Taylor MD FAANS Chair, Department of Neurosurgery [facility]

## 2025-02-20 NOTE — REVIEW OF SYSTEMS
[Joint Pain] : joint pain [Muscle Pain] : muscle pain [Negative] : Allergic/Immunologic [Diarrhea: Grade 1 - Increase of <4 stools per day over baseline; mild increase in ostomy output compared to baseline] : Diarrhea: Grade 1 - Increase of <4 stools per day over baseline; mild increase in ostomy output compared to baseline [FreeTextEntry7] : Intermittent diarrhea, managed with Imodium. [FreeTextEntry9] : Low back pain, managed with Aleeve.

## 2025-02-20 NOTE — ASSESSMENT
[FreeTextEntry1] : Assessment and Plan: # H/O Bilateral invasive lobular carcinoma of the breast, ER/WY positive and HER2/deana negative, status post bilateral mastectomy, bilateral sentinel lymph node biopsy, and right axillary lymph node dissection, status post adjuvant chemotherapy and adjuvant chest wall radiotherapy, Completed 10-year of endocrine therapy with Letrozole in 7/2023.  - S/P b/l mastectomy. There is no palpable abnormality.  # Recurrent breast cancer(ER/WY/Her 2 negative Biopsy proven) with T6 vertebral body lesion S/P XRT(5/24) now presenting with new L1 lesion S/P XRT  9.24 - MRI spine on 3/11/24 showed a 2.7 cm lesion within the left aspect of the T6 vertebral body and 7 mm marrow replacing lesion within the T11 vertebral body.  - PET/CT on 3/28/24 showed Intense pathologic FDG uptake (SUV 9.3) coregistering with sclerotic lesion on the left side of T6 suspicious for biologic tumor activity. No other sites of abnormal FDG uptake.  - CT guided core bx of the lesion in T6 vertebral body on 4/17/24 showed metastatic carcinoma, favor breast primary, ER/WY negative. Her2 is negative.  - S/P palliative RT 2000cGy to T6 lesion and completed RT on 5/22/24. - She took Xeloda for 3 months, 1500 mg twice a day, one week on and one week off. Due to frequent diarrhea, dose reduced to Xeloda 1000 mg q12h, 1 week on and 1 week off.  She developed multiple side effects and Xeloda was discontinued.  -- PET/CT in 9/16/24 showed interval resolution of uptake in T6 and New uptake in L1 and new pleural effusion.  -- MRI of lumbar spine 9/25/24 showed infiltrative enhancing bone marrow signal abnormality within the T11 and L1 vertebral bodies most compatible with osseous metastatic disease. Mild compression deformity of L1, likely pathologic. -- She received XRT 5 fractions 2000cGy to Vertebrae, Lumbar on 10/1/24 - 10/7/24. -- Recurrent breast cancer in the spine is triple negative. She had palliative RT x 2. Currently, there is no evidence of visceral disease or other site disease. Since her initial breast cancer was hormone receptor and bone met biopsy could have inadequate IHC staining for hormone receptor, she has been taking Letrozole.  -- Repeat PET/CT on 1/7/25 showed several new foci of pathologic FDG uptake in bones, suspicious for biologic tumor activity. Interval resolution of previously noted small right pleural effusion. -- In light of progression of disease, she started Taxol 80 mg weekly, 2 weeks on and one week off. CBC today reviewed and is adequate. Mild leucopenia with WBC 3.63 and normal ANC 2.28 K/uL noted. Will monitor. -- Order blood work: CBC, BMP, LFT, CEA and .  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 She saw neurosurgeon.   #Low back pain.  -- NSAIDs as needed.  -- F/U with Neurosurgery as scheduled.  # H/O elevated serum calcium. MM panel showed no evidence of M-spike. Serum light chain ration is normal. Repeat serum calcium is normal and PTH is also normal.  # Osteoporosis/osteopenia.  -- Prolia every 6 months. -- Encourage regular exercise as tolerated. -- She has history of elevated Calcium levels. Advised to take Vitamin 1000 units daily.  RTO for follow up in 3 weeks to see Dr Tovar..

## 2025-02-20 NOTE — HISTORY OF PRESENT ILLNESS
[de-identified] : This 68-year-old female is here today for a followup visit.  She has a history of bilateral invasive lobular carcinoma, stage IIIA (pT1c N2a M0) in the right breast, and stage IA (pT1b N0 M0) in the left breast.  Both sided breast cancer were ER/MA positive and HER2/deana negative.  She had bilateral mastectomy, bilateral sentinel lymph node biopsy, and right axillary lymph node dissection at [facility] on 01/08/2013.  She received adjuvant chemotherapy with Adriamycin and Cytoxan followed by paclitaxel.  She also received post mastectomy adjuvant radiotherapy to right side chest wall.  She has been on adjuvant endocrine therapy with Femara since 7/2013  and has been tolerating well.  She had genetic test for BRCA mutations.  She is negative for BRCA1/2 mutations.  In 10/2014, she had a bone density at [facility], which showed osteopenia. Her latest bone density was done 7/21/16 which showed osteopenia. MRI of breasts was done on 1/30/17 which showed no MR evidence of malignancy in either breast. Recommendation: Unless otherwise indicated by clinical findings, annual screening mammography recommended. This can be alternated at 6 month intervals with bilateral screening breast MRI.\par   She has been taking calcium and vitamin D supplements. Latest colonoscopy 11/2016 pt states it was negative. Saw Dr. Garcia 1/2017.\par  She saw Dr. Rankin recently. She is scheduled to have a revision of her lower abdominal fat necrosis on 1/2018.\par  \par   [de-identified] : 6/8/18: She stopped letrozole few months ago as she was found to be in Afib by her cardiologist on Pre-op clearance for abdominal fat necrosis. However her cardiologist () told her that you can resume Letrozole. She had surgery for abdominal fat necrosis and she is going for surgery for incisional hernia by .   12/13/18: The patient is here for followup visit. She has been taking Letrozole daily (since 7/2013) and tolerating well. She had hernia repair surgery. She complains some pain in her knees. She had MRI breast in 1 2017. There was no suspicious finding.  6/14/19; The patient is here for followup visit. She has been taking Letrozole daily (since 7/2013) and tolerating well. She complains some pain in her knees. She does not have other new complains. She has mild elevated calcium. PHT was normal.  12/13/2019 :  The patient is here for follow up. She has been on letrozole daily and tolerating well. She had a bone density 12/2/109 which showed osteopenia of femur and hip. She is not taking calcium and vit D since her last calcium was elevated 10.7. She had a recent US breasts 12/2019 for breast pain which was negative. She was seen by her cardiologist and had blood work in the summer. She was told that she has severe anemia. She started her on iron pills. She denies any vaginal bleeding or rectal bleeding  6/12/2020: The patient is here for follow up. She had bilateral invasive lobular carcinoma of the breast, ER/ID positive and HER2/deana negative, status post bilateral mastectomy, bilateral sentinel lymph node biopsy, and right axillary lymph node dissection in 1/2013, status post adjuvant chemotherapy and adjuvant chest wall radiotherapy, currently on adjuvant endocrine therapy with letrozole and tolerating well. She had a bone density 12/2/19 which showed osteopenia of femur and hip. She is not taking calcium and vit D since her last calcium was elevated 10.7. She had a recent US breasts 12/2019 for breast pain which was negative. She does not have new complains.  12/02/2020: MANOJ is here for follow up visit for bilateral invasive lobular carcinoma of the breast, ER/ID positive and HER2/deana negative, status post bilateral mastectomy, bilateral sentinel lymph node biopsy, and right axillary lymph node dissection in 1/2013, status post adjuvant chemotherapy and adjuvant chest wall radiotherapy, currently on adjuvant endocrine therapy with letrozole and tolerating well besides mild arthralgia. She had a bone density 12/2/19 which showed osteopenia of femur and hip. She continues on Vitamin D daily. She denies any new breast symptoms at this time. In addition, patient was diagnosed with COVID in 3/2020.   06/02/2021: MANOJ is here for follow up visit for bilateral invasive lobular carcinoma of the breast, ER/ID positive and HER2/deana negative, status post bilateral mastectomy, bilateral sentinel lymph node biopsy, and right axillary lymph node dissection in 1/2013, status post adjuvant chemotherapy and adjuvant chest wall radiotherapy, currently on adjuvant endocrine therapy with letrozole since 7/2013 and tolerating well besides mild arthralgia. She had a bone density 12/2/19 which showed osteopenia of femur and hip. She continues on Vitamin D daily. She denies any new breast symptoms at this time. she had right breast skin tag biopsied by dermatologist; which was benign.   1/5/22 MANOJ is here for follow up visit for bilateral invasive lobular carcinoma of the breast, ER/ID positive and HER2/deana negative, status post bilateral mastectomy, bilateral sentinel lymph node biopsy, and right axillary lymph node dissection in 1/2013, status post adjuvant chemotherapy and adjuvant chest wall radiotherapy, currently on adjuvant endocrine therapy with letrozole since 7/2013 and tolerating well besides mild arthralgia. She had a bone density 12/7/21 which showed worsening osteopenia of femur and hip, T score -2.4. She is not complaint with Vitamin D, does not take calcium due to mildly elevated Calcium. She denies any new breast symptoms at this time. She was experiencing lower abdominal and pelvic pain in Sept had US which was unremarkable. In addition, had Thyroid US due to hypercalcemia which was unremarkable, In 8/2021 TSH 2.36, Calcium 10, and PTH was 78.   7/21/22: Manoj is here for follow up visit for bilateral invasive lobular carcinoma of the breast, ER/ID positive and HER2/deana negative, status post bilateral mastectomy, bilateral sentinel lymph node biopsy, and right axillary lymph node dissection in 1/2013, status post adjuvant chemotherapy and adjuvant chest wall radiotherapy, currently on adjuvant endocrine therapy with letrozole since 7/2013 and tolerating well besides mild arthralgia. She had a bone density 12/7/21 which showed worsening osteopenia of femur and hip, T score -2.4. She takes Vitamin D but not calcium because her calcium has been mildly elevated. She was found to have large hiatal hernia and she has had on and off diarrhea for long time. She has been seen by Dr. Borjas.   01/26/2023 Manoj is here for follow up visit for bilateral invasive lobular carcinoma of the breast, ER/ID positive and HER2/deana negative, status post bilateral mastectomy, bilateral sentinel lymph node biopsy, and right axillary lymph node dissection in 1/2013, status post adjuvant chemotherapy and adjuvant chest wall radiotherapy, currently on adjuvant endocrine therapy with letrozole since 7/2013 and tolerating well besides mild arthralgia. She had a bone density 12/7/21 which showed worsening osteopenia of femur and hip, T score -2.4. She takes Vitamin D but not calcium because her calcium has been mildly elevated. She was found to have large hiatal hernia and surgery on 11/21/22. Her Hb drooped to 9.5 g/dl after surgery. She was found to have high serum calcium but repeat calcium was normal. PTH was also within normal limit.  7/20/23 Manoj is here for follow up visit for bilateral invasive lobular carcinoma of the breast, ER/ID positive and HER2/deana negative, status post bilateral mastectomy, bilateral sentinel lymph node biopsy, and right axillary lymph node dissection in 1/2013, status post adjuvant chemotherapy and adjuvant chest wall radiotherapy, currently on adjuvant endocrine therapy with letrozole since 7/2013 and tolerating well besides mild arthralgia. She had a bone density 12/7/21 which showed worsening osteopenia of femur and hip, T score -2.4. She takes Vitamin D but not calcium because her calcium has been mildly elevated. Repeat serum calcium normal, MM panel normal. She was found to have large hiatal hernia and surgery on 11/21/22. Her Hb drooped to 9.5 g/dl after surgery, now normal. After hernia repair she was having symptoms consistent with vagal nerve mediated gastroparesis, she then had endoscopic pyloromyotomy, symptoms resolved.   1/25/2024: Manoj is here for follow up visit for bilateral invasive lobular carcinoma of the breast, ER/ID positive and HER2/deana negative, status post bilateral mastectomy, bilateral sentinel lymph node biopsy, and right axillary lymph node dissection in 1/2013, status post adjuvant chemotherapy and adjuvant chest wall radiotherapy, currently on adjuvant endocrine therapy with letrozole since 7/2013 and tolerating well besides mild arthralgia. She Completed 10 years treatment of Letrozole in 7/2023. She had a bone density 12/8/23 Showed:         ----------------------------------------------------------------- AP Spine (L1-L4)         0.998   -0.4      1.8     Normal Femoral Neck (Left) 0.552   -2.7     -0.7     Osteoporosis Total Hip (Left) 0.676   -2.2     -0.6     Osteopenia -which showed Osteoporosis of femur Neck and Osteopenia in the Hip. She takes Vitamin D.  After hernia repair, she was having symptoms consistent with vagal nerve mediated gastroparesis, she then had endoscopic pyloromyotomy, symptoms resolved.  She starts feeling better now.   3/29/24: Manoj is here for follow up visit. She has h/o bilateral invasive lobular carcinoma of the breast, ER/ID positive and HER2/deana negative, status post bilateral mastectomy, bilateral sentinel lymph node biopsy, and right axillary lymph node dissection in 1/2013, status post adjuvant chemotherapy and adjuvant chest wall radiotherapy, currently on adjuvant endocrine therapy with letrozole since 7/2013. She had CT AP in 2/2024 for abdominal pain which incidentally showed a lesion in T6. She had MRI spine on 3/11/24 which showed a 2.7 cm lesion within the left aspect of the T6 vertebral body and 7 mm marrow replacing lesion within the T11 vertebral body.  PET/CT on 3/28/24 showed Intense pathologic FDG uptake (SUV 9.3) coregistering with sclerotic lesion on the left side of T6 suspicious for biologic tumor activity. No other sites of abnormal FDG uptake. She is here today to discuss further management plan.  4/29/24: Manoj is here for follow up visit. She has h/o bilateral invasive lobular carcinoma of the breast, ER/ID positive and HER2/deana negative, status post bilateral mastectomy, bilateral sentinel lymph node biopsy, and right axillary lymph node dissection in 1/2013, status post adjuvant chemotherapy and adjuvant chest wall radiotherapy, Completed 10-year of endocrine therapy with Letrozole in 7/2023. She had CT AP in 2/2024 for abdominal pain which incidentally showed a lesion in T6. She had MRI spine on 3/11/24 which showed a 2.7 cm lesion within the left aspect of the T6 vertebral body and 7 mm marrow replacing lesion within the T11 vertebral body.  PET/CT on 3/28/24 showed Intense pathologic FDG uptake (SUV 9.3) coregistering with sclerotic lesion on the left side of T6 suspicious for biologic tumor activity. No other sites of abnormal FDG uptake. She had CT guided biopsy on 4/17/24 and is here today to discuss the result.     5/30/24: Manoj is here for follow up visit. She has h/o bilateral invasive lobular carcinoma of the breast, ER/ID positive and HER2/deana negative, status post bilateral mastectomy, bilateral sentinel lymph node biopsy, and right axillary lymph node dissection in 1/2013, status post adjuvant chemotherapy and adjuvant chest wall radiotherapy, Completed 10-year of endocrine therapy with Letrozole in 7/2023. She had CT AP in 2/2024 for abdominal pain which incidentally showed a lesion in T6. She had MRI spine on 3/11/24 which showed a 2.7 cm lesion within the left aspect of the T6 vertebral body and 7 mm marrow replacing lesion within the T11 vertebral body.  PET/CT on 3/28/24 showed Intense pathologic FDG uptake (SUV 9.3) coregistering with sclerotic lesion on the left side of T6 suspicious for biologic tumor activity.  She had CT guided biopsy on 4/17/24 which showed metastatic carcinoma consistent from breast primary. She saw Dr. Anaya and received palliative RT 2000cGy to T6 lesion and completed RT on 5/22/24.  6/27/24: Manoj is here for follow up visit. She has h/o bilateral invasive lobular carcinoma of the breast, ER/ID positive and HER2/deana negative, status post bilateral mastectomy, bilateral sentinel lymph node biopsy, and right axillary lymph node dissection in 1/2013, status post adjuvant chemotherapy and adjuvant chest wall radiotherapy, Completed 10-year of endocrine therapy with Letrozole in 7/2023. She had CT AP in 2/2024 for abdominal pain which incidentally showed a lesion in T6. She had MRI spine on 3/11/24 which showed a 2.7 cm lesion within the left aspect of the T6 vertebral body and 7 mm marrow replacing lesion within the T11 vertebral body.  PET/CT on 3/28/24 showed Intense pathologic FDG uptake (SUV 9.3) coregistering with sclerotic lesion on the left side of T6 suspicious for biologic tumor activity.  She had CT guided biopsy on 4/17/24 which showed metastatic carcinoma consistent from breast primary. She saw Dr. Anaya and received palliative RT 2000cGy to T6 lesion and completed RT on 5/22/24. She started on Xeloda and had 1st week treatment. She had loose stool but otherwise tolerated well.   08/01/24: bilateral sentinel lymph node biopsy, and right axillary lymph node dissection in 1/2013, status post adjuvant chemotherapy and adjuvant chest wall radiotherapy, Completed 10-year of endocrine therapy with Letrozole in 7/2023. She had CT AP in 2/2024 for abdominal pain which incidentally showed a lesion in T6. She had MRI spine on 3/11/24 which showed a 2.7 cm lesion within the left aspect of the T6 vertebral body and 7 mm marrow replacing lesion within the T11 vertebral body.  PET/CT on 3/28/24 showed Intense pathologic FDG uptake (SUV 9.3) coregistering with sclerotic lesion on the left side of T6 suspicious for biologic tumor activity.  She had CT guided biopsy on 4/17/24 which showed metastatic carcinoma consistent from breast primary. She saw Dr. Anaya and received palliative RT 2000cGy to T6 lesion and completed RT on 5/22/24. She started on Xeloda and had 1st week of June 2024. She is complaining of persistence diarrhea despite Imodium intake, currently on Xeloda 3tabs every 12 hrs for one week on and one week off.   8/29/24: Manoj is here for follow up visit. She has h/o bilateral invasive lobular carcinoma of the breast, ER/ID positive and HER2/deana negative, status post bilateral mastectomy, bilateral sentinel lymph node biopsy, and right axillary lymph node dissection in 1/2013, status post adjuvant chemotherapy and adjuvant chest wall radiotherapy, Completed 10-year of endocrine therapy with Letrozole in 7/2023. She had CT AP in 2/2024 for abdominal pain which incidentally showed a lesion in T6. She had MRI spine on 3/11/24 which showed a 2.7 cm lesion within the left aspect of the T6 vertebral body and 7 mm marrow replacing lesion within the T11 vertebral body.  PET/CT on 3/28/24 showed Intense pathologic FDG uptake (SUV 9.3) coregistering with sclerotic lesion on the left side of T6 suspicious for biologic tumor activity.  She had CT guided biopsy on 4/17/24 which showed metastatic carcinoma consistent from breast primary. She saw Dr. Anaya and received palliative RT 2000cGy to T6 lesion and completed RT on 5/22/24. She has been on Xeloda and did not tolerate well. She complains diarrhea, weight loss and eye irritation.   10/10/24 Manoj is here for follow up visit. She has h/o bilateral invasive lobular carcinoma of the breast, ER/ID positive and HER2/deana negative, status post bilateral mastectomy, bilateral sentinel lymph node biopsy, and right axillary lymph node dissection in 1/2013, status post adjuvant chemotherapy and adjuvant chest wall radiotherapy, Completed 10-year of endocrine therapy with Letrozole in 7/2023. She had CT AP in 2/2024 for abdominal pain which incidentally showed a lesion in T6. She had MRI spine on 3/11/24 which showed a 2.7 cm lesion within the left aspect of the T6 vertebral body and 7 mm marrow replacing lesion within the T11 vertebral body.  PET/CT on 3/28/24 showed Intense pathologic FDG uptake (SUV 9.3) coregistering with sclerotic lesion on the left side of T6 suspicious for biologic tumor activity.  She had CT guided biopsy on 4/17/24 which showed metastatic carcinoma consistent from breast primary. She saw Dr. Anaya and received palliative RT 2000cGy to T6 lesion and completed RT on 5/22/24. She took Xeloda for 3 months and did not tolerate well. Xeloda was discontinued. She had a PET scan in 9.24 which showed interval resolution of uptake in T6 and New uptake in L1 and new pleural effussion. She completed XRT 5 fractions in 9.24.  She is here for follow up.  11/11/24 Manoj is here for follow up visit. She has h/o bilateral invasive lobular carcinoma of the breast, ER/ID positive and HER2/deana negative, status post bilateral mastectomy, bilateral sentinel lymph node biopsy, and right axillary lymph node dissection in 1/2013, status post adjuvant chemotherapy and adjuvant chest wall radiotherapy, Completed 10-year of endocrine therapy with Letrozole in 7/2023. She developed recurrent disease with a lesion in T6 in 2/2024. CT guided biopsy on 4/17/24 showed metastatic carcinoma consistent from breast primary. She saw Dr. Anaya and received palliative RT 2000cGy to T6 lesion and completed RT on 5/22/24. She took Xeloda for 3 months and did not tolerate well. Xeloda was discontinued. She had repeat PET/CT in 9.24 which showed interval resolution of uptake in T6 but new uptake in L1 and new pleural effusion. She completed XRT 5 fractions. She complains low back pain.   12/30/24 Manoj is here for follow up visit. Her spouse is with her. She has h/o bilateral invasive lobular carcinoma of the breast, ER/ID positive and HER2/deana negative, s/p bilateral mastectomy, bilateral sentinel lymph node biopsy, and right axillary lymph node dissection in 1/2013, s/p adjuvant chemotherapy and adjuvant chest wall radiotherapy, Completed 10-year of endocrine therapy with Letrozole in 7/2023. She developed recurrent disease with a lesion in T6 in 2/2024. CT guided biopsy on 4/17/24 showed metastatic carcinoma consistent from breast primary. She saw Dr. Anaya and received palliative RT 2000cGy to T6 lesion and completed RT on 5/22/24. She took Xeloda for 3 months and did not tolerate well. Xeloda was discontinued. She had repeat PET/CT in 9/16/24 which showed interval resolution of uptake in T6 but new uptake in L1 and new pleural effusion. MR Lumbar spine on 9/25/24 showed Infiltrative enhancing bone marrow signal abnormality within the T11 and L1 vertebral bodies most compatible with osseous metastatic disease. Mild compression deformity of L1, likely pathologic. Multilevel degenerative changes as described. She completed XRT 5 fractions on 10/1/24 -10/7/24. She has a history of fall on 9/2024. CT chest 12/12/24 showed no pleural effusion. She complains low back pain, taking Aleeve as needed with relief.  1/14/25: Manoj is here for follow up visit. Her spouse is with her. She has h/o bilateral invasive lobular carcinoma of the breast, ER/ID positive and HER2/deana negative, s/p bilateral mastectomy, bilateral sentinel lymph node biopsy, and right axillary lymph node dissection in 1/2013, s/p adjuvant chemotherapy and adjuvant chest wall radiotherapy, Completed 10-year of endocrine therapy with Letrozole in 7/2023. She developed recurrent disease with a lesion in T6 in 2/2024. CT guided biopsy on 4/17/24 showed metastatic carcinoma consistent from breast primary. She saw Dr. Anaya and received palliative RT 2000cGy to T6 lesion and completed RT on 5/22/24. She took Xeloda for 3 months and did not tolerate. Xeloda was discontinued. She had repeat PET/CT in 9/16/24 which showed interval resolution of uptake in T6 but new uptake in L1 and new pleural effusion.  She completed XRT 5 fractions on 10/1/24 -10/7/24. She has been taking Letrozole. She feels some aches and pains.   2/20/25 Manoj is here for follow up visit. Her spouse is with her. She has h/o bilateral invasive lobular carcinoma of the breast, ER/ID positive and HER2/deana negative, s/p bilateral mastectomy, bilateral sentinel lymph node biopsy, and right axillary lymph node dissection in 1/2013, s/p adjuvant chemotherapy and adjuvant chest wall radiotherapy, Completed 10-year of endocrine therapy with Letrozole in 7/2023. She developed recurrent disease with a lesion in T6 in 2/2024. CT guided biopsy on 4/17/24 showed metastatic carcinoma consistent from breast primary. She saw Dr. Anaya and received palliative RT 2000cGy to T6 lesion and completed RT on 5/22/24. She took Xeloda for 3 months and did not tolerate. Xeloda was discontinued. She had repeat PET/CT in 9/16/24 which showed interval resolution of uptake in T6 but new uptake in L1 and new pleural effusion. She completed XRT 5 fractions on 10/1/24 -10/7/24. She has been taking Letrozole. PET/CT on 1/7/25 showed several new foci of pathologic FDG uptake in bones, suspicious for biologic tumor activity. Interval resolution of previously noted small right pleural effusion. She feels some aches and pains. She started weekly Taxol, 2 weeks on and 1 week off on 1/31/25, s/p cycle #1 and is tolerating well except for diarrhea yesterday, managed with Imodium. She denies nausea, vomiting, or numbness or tingling of hands/feet. She saw Dr Taylor and is planned for L1 kyphoplasty on 3/18/25.

## 2025-02-20 NOTE — PHYSICAL EXAM
[Fully active, able to carry on all pre-disease performance without restriction] : Status 0 - Fully active, able to carry on all pre-disease performance without restriction [Normal] : affect appropriate [de-identified] :  Status post bilateral mastectomy and autologous tissue reconstruction. There is no skin lesion and no palpable axillary lymphadenopathy. [de-identified] : Lower abdominal fat necrosis. Abdominal scar present.  [de-identified] : Low back pain 2' metastatic disease.

## 2025-03-15 NOTE — HISTORY OF PRESENT ILLNESS
[de-identified] : This 68-year-old female is here today for a followup visit.  She has a history of bilateral invasive lobular carcinoma, stage IIIA (pT1c N2a M0) in the right breast, and stage IA (pT1b N0 M0) in the left breast.  Both sided breast cancer were ER/LA positive and HER2/deana negative.  She had bilateral mastectomy, bilateral sentinel lymph node biopsy, and right axillary lymph node dissection at [facility] on 01/08/2013.  She received adjuvant chemotherapy with Adriamycin and Cytoxan followed by paclitaxel.  She also received post mastectomy adjuvant radiotherapy to right side chest wall.  She has been on adjuvant endocrine therapy with Femara since 7/2013  and has been tolerating well.  She had genetic test for BRCA mutations.  She is negative for BRCA1/2 mutations.  In 10/2014, she had a bone density at [facility], which showed osteopenia. Her latest bone density was done 7/21/16 which showed osteopenia. MRI of breasts was done on 1/30/17 which showed no MR evidence of malignancy in either breast. Recommendation: Unless otherwise indicated by clinical findings, annual screening mammography recommended. This can be alternated at 6 month intervals with bilateral screening breast MRI.\par   She has been taking calcium and vitamin D supplements. Latest colonoscopy 11/2016 pt states it was negative. Saw Dr. aGrcia 1/2017.\par  She saw Dr. Rankin recently. She is scheduled to have a revision of her lower abdominal fat necrosis on 1/2018.\par  \par   [de-identified] : 6/8/18: She stopped letrozole few months ago as she was found to be in Afib by her cardiologist on Pre-op clearance for abdominal fat necrosis. However her cardiologist () told her that you can resume Letrozole. She had surgery for abdominal fat necrosis and she is going for surgery for incisional hernia by .   12/13/18: The patient is here for followup visit. She has been taking Letrozole daily (since 7/2013) and tolerating well. She had hernia repair surgery. She complains some pain in her knees. She had MRI breast in 1 2017. There was no suspicious finding.  6/14/19; The patient is here for followup visit. She has been taking Letrozole daily (since 7/2013) and tolerating well. She complains some pain in her knees. She does not have other new complains. She has mild elevated calcium. PHT was normal.  12/13/2019 :  The patient is here for follow up. She has been on letrozole daily and tolerating well. She had a bone density 12/2/109 which showed osteopenia of femur and hip. She is not taking calcium and vit D since her last calcium was elevated 10.7. She had a recent US breasts 12/2019 for breast pain which was negative. She was seen by her cardiologist and had blood work in the summer. She was told that she has severe anemia. She started her on iron pills. She denies any vaginal bleeding or rectal bleeding  6/12/2020: The patient is here for follow up. She had bilateral invasive lobular carcinoma of the breast, ER/MT positive and HER2/deana negative, status post bilateral mastectomy, bilateral sentinel lymph node biopsy, and right axillary lymph node dissection in 1/2013, status post adjuvant chemotherapy and adjuvant chest wall radiotherapy, currently on adjuvant endocrine therapy with letrozole and tolerating well. She had a bone density 12/2/19 which showed osteopenia of femur and hip. She is not taking calcium and vit D since her last calcium was elevated 10.7. She had a recent US breasts 12/2019 for breast pain which was negative. She does not have new complains.  12/02/2020: MANOJ is here for follow up visit for bilateral invasive lobular carcinoma of the breast, ER/MT positive and HER2/deana negative, status post bilateral mastectomy, bilateral sentinel lymph node biopsy, and right axillary lymph node dissection in 1/2013, status post adjuvant chemotherapy and adjuvant chest wall radiotherapy, currently on adjuvant endocrine therapy with letrozole and tolerating well besides mild arthralgia. She had a bone density 12/2/19 which showed osteopenia of femur and hip. She continues on Vitamin D daily. She denies any new breast symptoms at this time. In addition, patient was diagnosed with COVID in 3/2020.   06/02/2021: MANOJ is here for follow up visit for bilateral invasive lobular carcinoma of the breast, ER/MT positive and HER2/deana negative, status post bilateral mastectomy, bilateral sentinel lymph node biopsy, and right axillary lymph node dissection in 1/2013, status post adjuvant chemotherapy and adjuvant chest wall radiotherapy, currently on adjuvant endocrine therapy with letrozole since 7/2013 and tolerating well besides mild arthralgia. She had a bone density 12/2/19 which showed osteopenia of femur and hip. She continues on Vitamin D daily. She denies any new breast symptoms at this time. she had right breast skin tag biopsied by dermatologist; which was benign.   1/5/22 MANOJ is here for follow up visit for bilateral invasive lobular carcinoma of the breast, ER/MT positive and HER2/deana negative, status post bilateral mastectomy, bilateral sentinel lymph node biopsy, and right axillary lymph node dissection in 1/2013, status post adjuvant chemotherapy and adjuvant chest wall radiotherapy, currently on adjuvant endocrine therapy with letrozole since 7/2013 and tolerating well besides mild arthralgia. She had a bone density 12/7/21 which showed worsening osteopenia of femur and hip, T score -2.4. She is not complaint with Vitamin D, does not take calcium due to mildly elevated Calcium. She denies any new breast symptoms at this time. She was experiencing lower abdominal and pelvic pain in Sept had US which was unremarkable. In addition, had Thyroid US due to hypercalcemia which was unremarkable, In 8/2021 TSH 2.36, Calcium 10, and PTH was 78.   7/21/22: Manoj is here for follow up visit for bilateral invasive lobular carcinoma of the breast, ER/MT positive and HER2/deana negative, status post bilateral mastectomy, bilateral sentinel lymph node biopsy, and right axillary lymph node dissection in 1/2013, status post adjuvant chemotherapy and adjuvant chest wall radiotherapy, currently on adjuvant endocrine therapy with letrozole since 7/2013 and tolerating well besides mild arthralgia. She had a bone density 12/7/21 which showed worsening osteopenia of femur and hip, T score -2.4. She takes Vitamin D but not calcium because her calcium has been mildly elevated. She was found to have large hiatal hernia and she has had on and off diarrhea for long time. She has been seen by Dr. Borjas.   01/26/2023 Manoj is here for follow up visit for bilateral invasive lobular carcinoma of the breast, ER/MT positive and HER2/deana negative, status post bilateral mastectomy, bilateral sentinel lymph node biopsy, and right axillary lymph node dissection in 1/2013, status post adjuvant chemotherapy and adjuvant chest wall radiotherapy, currently on adjuvant endocrine therapy with letrozole since 7/2013 and tolerating well besides mild arthralgia. She had a bone density 12/7/21 which showed worsening osteopenia of femur and hip, T score -2.4. She takes Vitamin D but not calcium because her calcium has been mildly elevated. She was found to have large hiatal hernia and surgery on 11/21/22. Her Hb drooped to 9.5 g/dl after surgery. She was found to have high serum calcium but repeat calcium was normal. PTH was also within normal limit.  7/20/23 Manoj is here for follow up visit for bilateral invasive lobular carcinoma of the breast, ER/MT positive and HER2/deana negative, status post bilateral mastectomy, bilateral sentinel lymph node biopsy, and right axillary lymph node dissection in 1/2013, status post adjuvant chemotherapy and adjuvant chest wall radiotherapy, currently on adjuvant endocrine therapy with letrozole since 7/2013 and tolerating well besides mild arthralgia. She had a bone density 12/7/21 which showed worsening osteopenia of femur and hip, T score -2.4. She takes Vitamin D but not calcium because her calcium has been mildly elevated. Repeat serum calcium normal, MM panel normal. She was found to have large hiatal hernia and surgery on 11/21/22. Her Hb drooped to 9.5 g/dl after surgery, now normal. After hernia repair she was having symptoms consistent with vagal nerve mediated gastroparesis, she then had endoscopic pyloromyotomy, symptoms resolved.   1/25/2024: Manoj is here for follow up visit for bilateral invasive lobular carcinoma of the breast, ER/MT positive and HER2/deana negative, status post bilateral mastectomy, bilateral sentinel lymph node biopsy, and right axillary lymph node dissection in 1/2013, status post adjuvant chemotherapy and adjuvant chest wall radiotherapy, currently on adjuvant endocrine therapy with letrozole since 7/2013 and tolerating well besides mild arthralgia. She Completed 10 years treatment of Letrozole in 7/2023. She had a bone density 12/8/23 Showed:         ----------------------------------------------------------------- AP Spine (L1-L4)         0.998   -0.4      1.8     Normal Femoral Neck (Left) 0.552   -2.7     -0.7     Osteoporosis Total Hip (Left) 0.676   -2.2     -0.6     Osteopenia -which showed Osteoporosis of femur Neck and Osteopenia in the Hip. She takes Vitamin D.  After hernia repair, she was having symptoms consistent with vagal nerve mediated gastroparesis, she then had endoscopic pyloromyotomy, symptoms resolved.  She starts feeling better now.   3/29/24: Manoj is here for follow up visit. She has h/o bilateral invasive lobular carcinoma of the breast, ER/MT positive and HER2/deana negative, status post bilateral mastectomy, bilateral sentinel lymph node biopsy, and right axillary lymph node dissection in 1/2013, status post adjuvant chemotherapy and adjuvant chest wall radiotherapy, currently on adjuvant endocrine therapy with letrozole since 7/2013. She had CT AP in 2/2024 for abdominal pain which incidentally showed a lesion in T6. She had MRI spine on 3/11/24 which showed a 2.7 cm lesion within the left aspect of the T6 vertebral body and 7 mm marrow replacing lesion within the T11 vertebral body.  PET/CT on 3/28/24 showed Intense pathologic FDG uptake (SUV 9.3) coregistering with sclerotic lesion on the left side of T6 suspicious for biologic tumor activity. No other sites of abnormal FDG uptake. She is here today to discuss further management plan.  4/29/24: Manoj is here for follow up visit. She has h/o bilateral invasive lobular carcinoma of the breast, ER/MT positive and HER2/deana negative, status post bilateral mastectomy, bilateral sentinel lymph node biopsy, and right axillary lymph node dissection in 1/2013, status post adjuvant chemotherapy and adjuvant chest wall radiotherapy, Completed 10-year of endocrine therapy with Letrozole in 7/2023. She had CT AP in 2/2024 for abdominal pain which incidentally showed a lesion in T6. She had MRI spine on 3/11/24 which showed a 2.7 cm lesion within the left aspect of the T6 vertebral body and 7 mm marrow replacing lesion within the T11 vertebral body.  PET/CT on 3/28/24 showed Intense pathologic FDG uptake (SUV 9.3) coregistering with sclerotic lesion on the left side of T6 suspicious for biologic tumor activity. No other sites of abnormal FDG uptake. She had CT guided biopsy on 4/17/24 and is here today to discuss the result.     5/30/24: Manoj is here for follow up visit. She has h/o bilateral invasive lobular carcinoma of the breast, ER/MT positive and HER2/deana negative, status post bilateral mastectomy, bilateral sentinel lymph node biopsy, and right axillary lymph node dissection in 1/2013, status post adjuvant chemotherapy and adjuvant chest wall radiotherapy, Completed 10-year of endocrine therapy with Letrozole in 7/2023. She had CT AP in 2/2024 for abdominal pain which incidentally showed a lesion in T6. She had MRI spine on 3/11/24 which showed a 2.7 cm lesion within the left aspect of the T6 vertebral body and 7 mm marrow replacing lesion within the T11 vertebral body.  PET/CT on 3/28/24 showed Intense pathologic FDG uptake (SUV 9.3) coregistering with sclerotic lesion on the left side of T6 suspicious for biologic tumor activity.  She had CT guided biopsy on 4/17/24 which showed metastatic carcinoma consistent from breast primary. She saw Dr. Anaya and received palliative RT 2000cGy to T6 lesion and completed RT on 5/22/24.  6/27/24: Manoj is here for follow up visit. She has h/o bilateral invasive lobular carcinoma of the breast, ER/MT positive and HER2/deana negative, status post bilateral mastectomy, bilateral sentinel lymph node biopsy, and right axillary lymph node dissection in 1/2013, status post adjuvant chemotherapy and adjuvant chest wall radiotherapy, Completed 10-year of endocrine therapy with Letrozole in 7/2023. She had CT AP in 2/2024 for abdominal pain which incidentally showed a lesion in T6. She had MRI spine on 3/11/24 which showed a 2.7 cm lesion within the left aspect of the T6 vertebral body and 7 mm marrow replacing lesion within the T11 vertebral body.  PET/CT on 3/28/24 showed Intense pathologic FDG uptake (SUV 9.3) coregistering with sclerotic lesion on the left side of T6 suspicious for biologic tumor activity.  She had CT guided biopsy on 4/17/24 which showed metastatic carcinoma consistent from breast primary. She saw Dr. Anaya and received palliative RT 2000cGy to T6 lesion and completed RT on 5/22/24. She started on Xeloda and had 1st week treatment. She had loose stool but otherwise tolerated well.   08/01/24: bilateral sentinel lymph node biopsy, and right axillary lymph node dissection in 1/2013, status post adjuvant chemotherapy and adjuvant chest wall radiotherapy, Completed 10-year of endocrine therapy with Letrozole in 7/2023. She had CT AP in 2/2024 for abdominal pain which incidentally showed a lesion in T6. She had MRI spine on 3/11/24 which showed a 2.7 cm lesion within the left aspect of the T6 vertebral body and 7 mm marrow replacing lesion within the T11 vertebral body.  PET/CT on 3/28/24 showed Intense pathologic FDG uptake (SUV 9.3) coregistering with sclerotic lesion on the left side of T6 suspicious for biologic tumor activity.  She had CT guided biopsy on 4/17/24 which showed metastatic carcinoma consistent from breast primary. She saw Dr. Anaya and received palliative RT 2000cGy to T6 lesion and completed RT on 5/22/24. She started on Xeloda and had 1st week of June 2024. She is complaining of persistence diarrhea despite Imodium intake, currently on Xeloda 3tabs every 12 hrs for one week on and one week off.   8/29/24: Manoj is here for follow up visit. She has h/o bilateral invasive lobular carcinoma of the breast, ER/MT positive and HER2/deana negative, status post bilateral mastectomy, bilateral sentinel lymph node biopsy, and right axillary lymph node dissection in 1/2013, status post adjuvant chemotherapy and adjuvant chest wall radiotherapy, Completed 10-year of endocrine therapy with Letrozole in 7/2023. She had CT AP in 2/2024 for abdominal pain which incidentally showed a lesion in T6. She had MRI spine on 3/11/24 which showed a 2.7 cm lesion within the left aspect of the T6 vertebral body and 7 mm marrow replacing lesion within the T11 vertebral body.  PET/CT on 3/28/24 showed Intense pathologic FDG uptake (SUV 9.3) coregistering with sclerotic lesion on the left side of T6 suspicious for biologic tumor activity.  She had CT guided biopsy on 4/17/24 which showed metastatic carcinoma consistent from breast primary. She saw Dr. Anaya and received palliative RT 2000cGy to T6 lesion and completed RT on 5/22/24. She has been on Xeloda and did not tolerate well. She complains diarrhea, weight loss and eye irritation.   10/10/24 Manoj is here for follow up visit. She has h/o bilateral invasive lobular carcinoma of the breast, ER/MT positive and HER2/deana negative, status post bilateral mastectomy, bilateral sentinel lymph node biopsy, and right axillary lymph node dissection in 1/2013, status post adjuvant chemotherapy and adjuvant chest wall radiotherapy, Completed 10-year of endocrine therapy with Letrozole in 7/2023. She had CT AP in 2/2024 for abdominal pain which incidentally showed a lesion in T6. She had MRI spine on 3/11/24 which showed a 2.7 cm lesion within the left aspect of the T6 vertebral body and 7 mm marrow replacing lesion within the T11 vertebral body.  PET/CT on 3/28/24 showed Intense pathologic FDG uptake (SUV 9.3) coregistering with sclerotic lesion on the left side of T6 suspicious for biologic tumor activity.  She had CT guided biopsy on 4/17/24 which showed metastatic carcinoma consistent from breast primary. She saw Dr. Anaya and received palliative RT 2000cGy to T6 lesion and completed RT on 5/22/24. She took Xeloda for 3 months and did not tolerate well. Xeloda was discontinued. She had a PET scan in 9.24 which showed interval resolution of uptake in T6 and New uptake in L1 and new pleural effussion. She completed XRT 5 fractions in 9.24.  She is here for follow up.  11/11/24 Manoj is here for follow up visit. She has h/o bilateral invasive lobular carcinoma of the breast, ER/MT positive and HER2/deana negative, status post bilateral mastectomy, bilateral sentinel lymph node biopsy, and right axillary lymph node dissection in 1/2013, status post adjuvant chemotherapy and adjuvant chest wall radiotherapy, Completed 10-year of endocrine therapy with Letrozole in 7/2023. She developed recurrent disease with a lesion in T6 in 2/2024. CT guided biopsy on 4/17/24 showed metastatic carcinoma consistent from breast primary. She saw Dr. Anaya and received palliative RT 2000cGy to T6 lesion and completed RT on 5/22/24. She took Xeloda for 3 months and did not tolerate well. Xeloda was discontinued. She had repeat PET/CT in 9.24 which showed interval resolution of uptake in T6 but new uptake in L1 and new pleural effusion. She completed XRT 5 fractions. She complains low back pain.   12/30/24 Manoj is here for follow up visit. Her spouse is with her. She has h/o bilateral invasive lobular carcinoma of the breast, ER/MT positive and HER2/deana negative, s/p bilateral mastectomy, bilateral sentinel lymph node biopsy, and right axillary lymph node dissection in 1/2013, s/p adjuvant chemotherapy and adjuvant chest wall radiotherapy, Completed 10-year of endocrine therapy with Letrozole in 7/2023. She developed recurrent disease with a lesion in T6 in 2/2024. CT guided biopsy on 4/17/24 showed metastatic carcinoma consistent from breast primary. She saw Dr. Anaya and received palliative RT 2000cGy to T6 lesion and completed RT on 5/22/24. She took Xeloda for 3 months and did not tolerate well. Xeloda was discontinued. She had repeat PET/CT in 9/16/24 which showed interval resolution of uptake in T6 but new uptake in L1 and new pleural effusion. MR Lumbar spine on 9/25/24 showed Infiltrative enhancing bone marrow signal abnormality within the T11 and L1 vertebral bodies most compatible with osseous metastatic disease. Mild compression deformity of L1, likely pathologic. Multilevel degenerative changes as described. She completed XRT 5 fractions on 10/1/24 -10/7/24. She has a history of fall on 9/2024. CT chest 12/12/24 showed no pleural effusion. She complains low back pain, taking Aleeve as needed with relief.  1/14/25: Manoj is here for follow up visit. Her spouse is with her. She has h/o bilateral invasive lobular carcinoma of the breast, ER/MT positive and HER2/deana negative, s/p bilateral mastectomy, bilateral sentinel lymph node biopsy, and right axillary lymph node dissection in 1/2013, s/p adjuvant chemotherapy and adjuvant chest wall radiotherapy, Completed 10-year of endocrine therapy with Letrozole in 7/2023. She developed recurrent disease with a lesion in T6 in 2/2024. CT guided biopsy on 4/17/24 showed metastatic carcinoma consistent from breast primary. She saw Dr. Anaya and received palliative RT 2000cGy to T6 lesion and completed RT on 5/22/24. She took Xeloda for 3 months and did not tolerate. Xeloda was discontinued. She had repeat PET/CT in 9/16/24 which showed interval resolution of uptake in T6 but new uptake in L1 and new pleural effusion.  She completed XRT 5 fractions on 10/1/24 -10/7/24. She has been taking Letrozole. She feels some aches and pains.   2/20/25 Manoj is here for follow up visit. Her spouse is with her. She has h/o bilateral invasive lobular carcinoma of the breast, ER/MT positive and HER2/deana negative, s/p bilateral mastectomy, bilateral sentinel lymph node biopsy, and right axillary lymph node dissection in 1/2013, s/p adjuvant chemotherapy and adjuvant chest wall radiotherapy, Completed 10-year of endocrine therapy with Letrozole in 7/2023. She developed recurrent disease with a lesion in T6 in 2/2024. CT guided biopsy on 4/17/24 showed metastatic carcinoma consistent from breast primary. She saw Dr. Anaya and received palliative RT 2000cGy to T6 lesion and completed RT on 5/22/24. She took Xeloda for 3 months and did not tolerate. Xeloda was discontinued. She had repeat PET/CT in 9/16/24 which showed interval resolution of uptake in T6 but new uptake in L1 and new pleural effusion. She completed XRT 5 fractions on 10/1/24 -10/7/24. She has been taking Letrozole. PET/CT on 1/7/25 showed several new foci of pathologic FDG uptake in bones, suspicious for biologic tumor activity. Interval resolution of previously noted small right pleural effusion. She feels some aches and pains. She started weekly Taxol, 2 weeks on and 1 week off on 1/31/25, s/p cycle #1 and is tolerating well except for diarrhea yesterday, managed with Imodium. She denies nausea, vomiting, or numbness or tingling of hands/feet. She saw Dr Taylor and is planned for L1 kyphoplasty on 3/18/25.  3/13/25 Manoj is here for follow up visit. Her spouse is with her. She has h/o bilateral invasive lobular carcinoma of the breast, ER/MT positive and HER2/deana negative, s/p bilateral mastectomy, bilateral sentinel lymph node biopsy, and right axillary lymph node dissection in 1/2013, s/p adjuvant chemotherapy and adjuvant chest wall radiotherapy, Completed 10-year of endocrine therapy with Letrozole in 7/2023. She developed recurrent disease with a lesion in T6 in 2/2024. CT guided biopsy on 4/17/24 showed metastatic carcinoma consistent from breast primary. She saw Dr. Anaya and received palliative RT 2000cGy to T6 lesion and completed RT on 5/22/24. She took Xeloda for 3 months and did not tolerate. Xeloda was discontinued. She had repeat PET/CT in 9/16/24 which showed interval resolution of uptake in T6 but new uptake in L1 and new pleural effusion. She completed XRT 5 fractions on 10/1/24 -10/7/24. She has been taking Letrozole. PET/CT on 1/7/25 showed several new foci of pathologic FDG uptake in bones, suspicious for biologic tumor activity. She started weekly Taxol, 2 weeks on and 1 week off on 1/31/25, s/p 2 cycles and tolerated.

## 2025-03-15 NOTE — PHYSICAL EXAM
[Fully active, able to carry on all pre-disease performance without restriction] : Status 0 - Fully active, able to carry on all pre-disease performance without restriction [Normal] : affect appropriate [de-identified] :  Status post bilateral mastectomy and autologous tissue reconstruction. There is no skin lesion and no palpable axillary lymphadenopathy. [de-identified] : Lower abdominal fat necrosis. Abdominal scar present.  [de-identified] : Low back pain 2' metastatic disease.

## 2025-03-15 NOTE — HISTORY OF PRESENT ILLNESS
[de-identified] : This 68-year-old female is here today for a followup visit.  She has a history of bilateral invasive lobular carcinoma, stage IIIA (pT1c N2a M0) in the right breast, and stage IA (pT1b N0 M0) in the left breast.  Both sided breast cancer were ER/WI positive and HER2/deana negative.  She had bilateral mastectomy, bilateral sentinel lymph node biopsy, and right axillary lymph node dissection at [facility] on 01/08/2013.  She received adjuvant chemotherapy with Adriamycin and Cytoxan followed by paclitaxel.  She also received post mastectomy adjuvant radiotherapy to right side chest wall.  She has been on adjuvant endocrine therapy with Femara since 7/2013  and has been tolerating well.  She had genetic test for BRCA mutations.  She is negative for BRCA1/2 mutations.  In 10/2014, she had a bone density at [facility], which showed osteopenia. Her latest bone density was done 7/21/16 which showed osteopenia. MRI of breasts was done on 1/30/17 which showed no MR evidence of malignancy in either breast. Recommendation: Unless otherwise indicated by clinical findings, annual screening mammography recommended. This can be alternated at 6 month intervals with bilateral screening breast MRI.\par   She has been taking calcium and vitamin D supplements. Latest colonoscopy 11/2016 pt states it was negative. Saw Dr. Garcia 1/2017.\par  She saw Dr. Rankin recently. She is scheduled to have a revision of her lower abdominal fat necrosis on 1/2018.\par  \par   [de-identified] : 6/8/18: She stopped letrozole few months ago as she was found to be in Afib by her cardiologist on Pre-op clearance for abdominal fat necrosis. However her cardiologist () told her that you can resume Letrozole. She had surgery for abdominal fat necrosis and she is going for surgery for incisional hernia by .   12/13/18: The patient is here for followup visit. She has been taking Letrozole daily (since 7/2013) and tolerating well. She had hernia repair surgery. She complains some pain in her knees. She had MRI breast in 1 2017. There was no suspicious finding.  6/14/19; The patient is here for followup visit. She has been taking Letrozole daily (since 7/2013) and tolerating well. She complains some pain in her knees. She does not have other new complains. She has mild elevated calcium. PHT was normal.  12/13/2019 :  The patient is here for follow up. She has been on letrozole daily and tolerating well. She had a bone density 12/2/109 which showed osteopenia of femur and hip. She is not taking calcium and vit D since her last calcium was elevated 10.7. She had a recent US breasts 12/2019 for breast pain which was negative. She was seen by her cardiologist and had blood work in the summer. She was told that she has severe anemia. She started her on iron pills. She denies any vaginal bleeding or rectal bleeding  6/12/2020: The patient is here for follow up. She had bilateral invasive lobular carcinoma of the breast, ER/OH positive and HER2/deana negative, status post bilateral mastectomy, bilateral sentinel lymph node biopsy, and right axillary lymph node dissection in 1/2013, status post adjuvant chemotherapy and adjuvant chest wall radiotherapy, currently on adjuvant endocrine therapy with letrozole and tolerating well. She had a bone density 12/2/19 which showed osteopenia of femur and hip. She is not taking calcium and vit D since her last calcium was elevated 10.7. She had a recent US breasts 12/2019 for breast pain which was negative. She does not have new complains.  12/02/2020: MANOJ is here for follow up visit for bilateral invasive lobular carcinoma of the breast, ER/OH positive and HER2/deana negative, status post bilateral mastectomy, bilateral sentinel lymph node biopsy, and right axillary lymph node dissection in 1/2013, status post adjuvant chemotherapy and adjuvant chest wall radiotherapy, currently on adjuvant endocrine therapy with letrozole and tolerating well besides mild arthralgia. She had a bone density 12/2/19 which showed osteopenia of femur and hip. She continues on Vitamin D daily. She denies any new breast symptoms at this time. In addition, patient was diagnosed with COVID in 3/2020.   06/02/2021: MANOJ is here for follow up visit for bilateral invasive lobular carcinoma of the breast, ER/OH positive and HER2/deana negative, status post bilateral mastectomy, bilateral sentinel lymph node biopsy, and right axillary lymph node dissection in 1/2013, status post adjuvant chemotherapy and adjuvant chest wall radiotherapy, currently on adjuvant endocrine therapy with letrozole since 7/2013 and tolerating well besides mild arthralgia. She had a bone density 12/2/19 which showed osteopenia of femur and hip. She continues on Vitamin D daily. She denies any new breast symptoms at this time. she had right breast skin tag biopsied by dermatologist; which was benign.   1/5/22 MANOJ is here for follow up visit for bilateral invasive lobular carcinoma of the breast, ER/OH positive and HER2/deana negative, status post bilateral mastectomy, bilateral sentinel lymph node biopsy, and right axillary lymph node dissection in 1/2013, status post adjuvant chemotherapy and adjuvant chest wall radiotherapy, currently on adjuvant endocrine therapy with letrozole since 7/2013 and tolerating well besides mild arthralgia. She had a bone density 12/7/21 which showed worsening osteopenia of femur and hip, T score -2.4. She is not complaint with Vitamin D, does not take calcium due to mildly elevated Calcium. She denies any new breast symptoms at this time. She was experiencing lower abdominal and pelvic pain in Sept had US which was unremarkable. In addition, had Thyroid US due to hypercalcemia which was unremarkable, In 8/2021 TSH 2.36, Calcium 10, and PTH was 78.   7/21/22: Manoj is here for follow up visit for bilateral invasive lobular carcinoma of the breast, ER/OH positive and HER2/deana negative, status post bilateral mastectomy, bilateral sentinel lymph node biopsy, and right axillary lymph node dissection in 1/2013, status post adjuvant chemotherapy and adjuvant chest wall radiotherapy, currently on adjuvant endocrine therapy with letrozole since 7/2013 and tolerating well besides mild arthralgia. She had a bone density 12/7/21 which showed worsening osteopenia of femur and hip, T score -2.4. She takes Vitamin D but not calcium because her calcium has been mildly elevated. She was found to have large hiatal hernia and she has had on and off diarrhea for long time. She has been seen by Dr. Borjas.   01/26/2023 Manoj is here for follow up visit for bilateral invasive lobular carcinoma of the breast, ER/OH positive and HER2/deana negative, status post bilateral mastectomy, bilateral sentinel lymph node biopsy, and right axillary lymph node dissection in 1/2013, status post adjuvant chemotherapy and adjuvant chest wall radiotherapy, currently on adjuvant endocrine therapy with letrozole since 7/2013 and tolerating well besides mild arthralgia. She had a bone density 12/7/21 which showed worsening osteopenia of femur and hip, T score -2.4. She takes Vitamin D but not calcium because her calcium has been mildly elevated. She was found to have large hiatal hernia and surgery on 11/21/22. Her Hb drooped to 9.5 g/dl after surgery. She was found to have high serum calcium but repeat calcium was normal. PTH was also within normal limit.  7/20/23 Manoj is here for follow up visit for bilateral invasive lobular carcinoma of the breast, ER/OH positive and HER2/deana negative, status post bilateral mastectomy, bilateral sentinel lymph node biopsy, and right axillary lymph node dissection in 1/2013, status post adjuvant chemotherapy and adjuvant chest wall radiotherapy, currently on adjuvant endocrine therapy with letrozole since 7/2013 and tolerating well besides mild arthralgia. She had a bone density 12/7/21 which showed worsening osteopenia of femur and hip, T score -2.4. She takes Vitamin D but not calcium because her calcium has been mildly elevated. Repeat serum calcium normal, MM panel normal. She was found to have large hiatal hernia and surgery on 11/21/22. Her Hb drooped to 9.5 g/dl after surgery, now normal. After hernia repair she was having symptoms consistent with vagal nerve mediated gastroparesis, she then had endoscopic pyloromyotomy, symptoms resolved.   1/25/2024: Manoj is here for follow up visit for bilateral invasive lobular carcinoma of the breast, ER/OH positive and HER2/deana negative, status post bilateral mastectomy, bilateral sentinel lymph node biopsy, and right axillary lymph node dissection in 1/2013, status post adjuvant chemotherapy and adjuvant chest wall radiotherapy, currently on adjuvant endocrine therapy with letrozole since 7/2013 and tolerating well besides mild arthralgia. She Completed 10 years treatment of Letrozole in 7/2023. She had a bone density 12/8/23 Showed:         ----------------------------------------------------------------- AP Spine (L1-L4)         0.998   -0.4      1.8     Normal Femoral Neck (Left) 0.552   -2.7     -0.7     Osteoporosis Total Hip (Left) 0.676   -2.2     -0.6     Osteopenia -which showed Osteoporosis of femur Neck and Osteopenia in the Hip. She takes Vitamin D.  After hernia repair, she was having symptoms consistent with vagal nerve mediated gastroparesis, she then had endoscopic pyloromyotomy, symptoms resolved.  She starts feeling better now.   3/29/24: Manoj is here for follow up visit. She has h/o bilateral invasive lobular carcinoma of the breast, ER/OH positive and HER2/deana negative, status post bilateral mastectomy, bilateral sentinel lymph node biopsy, and right axillary lymph node dissection in 1/2013, status post adjuvant chemotherapy and adjuvant chest wall radiotherapy, currently on adjuvant endocrine therapy with letrozole since 7/2013. She had CT AP in 2/2024 for abdominal pain which incidentally showed a lesion in T6. She had MRI spine on 3/11/24 which showed a 2.7 cm lesion within the left aspect of the T6 vertebral body and 7 mm marrow replacing lesion within the T11 vertebral body.  PET/CT on 3/28/24 showed Intense pathologic FDG uptake (SUV 9.3) coregistering with sclerotic lesion on the left side of T6 suspicious for biologic tumor activity. No other sites of abnormal FDG uptake. She is here today to discuss further management plan.  4/29/24: Manoj is here for follow up visit. She has h/o bilateral invasive lobular carcinoma of the breast, ER/OH positive and HER2/deana negative, status post bilateral mastectomy, bilateral sentinel lymph node biopsy, and right axillary lymph node dissection in 1/2013, status post adjuvant chemotherapy and adjuvant chest wall radiotherapy, Completed 10-year of endocrine therapy with Letrozole in 7/2023. She had CT AP in 2/2024 for abdominal pain which incidentally showed a lesion in T6. She had MRI spine on 3/11/24 which showed a 2.7 cm lesion within the left aspect of the T6 vertebral body and 7 mm marrow replacing lesion within the T11 vertebral body.  PET/CT on 3/28/24 showed Intense pathologic FDG uptake (SUV 9.3) coregistering with sclerotic lesion on the left side of T6 suspicious for biologic tumor activity. No other sites of abnormal FDG uptake. She had CT guided biopsy on 4/17/24 and is here today to discuss the result.     5/30/24: Manoj is here for follow up visit. She has h/o bilateral invasive lobular carcinoma of the breast, ER/OH positive and HER2/deana negative, status post bilateral mastectomy, bilateral sentinel lymph node biopsy, and right axillary lymph node dissection in 1/2013, status post adjuvant chemotherapy and adjuvant chest wall radiotherapy, Completed 10-year of endocrine therapy with Letrozole in 7/2023. She had CT AP in 2/2024 for abdominal pain which incidentally showed a lesion in T6. She had MRI spine on 3/11/24 which showed a 2.7 cm lesion within the left aspect of the T6 vertebral body and 7 mm marrow replacing lesion within the T11 vertebral body.  PET/CT on 3/28/24 showed Intense pathologic FDG uptake (SUV 9.3) coregistering with sclerotic lesion on the left side of T6 suspicious for biologic tumor activity.  She had CT guided biopsy on 4/17/24 which showed metastatic carcinoma consistent from breast primary. She saw Dr. Anaay and received palliative RT 2000cGy to T6 lesion and completed RT on 5/22/24.  6/27/24: Manoj is here for follow up visit. She has h/o bilateral invasive lobular carcinoma of the breast, ER/OH positive and HER2/deana negative, status post bilateral mastectomy, bilateral sentinel lymph node biopsy, and right axillary lymph node dissection in 1/2013, status post adjuvant chemotherapy and adjuvant chest wall radiotherapy, Completed 10-year of endocrine therapy with Letrozole in 7/2023. She had CT AP in 2/2024 for abdominal pain which incidentally showed a lesion in T6. She had MRI spine on 3/11/24 which showed a 2.7 cm lesion within the left aspect of the T6 vertebral body and 7 mm marrow replacing lesion within the T11 vertebral body.  PET/CT on 3/28/24 showed Intense pathologic FDG uptake (SUV 9.3) coregistering with sclerotic lesion on the left side of T6 suspicious for biologic tumor activity.  She had CT guided biopsy on 4/17/24 which showed metastatic carcinoma consistent from breast primary. She saw Dr. Anaya and received palliative RT 2000cGy to T6 lesion and completed RT on 5/22/24. She started on Xeloda and had 1st week treatment. She had loose stool but otherwise tolerated well.   08/01/24: bilateral sentinel lymph node biopsy, and right axillary lymph node dissection in 1/2013, status post adjuvant chemotherapy and adjuvant chest wall radiotherapy, Completed 10-year of endocrine therapy with Letrozole in 7/2023. She had CT AP in 2/2024 for abdominal pain which incidentally showed a lesion in T6. She had MRI spine on 3/11/24 which showed a 2.7 cm lesion within the left aspect of the T6 vertebral body and 7 mm marrow replacing lesion within the T11 vertebral body.  PET/CT on 3/28/24 showed Intense pathologic FDG uptake (SUV 9.3) coregistering with sclerotic lesion on the left side of T6 suspicious for biologic tumor activity.  She had CT guided biopsy on 4/17/24 which showed metastatic carcinoma consistent from breast primary. She saw Dr. Anaya and received palliative RT 2000cGy to T6 lesion and completed RT on 5/22/24. She started on Xeloda and had 1st week of June 2024. She is complaining of persistence diarrhea despite Imodium intake, currently on Xeloda 3tabs every 12 hrs for one week on and one week off.   8/29/24: Manoj is here for follow up visit. She has h/o bilateral invasive lobular carcinoma of the breast, ER/OH positive and HER2/deana negative, status post bilateral mastectomy, bilateral sentinel lymph node biopsy, and right axillary lymph node dissection in 1/2013, status post adjuvant chemotherapy and adjuvant chest wall radiotherapy, Completed 10-year of endocrine therapy with Letrozole in 7/2023. She had CT AP in 2/2024 for abdominal pain which incidentally showed a lesion in T6. She had MRI spine on 3/11/24 which showed a 2.7 cm lesion within the left aspect of the T6 vertebral body and 7 mm marrow replacing lesion within the T11 vertebral body.  PET/CT on 3/28/24 showed Intense pathologic FDG uptake (SUV 9.3) coregistering with sclerotic lesion on the left side of T6 suspicious for biologic tumor activity.  She had CT guided biopsy on 4/17/24 which showed metastatic carcinoma consistent from breast primary. She saw Dr. Anaya and received palliative RT 2000cGy to T6 lesion and completed RT on 5/22/24. She has been on Xeloda and did not tolerate well. She complains diarrhea, weight loss and eye irritation.   10/10/24 Manoj is here for follow up visit. She has h/o bilateral invasive lobular carcinoma of the breast, ER/OH positive and HER2/deana negative, status post bilateral mastectomy, bilateral sentinel lymph node biopsy, and right axillary lymph node dissection in 1/2013, status post adjuvant chemotherapy and adjuvant chest wall radiotherapy, Completed 10-year of endocrine therapy with Letrozole in 7/2023. She had CT AP in 2/2024 for abdominal pain which incidentally showed a lesion in T6. She had MRI spine on 3/11/24 which showed a 2.7 cm lesion within the left aspect of the T6 vertebral body and 7 mm marrow replacing lesion within the T11 vertebral body.  PET/CT on 3/28/24 showed Intense pathologic FDG uptake (SUV 9.3) coregistering with sclerotic lesion on the left side of T6 suspicious for biologic tumor activity.  She had CT guided biopsy on 4/17/24 which showed metastatic carcinoma consistent from breast primary. She saw Dr. Anaya and received palliative RT 2000cGy to T6 lesion and completed RT on 5/22/24. She took Xeloda for 3 months and did not tolerate well. Xeloda was discontinued. She had a PET scan in 9.24 which showed interval resolution of uptake in T6 and New uptake in L1 and new pleural effussion. She completed XRT 5 fractions in 9.24.  She is here for follow up.  11/11/24 Manoj is here for follow up visit. She has h/o bilateral invasive lobular carcinoma of the breast, ER/OH positive and HER2/deana negative, status post bilateral mastectomy, bilateral sentinel lymph node biopsy, and right axillary lymph node dissection in 1/2013, status post adjuvant chemotherapy and adjuvant chest wall radiotherapy, Completed 10-year of endocrine therapy with Letrozole in 7/2023. She developed recurrent disease with a lesion in T6 in 2/2024. CT guided biopsy on 4/17/24 showed metastatic carcinoma consistent from breast primary. She saw Dr. Anaya and received palliative RT 2000cGy to T6 lesion and completed RT on 5/22/24. She took Xeloda for 3 months and did not tolerate well. Xeloda was discontinued. She had repeat PET/CT in 9.24 which showed interval resolution of uptake in T6 but new uptake in L1 and new pleural effusion. She completed XRT 5 fractions. She complains low back pain.   12/30/24 Manoj is here for follow up visit. Her spouse is with her. She has h/o bilateral invasive lobular carcinoma of the breast, ER/OH positive and HER2/deana negative, s/p bilateral mastectomy, bilateral sentinel lymph node biopsy, and right axillary lymph node dissection in 1/2013, s/p adjuvant chemotherapy and adjuvant chest wall radiotherapy, Completed 10-year of endocrine therapy with Letrozole in 7/2023. She developed recurrent disease with a lesion in T6 in 2/2024. CT guided biopsy on 4/17/24 showed metastatic carcinoma consistent from breast primary. She saw Dr. Anaya and received palliative RT 2000cGy to T6 lesion and completed RT on 5/22/24. She took Xeloda for 3 months and did not tolerate well. Xeloda was discontinued. She had repeat PET/CT in 9/16/24 which showed interval resolution of uptake in T6 but new uptake in L1 and new pleural effusion. MR Lumbar spine on 9/25/24 showed Infiltrative enhancing bone marrow signal abnormality within the T11 and L1 vertebral bodies most compatible with osseous metastatic disease. Mild compression deformity of L1, likely pathologic. Multilevel degenerative changes as described. She completed XRT 5 fractions on 10/1/24 -10/7/24. She has a history of fall on 9/2024. CT chest 12/12/24 showed no pleural effusion. She complains low back pain, taking Aleeve as needed with relief.  1/14/25: Manoj is here for follow up visit. Her spouse is with her. She has h/o bilateral invasive lobular carcinoma of the breast, ER/OH positive and HER2/deana negative, s/p bilateral mastectomy, bilateral sentinel lymph node biopsy, and right axillary lymph node dissection in 1/2013, s/p adjuvant chemotherapy and adjuvant chest wall radiotherapy, Completed 10-year of endocrine therapy with Letrozole in 7/2023. She developed recurrent disease with a lesion in T6 in 2/2024. CT guided biopsy on 4/17/24 showed metastatic carcinoma consistent from breast primary. She saw Dr. Anaya and received palliative RT 2000cGy to T6 lesion and completed RT on 5/22/24. She took Xeloda for 3 months and did not tolerate. Xeloda was discontinued. She had repeat PET/CT in 9/16/24 which showed interval resolution of uptake in T6 but new uptake in L1 and new pleural effusion.  She completed XRT 5 fractions on 10/1/24 -10/7/24. She has been taking Letrozole. She feels some aches and pains.   2/20/25 Manoj is here for follow up visit. Her spouse is with her. She has h/o bilateral invasive lobular carcinoma of the breast, ER/OH positive and HER2/deana negative, s/p bilateral mastectomy, bilateral sentinel lymph node biopsy, and right axillary lymph node dissection in 1/2013, s/p adjuvant chemotherapy and adjuvant chest wall radiotherapy, Completed 10-year of endocrine therapy with Letrozole in 7/2023. She developed recurrent disease with a lesion in T6 in 2/2024. CT guided biopsy on 4/17/24 showed metastatic carcinoma consistent from breast primary. She saw Dr. Anaya and received palliative RT 2000cGy to T6 lesion and completed RT on 5/22/24. She took Xeloda for 3 months and did not tolerate. Xeloda was discontinued. She had repeat PET/CT in 9/16/24 which showed interval resolution of uptake in T6 but new uptake in L1 and new pleural effusion. She completed XRT 5 fractions on 10/1/24 -10/7/24. She has been taking Letrozole. PET/CT on 1/7/25 showed several new foci of pathologic FDG uptake in bones, suspicious for biologic tumor activity. Interval resolution of previously noted small right pleural effusion. She feels some aches and pains. She started weekly Taxol, 2 weeks on and 1 week off on 1/31/25, s/p cycle #1 and is tolerating well except for diarrhea yesterday, managed with Imodium. She denies nausea, vomiting, or numbness or tingling of hands/feet. She saw Dr Taylor and is planned for L1 kyphoplasty on 3/18/25.  3/13/25 Manoj is here for follow up visit. Her spouse is with her. She has h/o bilateral invasive lobular carcinoma of the breast, ER/OH positive and HER2/deana negative, s/p bilateral mastectomy, bilateral sentinel lymph node biopsy, and right axillary lymph node dissection in 1/2013, s/p adjuvant chemotherapy and adjuvant chest wall radiotherapy, Completed 10-year of endocrine therapy with Letrozole in 7/2023. She developed recurrent disease with a lesion in T6 in 2/2024. CT guided biopsy on 4/17/24 showed metastatic carcinoma consistent from breast primary. She saw Dr. Anaya and received palliative RT 2000cGy to T6 lesion and completed RT on 5/22/24. She took Xeloda for 3 months and did not tolerate. Xeloda was discontinued. She had repeat PET/CT in 9/16/24 which showed interval resolution of uptake in T6 but new uptake in L1 and new pleural effusion. She completed XRT 5 fractions on 10/1/24 -10/7/24. She has been taking Letrozole. PET/CT on 1/7/25 showed several new foci of pathologic FDG uptake in bones, suspicious for biologic tumor activity. She started weekly Taxol, 2 weeks on and 1 week off on 1/31/25, s/p 2 cycles and tolerated.

## 2025-03-15 NOTE — ASSESSMENT
[FreeTextEntry1] : Assessment and Plan: # H/O Bilateral invasive lobular carcinoma of the breast, ER/CT positive and HER2/deana negative, status post bilateral mastectomy, bilateral sentinel lymph node biopsy, and right axillary lymph node dissection, status post adjuvant chemotherapy and adjuvant chest wall radiotherapy, Completed 10-year of endocrine therapy with Letrozole in 7/2023.  - S/P b/l mastectomy. There is no palpable abnormality.  # Recurrent breast cancer(ER/CT/Her 2 negative Biopsy proven) with T6 vertebral body lesion S/P XRT(5/24) now presenting with new L1 lesion S/P XRT  9.24 - MRI spine on 3/11/24 showed a 2.7 cm lesion within the left aspect of the T6 vertebral body and 7 mm marrow replacing lesion within the T11 vertebral body.  - PET/CT on 3/28/24 showed Intense pathologic FDG uptake (SUV 9.3) coregistering with sclerotic lesion on the left side of T6 suspicious for biologic tumor activity. No other sites of abnormal FDG uptake.  - CT guided core bx of the lesion in T6 vertebral body on 4/17/24 showed metastatic carcinoma, favor breast primary, ER/CT negative. Her2 is negative.  - S/P palliative RT 2000cGy to T6 lesion and completed RT on 5/22/24. - She took Xeloda for 3 months, 1500 mg twice a day, one week on and one week off. Due to frequent diarrhea, dose reduced to Xeloda 1000 mg q12h, 1 week on and 1 week off.  She developed multiple side effects and Xeloda was discontinued.  -- PET/CT in 9/16/24 showed interval resolution of uptake in T6 and New uptake in L1 and new pleural effusion.  -- MRI of lumbar spine 9/25/24 showed infiltrative enhancing bone marrow signal abnormality within the T11 and L1 vertebral bodies most compatible with osseous metastatic disease. Mild compression deformity of L1, likely pathologic. -- She received XRT 5 fractions 2000cGy to Vertebrae, Lumbar on 10/1/24 - 10/7/24. -- Recurrent breast cancer in the spine is triple negative. She had palliative RT x 2. Currently, there is no evidence of visceral disease or other site disease. Since her initial breast cancer was hormone receptor and bone met biopsy could have inadequate IHC staining for hormone receptor, she has been taking Letrozole.  -- Repeat PET/CT on 1/7/25 showed several new foci of pathologic FDG uptake in bones, suspicious for biologic tumor activity. Interval resolution of previously noted small right pleural effusion. -- In light of progression of disease, she started Taxol 80 mg weekly, 2 weeks on and one week off. Will proceed with the 3rd cycle. -- Will start X-Geva injection. Dental clearance was done.  -- Mild elevated AST and ALT. Will adjust dose if they are trending up further.  -- Order blood work: CBC, BMP, LFT, CEA and .  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 She saw neurosurgeon.   #Low back pain.  -- NSAIDs as needed.  -- F/U with Neurosurgery. She saw Dr Taylor and is planned for L1 kyphoplasty on 3/18/25.  # H/O elevated serum calcium. MM panel showed no evidence of M-spike. Serum light chain ration is normal. Repeat serum calcium is normal and PTH is also normal.  # Osteoporosis/osteopenia.  -- Prolia every 6 months. -- Encourage regular exercise as tolerated. -- She has history of elevated Calcium levels. Advised to take Vitamin 1000 units daily.  RTO for follow up in 3 weeks to see Dr Tovar..

## 2025-03-15 NOTE — REVIEW OF SYSTEMS
[Diarrhea: Grade 1 - Increase of <4 stools per day over baseline; mild increase in ostomy output compared to baseline] : Diarrhea: Grade 1 - Increase of <4 stools per day over baseline; mild increase in ostomy output compared to baseline [Joint Pain] : joint pain [Muscle Pain] : muscle pain [Negative] : Allergic/Immunologic [FreeTextEntry7] : Intermittent diarrhea, managed with Imodium. [FreeTextEntry9] : Low back pain, managed with Aleeve.

## 2025-03-15 NOTE — PHYSICAL EXAM
[Fully active, able to carry on all pre-disease performance without restriction] : Status 0 - Fully active, able to carry on all pre-disease performance without restriction [Normal] : affect appropriate [de-identified] :  Status post bilateral mastectomy and autologous tissue reconstruction. There is no skin lesion and no palpable axillary lymphadenopathy. [de-identified] : Lower abdominal fat necrosis. Abdominal scar present.  [de-identified] : Low back pain 2' metastatic disease.

## 2025-04-03 NOTE — PHYSICAL EXAM
[Fully active, able to carry on all pre-disease performance without restriction] : Status 0 - Fully active, able to carry on all pre-disease performance without restriction [Normal] : affect appropriate [de-identified] :  Status post bilateral mastectomy and autologous tissue reconstruction. There is no skin lesion and no palpable axillary lymphadenopathy. [de-identified] : Lower abdominal fat necrosis. Abdominal scar present.  [de-identified] : Low back pain 2' metastatic disease.

## 2025-04-03 NOTE — HISTORY OF PRESENT ILLNESS
[de-identified] : This 68-year-old female is here today for a followup visit.  She has a history of bilateral invasive lobular carcinoma, stage IIIA (pT1c N2a M0) in the right breast, and stage IA (pT1b N0 M0) in the left breast.  Both sided breast cancer were ER/RI positive and HER2/deana negative.  She had bilateral mastectomy, bilateral sentinel lymph node biopsy, and right axillary lymph node dissection at [facility] on 01/08/2013.  She received adjuvant chemotherapy with Adriamycin and Cytoxan followed by paclitaxel.  She also received post mastectomy adjuvant radiotherapy to right side chest wall.  She has been on adjuvant endocrine therapy with Femara since 7/2013  and has been tolerating well.  She had genetic test for BRCA mutations.  She is negative for BRCA1/2 mutations.  In 10/2014, she had a bone density at [facility], which showed osteopenia. Her latest bone density was done 7/21/16 which showed osteopenia. MRI of breasts was done on 1/30/17 which showed no MR evidence of malignancy in either breast. Recommendation: Unless otherwise indicated by clinical findings, annual screening mammography recommended. This can be alternated at 6 month intervals with bilateral screening breast MRI.\par   She has been taking calcium and vitamin D supplements. Latest colonoscopy 11/2016 pt states it was negative. Saw Dr. Garcia 1/2017.\par  She saw Dr. Rankin recently. She is scheduled to have a revision of her lower abdominal fat necrosis on 1/2018.\par  \par   [de-identified] : 6/8/18: She stopped letrozole few months ago as she was found to be in Afib by her cardiologist on Pre-op clearance for abdominal fat necrosis. However her cardiologist () told her that you can resume Letrozole. She had surgery for abdominal fat necrosis and she is going for surgery for incisional hernia by .   12/13/18: The patient is here for followup visit. She has been taking Letrozole daily (since 7/2013) and tolerating well. She had hernia repair surgery. She complains some pain in her knees. She had MRI breast in 1 2017. There was no suspicious finding.  6/14/19; The patient is here for followup visit. She has been taking Letrozole daily (since 7/2013) and tolerating well. She complains some pain in her knees. She does not have other new complains. She has mild elevated calcium. PHT was normal.  12/13/2019 :  The patient is here for follow up. She has been on letrozole daily and tolerating well. She had a bone density 12/2/109 which showed osteopenia of femur and hip. She is not taking calcium and vit D since her last calcium was elevated 10.7. She had a recent US breasts 12/2019 for breast pain which was negative. She was seen by her cardiologist and had blood work in the summer. She was told that she has severe anemia. She started her on iron pills. She denies any vaginal bleeding or rectal bleeding  6/12/2020: The patient is here for follow up. She had bilateral invasive lobular carcinoma of the breast, ER/SD positive and HER2/deana negative, status post bilateral mastectomy, bilateral sentinel lymph node biopsy, and right axillary lymph node dissection in 1/2013, status post adjuvant chemotherapy and adjuvant chest wall radiotherapy, currently on adjuvant endocrine therapy with letrozole and tolerating well. She had a bone density 12/2/19 which showed osteopenia of femur and hip. She is not taking calcium and vit D since her last calcium was elevated 10.7. She had a recent US breasts 12/2019 for breast pain which was negative. She does not have new complains.  12/02/2020: MANOJ is here for follow up visit for bilateral invasive lobular carcinoma of the breast, ER/SD positive and HER2/deana negative, status post bilateral mastectomy, bilateral sentinel lymph node biopsy, and right axillary lymph node dissection in 1/2013, status post adjuvant chemotherapy and adjuvant chest wall radiotherapy, currently on adjuvant endocrine therapy with letrozole and tolerating well besides mild arthralgia. She had a bone density 12/2/19 which showed osteopenia of femur and hip. She continues on Vitamin D daily. She denies any new breast symptoms at this time. In addition, patient was diagnosed with COVID in 3/2020.   06/02/2021: MANOJ is here for follow up visit for bilateral invasive lobular carcinoma of the breast, ER/SD positive and HER2/deana negative, status post bilateral mastectomy, bilateral sentinel lymph node biopsy, and right axillary lymph node dissection in 1/2013, status post adjuvant chemotherapy and adjuvant chest wall radiotherapy, currently on adjuvant endocrine therapy with letrozole since 7/2013 and tolerating well besides mild arthralgia. She had a bone density 12/2/19 which showed osteopenia of femur and hip. She continues on Vitamin D daily. She denies any new breast symptoms at this time. she had right breast skin tag biopsied by dermatologist; which was benign.   1/5/22 MANOJ is here for follow up visit for bilateral invasive lobular carcinoma of the breast, ER/SD positive and HER2/deana negative, status post bilateral mastectomy, bilateral sentinel lymph node biopsy, and right axillary lymph node dissection in 1/2013, status post adjuvant chemotherapy and adjuvant chest wall radiotherapy, currently on adjuvant endocrine therapy with letrozole since 7/2013 and tolerating well besides mild arthralgia. She had a bone density 12/7/21 which showed worsening osteopenia of femur and hip, T score -2.4. She is not complaint with Vitamin D, does not take calcium due to mildly elevated Calcium. She denies any new breast symptoms at this time. She was experiencing lower abdominal and pelvic pain in Sept had US which was unremarkable. In addition, had Thyroid US due to hypercalcemia which was unremarkable, In 8/2021 TSH 2.36, Calcium 10, and PTH was 78.   7/21/22: Manoj is here for follow up visit for bilateral invasive lobular carcinoma of the breast, ER/SD positive and HER2/deana negative, status post bilateral mastectomy, bilateral sentinel lymph node biopsy, and right axillary lymph node dissection in 1/2013, status post adjuvant chemotherapy and adjuvant chest wall radiotherapy, currently on adjuvant endocrine therapy with letrozole since 7/2013 and tolerating well besides mild arthralgia. She had a bone density 12/7/21 which showed worsening osteopenia of femur and hip, T score -2.4. She takes Vitamin D but not calcium because her calcium has been mildly elevated. She was found to have large hiatal hernia and she has had on and off diarrhea for long time. She has been seen by Dr. Borjas.   01/26/2023 Manoj is here for follow up visit for bilateral invasive lobular carcinoma of the breast, ER/SD positive and HER2/deana negative, status post bilateral mastectomy, bilateral sentinel lymph node biopsy, and right axillary lymph node dissection in 1/2013, status post adjuvant chemotherapy and adjuvant chest wall radiotherapy, currently on adjuvant endocrine therapy with letrozole since 7/2013 and tolerating well besides mild arthralgia. She had a bone density 12/7/21 which showed worsening osteopenia of femur and hip, T score -2.4. She takes Vitamin D but not calcium because her calcium has been mildly elevated. She was found to have large hiatal hernia and surgery on 11/21/22. Her Hb drooped to 9.5 g/dl after surgery. She was found to have high serum calcium but repeat calcium was normal. PTH was also within normal limit.  7/20/23 Manoj is here for follow up visit for bilateral invasive lobular carcinoma of the breast, ER/SD positive and HER2/deana negative, status post bilateral mastectomy, bilateral sentinel lymph node biopsy, and right axillary lymph node dissection in 1/2013, status post adjuvant chemotherapy and adjuvant chest wall radiotherapy, currently on adjuvant endocrine therapy with letrozole since 7/2013 and tolerating well besides mild arthralgia. She had a bone density 12/7/21 which showed worsening osteopenia of femur and hip, T score -2.4. She takes Vitamin D but not calcium because her calcium has been mildly elevated. Repeat serum calcium normal, MM panel normal. She was found to have large hiatal hernia and surgery on 11/21/22. Her Hb drooped to 9.5 g/dl after surgery, now normal. After hernia repair she was having symptoms consistent with vagal nerve mediated gastroparesis, she then had endoscopic pyloromyotomy, symptoms resolved.   1/25/2024: Manoj is here for follow up visit for bilateral invasive lobular carcinoma of the breast, ER/SD positive and HER2/deana negative, status post bilateral mastectomy, bilateral sentinel lymph node biopsy, and right axillary lymph node dissection in 1/2013, status post adjuvant chemotherapy and adjuvant chest wall radiotherapy, currently on adjuvant endocrine therapy with letrozole since 7/2013 and tolerating well besides mild arthralgia. She Completed 10 years treatment of Letrozole in 7/2023. She had a bone density 12/8/23 Showed:         ----------------------------------------------------------------- AP Spine (L1-L4)         0.998   -0.4      1.8     Normal Femoral Neck (Left) 0.552   -2.7     -0.7     Osteoporosis Total Hip (Left) 0.676   -2.2     -0.6     Osteopenia -which showed Osteoporosis of femur Neck and Osteopenia in the Hip. She takes Vitamin D.  After hernia repair, she was having symptoms consistent with vagal nerve mediated gastroparesis, she then had endoscopic pyloromyotomy, symptoms resolved.  She starts feeling better now.   3/29/24: Manoj is here for follow up visit. She has h/o bilateral invasive lobular carcinoma of the breast, ER/SD positive and HER2/deana negative, status post bilateral mastectomy, bilateral sentinel lymph node biopsy, and right axillary lymph node dissection in 1/2013, status post adjuvant chemotherapy and adjuvant chest wall radiotherapy, currently on adjuvant endocrine therapy with letrozole since 7/2013. She had CT AP in 2/2024 for abdominal pain which incidentally showed a lesion in T6. She had MRI spine on 3/11/24 which showed a 2.7 cm lesion within the left aspect of the T6 vertebral body and 7 mm marrow replacing lesion within the T11 vertebral body.  PET/CT on 3/28/24 showed Intense pathologic FDG uptake (SUV 9.3) coregistering with sclerotic lesion on the left side of T6 suspicious for biologic tumor activity. No other sites of abnormal FDG uptake. She is here today to discuss further management plan.  4/29/24: Manoj is here for follow up visit. She has h/o bilateral invasive lobular carcinoma of the breast, ER/SD positive and HER2/deana negative, status post bilateral mastectomy, bilateral sentinel lymph node biopsy, and right axillary lymph node dissection in 1/2013, status post adjuvant chemotherapy and adjuvant chest wall radiotherapy, Completed 10-year of endocrine therapy with Letrozole in 7/2023. She had CT AP in 2/2024 for abdominal pain which incidentally showed a lesion in T6. She had MRI spine on 3/11/24 which showed a 2.7 cm lesion within the left aspect of the T6 vertebral body and 7 mm marrow replacing lesion within the T11 vertebral body.  PET/CT on 3/28/24 showed Intense pathologic FDG uptake (SUV 9.3) coregistering with sclerotic lesion on the left side of T6 suspicious for biologic tumor activity. No other sites of abnormal FDG uptake. She had CT guided biopsy on 4/17/24 and is here today to discuss the result.     5/30/24: Manoj is here for follow up visit. She has h/o bilateral invasive lobular carcinoma of the breast, ER/SD positive and HER2/deana negative, status post bilateral mastectomy, bilateral sentinel lymph node biopsy, and right axillary lymph node dissection in 1/2013, status post adjuvant chemotherapy and adjuvant chest wall radiotherapy, Completed 10-year of endocrine therapy with Letrozole in 7/2023. She had CT AP in 2/2024 for abdominal pain which incidentally showed a lesion in T6. She had MRI spine on 3/11/24 which showed a 2.7 cm lesion within the left aspect of the T6 vertebral body and 7 mm marrow replacing lesion within the T11 vertebral body.  PET/CT on 3/28/24 showed Intense pathologic FDG uptake (SUV 9.3) coregistering with sclerotic lesion on the left side of T6 suspicious for biologic tumor activity.  She had CT guided biopsy on 4/17/24 which showed metastatic carcinoma consistent from breast primary. She saw Dr. Anaya and received palliative RT 2000cGy to T6 lesion and completed RT on 5/22/24.  6/27/24: Manoj is here for follow up visit. She has h/o bilateral invasive lobular carcinoma of the breast, ER/SD positive and HER2/deana negative, status post bilateral mastectomy, bilateral sentinel lymph node biopsy, and right axillary lymph node dissection in 1/2013, status post adjuvant chemotherapy and adjuvant chest wall radiotherapy, Completed 10-year of endocrine therapy with Letrozole in 7/2023. She had CT AP in 2/2024 for abdominal pain which incidentally showed a lesion in T6. She had MRI spine on 3/11/24 which showed a 2.7 cm lesion within the left aspect of the T6 vertebral body and 7 mm marrow replacing lesion within the T11 vertebral body.  PET/CT on 3/28/24 showed Intense pathologic FDG uptake (SUV 9.3) coregistering with sclerotic lesion on the left side of T6 suspicious for biologic tumor activity.  She had CT guided biopsy on 4/17/24 which showed metastatic carcinoma consistent from breast primary. She saw Dr. Anaya and received palliative RT 2000cGy to T6 lesion and completed RT on 5/22/24. She started on Xeloda and had 1st week treatment. She had loose stool but otherwise tolerated well.   08/01/24: bilateral sentinel lymph node biopsy, and right axillary lymph node dissection in 1/2013, status post adjuvant chemotherapy and adjuvant chest wall radiotherapy, Completed 10-year of endocrine therapy with Letrozole in 7/2023. She had CT AP in 2/2024 for abdominal pain which incidentally showed a lesion in T6. She had MRI spine on 3/11/24 which showed a 2.7 cm lesion within the left aspect of the T6 vertebral body and 7 mm marrow replacing lesion within the T11 vertebral body.  PET/CT on 3/28/24 showed Intense pathologic FDG uptake (SUV 9.3) coregistering with sclerotic lesion on the left side of T6 suspicious for biologic tumor activity.  She had CT guided biopsy on 4/17/24 which showed metastatic carcinoma consistent from breast primary. She saw Dr. Anaya and received palliative RT 2000cGy to T6 lesion and completed RT on 5/22/24. She started on Xeloda and had 1st week of June 2024. She is complaining of persistence diarrhea despite Imodium intake, currently on Xeloda 3tabs every 12 hrs for one week on and one week off.   8/29/24: Manoj is here for follow up visit. She has h/o bilateral invasive lobular carcinoma of the breast, ER/SD positive and HER2/deana negative, status post bilateral mastectomy, bilateral sentinel lymph node biopsy, and right axillary lymph node dissection in 1/2013, status post adjuvant chemotherapy and adjuvant chest wall radiotherapy, Completed 10-year of endocrine therapy with Letrozole in 7/2023. She had CT AP in 2/2024 for abdominal pain which incidentally showed a lesion in T6. She had MRI spine on 3/11/24 which showed a 2.7 cm lesion within the left aspect of the T6 vertebral body and 7 mm marrow replacing lesion within the T11 vertebral body.  PET/CT on 3/28/24 showed Intense pathologic FDG uptake (SUV 9.3) coregistering with sclerotic lesion on the left side of T6 suspicious for biologic tumor activity.  She had CT guided biopsy on 4/17/24 which showed metastatic carcinoma consistent from breast primary. She saw Dr. Anaya and received palliative RT 2000cGy to T6 lesion and completed RT on 5/22/24. She has been on Xeloda and did not tolerate well. She complains diarrhea, weight loss and eye irritation.   10/10/24 Manoj is here for follow up visit. She has h/o bilateral invasive lobular carcinoma of the breast, ER/SD positive and HER2/deana negative, status post bilateral mastectomy, bilateral sentinel lymph node biopsy, and right axillary lymph node dissection in 1/2013, status post adjuvant chemotherapy and adjuvant chest wall radiotherapy, Completed 10-year of endocrine therapy with Letrozole in 7/2023. She had CT AP in 2/2024 for abdominal pain which incidentally showed a lesion in T6. She had MRI spine on 3/11/24 which showed a 2.7 cm lesion within the left aspect of the T6 vertebral body and 7 mm marrow replacing lesion within the T11 vertebral body.  PET/CT on 3/28/24 showed Intense pathologic FDG uptake (SUV 9.3) coregistering with sclerotic lesion on the left side of T6 suspicious for biologic tumor activity.  She had CT guided biopsy on 4/17/24 which showed metastatic carcinoma consistent from breast primary. She saw Dr. Anaya and received palliative RT 2000cGy to T6 lesion and completed RT on 5/22/24. She took Xeloda for 3 months and did not tolerate well. Xeloda was discontinued. She had a PET scan in 9.24 which showed interval resolution of uptake in T6 and New uptake in L1 and new pleural effussion. She completed XRT 5 fractions in 9.24.  She is here for follow up.  11/11/24 Manoj is here for follow up visit. She has h/o bilateral invasive lobular carcinoma of the breast, ER/SD positive and HER2/deana negative, status post bilateral mastectomy, bilateral sentinel lymph node biopsy, and right axillary lymph node dissection in 1/2013, status post adjuvant chemotherapy and adjuvant chest wall radiotherapy, Completed 10-year of endocrine therapy with Letrozole in 7/2023. She developed recurrent disease with a lesion in T6 in 2/2024. CT guided biopsy on 4/17/24 showed metastatic carcinoma consistent from breast primary. She saw Dr. Anaya and received palliative RT 2000cGy to T6 lesion and completed RT on 5/22/24. She took Xeloda for 3 months and did not tolerate well. Xeloda was discontinued. She had repeat PET/CT in 9.24 which showed interval resolution of uptake in T6 but new uptake in L1 and new pleural effusion. She completed XRT 5 fractions. She complains low back pain.   12/30/24 Manoj is here for follow up visit. Her spouse is with her. She has h/o bilateral invasive lobular carcinoma of the breast, ER/SD positive and HER2/deana negative, s/p bilateral mastectomy, bilateral sentinel lymph node biopsy, and right axillary lymph node dissection in 1/2013, s/p adjuvant chemotherapy and adjuvant chest wall radiotherapy, Completed 10-year of endocrine therapy with Letrozole in 7/2023. She developed recurrent disease with a lesion in T6 in 2/2024. CT guided biopsy on 4/17/24 showed metastatic carcinoma consistent from breast primary. She saw Dr. Anaya and received palliative RT 2000cGy to T6 lesion and completed RT on 5/22/24. She took Xeloda for 3 months and did not tolerate well. Xeloda was discontinued. She had repeat PET/CT in 9/16/24 which showed interval resolution of uptake in T6 but new uptake in L1 and new pleural effusion. MR Lumbar spine on 9/25/24 showed Infiltrative enhancing bone marrow signal abnormality within the T11 and L1 vertebral bodies most compatible with osseous metastatic disease. Mild compression deformity of L1, likely pathologic. Multilevel degenerative changes as described. She completed XRT 5 fractions on 10/1/24 -10/7/24. She has a history of fall on 9/2024. CT chest 12/12/24 showed no pleural effusion. She complains low back pain, taking Aleeve as needed with relief.  1/14/25: Manoj is here for follow up visit. Her spouse is with her. She has h/o bilateral invasive lobular carcinoma of the breast, ER/SD positive and HER2/deana negative, s/p bilateral mastectomy, bilateral sentinel lymph node biopsy, and right axillary lymph node dissection in 1/2013, s/p adjuvant chemotherapy and adjuvant chest wall radiotherapy, Completed 10-year of endocrine therapy with Letrozole in 7/2023. She developed recurrent disease with a lesion in T6 in 2/2024. CT guided biopsy on 4/17/24 showed metastatic carcinoma consistent from breast primary. She saw Dr. Anaya and received palliative RT 2000cGy to T6 lesion and completed RT on 5/22/24. She took Xeloda for 3 months and did not tolerate. Xeloda was discontinued. She had repeat PET/CT in 9/16/24 which showed interval resolution of uptake in T6 but new uptake in L1 and new pleural effusion.  She completed XRT 5 fractions on 10/1/24 -10/7/24. She has been taking Letrozole. She feels some aches and pains.   2/20/25 Manoj is here for follow up visit. Her spouse is with her. She has h/o bilateral invasive lobular carcinoma of the breast, ER/SD positive and HER2/deana negative, s/p bilateral mastectomy, bilateral sentinel lymph node biopsy, and right axillary lymph node dissection in 1/2013, s/p adjuvant chemotherapy and adjuvant chest wall radiotherapy, Completed 10-year of endocrine therapy with Letrozole in 7/2023. She developed recurrent disease with a lesion in T6 in 2/2024. CT guided biopsy on 4/17/24 showed metastatic carcinoma consistent from breast primary. She saw Dr. Anaya and received palliative RT 2000cGy to T6 lesion and completed RT on 5/22/24. She took Xeloda for 3 months and did not tolerate. Xeloda was discontinued. She had repeat PET/CT in 9/16/24 which showed interval resolution of uptake in T6 but new uptake in L1 and new pleural effusion. She completed XRT 5 fractions on 10/1/24 -10/7/24. She has been taking Letrozole. PET/CT on 1/7/25 showed several new foci of pathologic FDG uptake in bones, suspicious for biologic tumor activity. Interval resolution of previously noted small right pleural effusion. She feels some aches and pains. She started weekly Taxol, 2 weeks on and 1 week off on 1/31/25, s/p cycle #1 and is tolerating well except for diarrhea yesterday, managed with Imodium. She denies nausea, vomiting, or numbness or tingling of hands/feet. She saw Dr Taylor and is planned for L1 kyphoplasty on 3/18/25.  3/13/25 Manoj is here for follow up visit. Her spouse is with her. She has h/o bilateral invasive lobular carcinoma of the breast, ER/SD positive and HER2/deana negative, s/p bilateral mastectomy, bilateral sentinel lymph node biopsy, and right axillary lymph node dissection in 1/2013, s/p adjuvant chemotherapy and adjuvant chest wall radiotherapy, Completed 10-year of endocrine therapy with Letrozole in 7/2023. She developed recurrent disease with a lesion in T6 in 2/2024. CT guided biopsy on 4/17/24 showed metastatic carcinoma consistent from breast primary. She saw Dr. Anaya and received palliative RT 2000cGy to T6 lesion and completed RT on 5/22/24. She took Xeloda for 3 months and did not tolerate. Xeloda was discontinued. She had repeat PET/CT in 9/16/24 which showed interval resolution of uptake in T6 but new uptake in L1 and new pleural effusion. She completed XRT 5 fractions on 10/1/24 -10/7/24. She has been taking Letrozole. PET/CT on 1/7/25 showed several new foci of pathologic FDG uptake in bones, suspicious for biologic tumor activity. She started weekly Taxol, 2 weeks on and 1 week off on 1/31/25, s/p 2 cycles and tolerated.    4/3/25 Manoj is here for follow up visit. Her spouse is with her. She has h/o bilateral invasive lobular carcinoma of the breast, ER/SD positive and HER2/deana negative, s/p bilateral mastectomy, bilateral sentinel lymph node biopsy, and right axillary lymph node dissection in 1/2013, s/p adjuvant chemotherapy and adjuvant chest wall radiotherapy, Completed 10-year of endocrine therapy with Letrozole in 7/2023. She developed recurrent disease with a lesion in T6 in 2/2024. CT guided biopsy on 4/17/24 showed metastatic carcinoma consistent from breast primary. She saw Dr. Anaya and received palliative RT 2000cGy to T6 lesion and completed RT on 5/22/24. She took Xeloda for 3 months and did not tolerate. Xeloda was discontinued. She had repeat PET/CT in 9/16/24 which showed interval resolution of uptake in T6 but new uptake in L1 and new pleural effusion. She completed XRT 5 fractions on 10/1/24 -10/7/24. She was taking Letrozole. PET/CT on 1/7/25 showed several new foci of pathologic FDG uptake in bones, suspicious for biologic tumor activity. She started weekly Taxol, 2 weeks on and 1 week off on 1/31/25, s/p 3 cycles and tolerated. She feels some numbness and tingling in her hands and feet. She does not have n/v/d, mouth sore, chill or fever.

## 2025-04-03 NOTE — PHYSICAL EXAM
[Fully active, able to carry on all pre-disease performance without restriction] : Status 0 - Fully active, able to carry on all pre-disease performance without restriction [Normal] : affect appropriate [de-identified] :  Status post bilateral mastectomy and autologous tissue reconstruction. There is no skin lesion and no palpable axillary lymphadenopathy. [de-identified] : Lower abdominal fat necrosis. Abdominal scar present.  [de-identified] : Low back pain 2' metastatic disease.

## 2025-04-03 NOTE — HISTORY OF PRESENT ILLNESS
[de-identified] : This 68-year-old female is here today for a followup visit.  She has a history of bilateral invasive lobular carcinoma, stage IIIA (pT1c N2a M0) in the right breast, and stage IA (pT1b N0 M0) in the left breast.  Both sided breast cancer were ER/MS positive and HER2/deana negative.  She had bilateral mastectomy, bilateral sentinel lymph node biopsy, and right axillary lymph node dissection at [facility] on 01/08/2013.  She received adjuvant chemotherapy with Adriamycin and Cytoxan followed by paclitaxel.  She also received post mastectomy adjuvant radiotherapy to right side chest wall.  She has been on adjuvant endocrine therapy with Femara since 7/2013  and has been tolerating well.  She had genetic test for BRCA mutations.  She is negative for BRCA1/2 mutations.  In 10/2014, she had a bone density at [facility], which showed osteopenia. Her latest bone density was done 7/21/16 which showed osteopenia. MRI of breasts was done on 1/30/17 which showed no MR evidence of malignancy in either breast. Recommendation: Unless otherwise indicated by clinical findings, annual screening mammography recommended. This can be alternated at 6 month intervals with bilateral screening breast MRI.\par   She has been taking calcium and vitamin D supplements. Latest colonoscopy 11/2016 pt states it was negative. Saw Dr. Garcia 1/2017.\par  She saw Dr. Rankin recently. She is scheduled to have a revision of her lower abdominal fat necrosis on 1/2018.\par  \par   [de-identified] : 6/8/18: She stopped letrozole few months ago as she was found to be in Afib by her cardiologist on Pre-op clearance for abdominal fat necrosis. However her cardiologist () told her that you can resume Letrozole. She had surgery for abdominal fat necrosis and she is going for surgery for incisional hernia by .   12/13/18: The patient is here for followup visit. She has been taking Letrozole daily (since 7/2013) and tolerating well. She had hernia repair surgery. She complains some pain in her knees. She had MRI breast in 1 2017. There was no suspicious finding.  6/14/19; The patient is here for followup visit. She has been taking Letrozole daily (since 7/2013) and tolerating well. She complains some pain in her knees. She does not have other new complains. She has mild elevated calcium. PHT was normal.  12/13/2019 :  The patient is here for follow up. She has been on letrozole daily and tolerating well. She had a bone density 12/2/109 which showed osteopenia of femur and hip. She is not taking calcium and vit D since her last calcium was elevated 10.7. She had a recent US breasts 12/2019 for breast pain which was negative. She was seen by her cardiologist and had blood work in the summer. She was told that she has severe anemia. She started her on iron pills. She denies any vaginal bleeding or rectal bleeding  6/12/2020: The patient is here for follow up. She had bilateral invasive lobular carcinoma of the breast, ER/NH positive and HER2/deana negative, status post bilateral mastectomy, bilateral sentinel lymph node biopsy, and right axillary lymph node dissection in 1/2013, status post adjuvant chemotherapy and adjuvant chest wall radiotherapy, currently on adjuvant endocrine therapy with letrozole and tolerating well. She had a bone density 12/2/19 which showed osteopenia of femur and hip. She is not taking calcium and vit D since her last calcium was elevated 10.7. She had a recent US breasts 12/2019 for breast pain which was negative. She does not have new complains.  12/02/2020: MANOJ is here for follow up visit for bilateral invasive lobular carcinoma of the breast, ER/NH positive and HER2/deana negative, status post bilateral mastectomy, bilateral sentinel lymph node biopsy, and right axillary lymph node dissection in 1/2013, status post adjuvant chemotherapy and adjuvant chest wall radiotherapy, currently on adjuvant endocrine therapy with letrozole and tolerating well besides mild arthralgia. She had a bone density 12/2/19 which showed osteopenia of femur and hip. She continues on Vitamin D daily. She denies any new breast symptoms at this time. In addition, patient was diagnosed with COVID in 3/2020.   06/02/2021: MANOJ is here for follow up visit for bilateral invasive lobular carcinoma of the breast, ER/NH positive and HER2/deana negative, status post bilateral mastectomy, bilateral sentinel lymph node biopsy, and right axillary lymph node dissection in 1/2013, status post adjuvant chemotherapy and adjuvant chest wall radiotherapy, currently on adjuvant endocrine therapy with letrozole since 7/2013 and tolerating well besides mild arthralgia. She had a bone density 12/2/19 which showed osteopenia of femur and hip. She continues on Vitamin D daily. She denies any new breast symptoms at this time. she had right breast skin tag biopsied by dermatologist; which was benign.   1/5/22 MANOJ is here for follow up visit for bilateral invasive lobular carcinoma of the breast, ER/NH positive and HER2/deana negative, status post bilateral mastectomy, bilateral sentinel lymph node biopsy, and right axillary lymph node dissection in 1/2013, status post adjuvant chemotherapy and adjuvant chest wall radiotherapy, currently on adjuvant endocrine therapy with letrozole since 7/2013 and tolerating well besides mild arthralgia. She had a bone density 12/7/21 which showed worsening osteopenia of femur and hip, T score -2.4. She is not complaint with Vitamin D, does not take calcium due to mildly elevated Calcium. She denies any new breast symptoms at this time. She was experiencing lower abdominal and pelvic pain in Sept had US which was unremarkable. In addition, had Thyroid US due to hypercalcemia which was unremarkable, In 8/2021 TSH 2.36, Calcium 10, and PTH was 78.   7/21/22: Manoj is here for follow up visit for bilateral invasive lobular carcinoma of the breast, ER/NH positive and HER2/deana negative, status post bilateral mastectomy, bilateral sentinel lymph node biopsy, and right axillary lymph node dissection in 1/2013, status post adjuvant chemotherapy and adjuvant chest wall radiotherapy, currently on adjuvant endocrine therapy with letrozole since 7/2013 and tolerating well besides mild arthralgia. She had a bone density 12/7/21 which showed worsening osteopenia of femur and hip, T score -2.4. She takes Vitamin D but not calcium because her calcium has been mildly elevated. She was found to have large hiatal hernia and she has had on and off diarrhea for long time. She has been seen by Dr. Borjas.   01/26/2023 Manoj is here for follow up visit for bilateral invasive lobular carcinoma of the breast, ER/NH positive and HER2/deana negative, status post bilateral mastectomy, bilateral sentinel lymph node biopsy, and right axillary lymph node dissection in 1/2013, status post adjuvant chemotherapy and adjuvant chest wall radiotherapy, currently on adjuvant endocrine therapy with letrozole since 7/2013 and tolerating well besides mild arthralgia. She had a bone density 12/7/21 which showed worsening osteopenia of femur and hip, T score -2.4. She takes Vitamin D but not calcium because her calcium has been mildly elevated. She was found to have large hiatal hernia and surgery on 11/21/22. Her Hb drooped to 9.5 g/dl after surgery. She was found to have high serum calcium but repeat calcium was normal. PTH was also within normal limit.  7/20/23 Manoj is here for follow up visit for bilateral invasive lobular carcinoma of the breast, ER/NH positive and HER2/deana negative, status post bilateral mastectomy, bilateral sentinel lymph node biopsy, and right axillary lymph node dissection in 1/2013, status post adjuvant chemotherapy and adjuvant chest wall radiotherapy, currently on adjuvant endocrine therapy with letrozole since 7/2013 and tolerating well besides mild arthralgia. She had a bone density 12/7/21 which showed worsening osteopenia of femur and hip, T score -2.4. She takes Vitamin D but not calcium because her calcium has been mildly elevated. Repeat serum calcium normal, MM panel normal. She was found to have large hiatal hernia and surgery on 11/21/22. Her Hb drooped to 9.5 g/dl after surgery, now normal. After hernia repair she was having symptoms consistent with vagal nerve mediated gastroparesis, she then had endoscopic pyloromyotomy, symptoms resolved.   1/25/2024: Manoj is here for follow up visit for bilateral invasive lobular carcinoma of the breast, ER/NH positive and HER2/deana negative, status post bilateral mastectomy, bilateral sentinel lymph node biopsy, and right axillary lymph node dissection in 1/2013, status post adjuvant chemotherapy and adjuvant chest wall radiotherapy, currently on adjuvant endocrine therapy with letrozole since 7/2013 and tolerating well besides mild arthralgia. She Completed 10 years treatment of Letrozole in 7/2023. She had a bone density 12/8/23 Showed:         ----------------------------------------------------------------- AP Spine (L1-L4)         0.998   -0.4      1.8     Normal Femoral Neck (Left) 0.552   -2.7     -0.7     Osteoporosis Total Hip (Left) 0.676   -2.2     -0.6     Osteopenia -which showed Osteoporosis of femur Neck and Osteopenia in the Hip. She takes Vitamin D.  After hernia repair, she was having symptoms consistent with vagal nerve mediated gastroparesis, she then had endoscopic pyloromyotomy, symptoms resolved.  She starts feeling better now.   3/29/24: Manoj is here for follow up visit. She has h/o bilateral invasive lobular carcinoma of the breast, ER/NH positive and HER2/deana negative, status post bilateral mastectomy, bilateral sentinel lymph node biopsy, and right axillary lymph node dissection in 1/2013, status post adjuvant chemotherapy and adjuvant chest wall radiotherapy, currently on adjuvant endocrine therapy with letrozole since 7/2013. She had CT AP in 2/2024 for abdominal pain which incidentally showed a lesion in T6. She had MRI spine on 3/11/24 which showed a 2.7 cm lesion within the left aspect of the T6 vertebral body and 7 mm marrow replacing lesion within the T11 vertebral body.  PET/CT on 3/28/24 showed Intense pathologic FDG uptake (SUV 9.3) coregistering with sclerotic lesion on the left side of T6 suspicious for biologic tumor activity. No other sites of abnormal FDG uptake. She is here today to discuss further management plan.  4/29/24: Manoj is here for follow up visit. She has h/o bilateral invasive lobular carcinoma of the breast, ER/NH positive and HER2/deana negative, status post bilateral mastectomy, bilateral sentinel lymph node biopsy, and right axillary lymph node dissection in 1/2013, status post adjuvant chemotherapy and adjuvant chest wall radiotherapy, Completed 10-year of endocrine therapy with Letrozole in 7/2023. She had CT AP in 2/2024 for abdominal pain which incidentally showed a lesion in T6. She had MRI spine on 3/11/24 which showed a 2.7 cm lesion within the left aspect of the T6 vertebral body and 7 mm marrow replacing lesion within the T11 vertebral body.  PET/CT on 3/28/24 showed Intense pathologic FDG uptake (SUV 9.3) coregistering with sclerotic lesion on the left side of T6 suspicious for biologic tumor activity. No other sites of abnormal FDG uptake. She had CT guided biopsy on 4/17/24 and is here today to discuss the result.     5/30/24: Manoj is here for follow up visit. She has h/o bilateral invasive lobular carcinoma of the breast, ER/NH positive and HER2/deana negative, status post bilateral mastectomy, bilateral sentinel lymph node biopsy, and right axillary lymph node dissection in 1/2013, status post adjuvant chemotherapy and adjuvant chest wall radiotherapy, Completed 10-year of endocrine therapy with Letrozole in 7/2023. She had CT AP in 2/2024 for abdominal pain which incidentally showed a lesion in T6. She had MRI spine on 3/11/24 which showed a 2.7 cm lesion within the left aspect of the T6 vertebral body and 7 mm marrow replacing lesion within the T11 vertebral body.  PET/CT on 3/28/24 showed Intense pathologic FDG uptake (SUV 9.3) coregistering with sclerotic lesion on the left side of T6 suspicious for biologic tumor activity.  She had CT guided biopsy on 4/17/24 which showed metastatic carcinoma consistent from breast primary. She saw Dr. Anaya and received palliative RT 2000cGy to T6 lesion and completed RT on 5/22/24.  6/27/24: Manoj is here for follow up visit. She has h/o bilateral invasive lobular carcinoma of the breast, ER/NH positive and HER2/deana negative, status post bilateral mastectomy, bilateral sentinel lymph node biopsy, and right axillary lymph node dissection in 1/2013, status post adjuvant chemotherapy and adjuvant chest wall radiotherapy, Completed 10-year of endocrine therapy with Letrozole in 7/2023. She had CT AP in 2/2024 for abdominal pain which incidentally showed a lesion in T6. She had MRI spine on 3/11/24 which showed a 2.7 cm lesion within the left aspect of the T6 vertebral body and 7 mm marrow replacing lesion within the T11 vertebral body.  PET/CT on 3/28/24 showed Intense pathologic FDG uptake (SUV 9.3) coregistering with sclerotic lesion on the left side of T6 suspicious for biologic tumor activity.  She had CT guided biopsy on 4/17/24 which showed metastatic carcinoma consistent from breast primary. She saw Dr. Anaya and received palliative RT 2000cGy to T6 lesion and completed RT on 5/22/24. She started on Xeloda and had 1st week treatment. She had loose stool but otherwise tolerated well.   08/01/24: bilateral sentinel lymph node biopsy, and right axillary lymph node dissection in 1/2013, status post adjuvant chemotherapy and adjuvant chest wall radiotherapy, Completed 10-year of endocrine therapy with Letrozole in 7/2023. She had CT AP in 2/2024 for abdominal pain which incidentally showed a lesion in T6. She had MRI spine on 3/11/24 which showed a 2.7 cm lesion within the left aspect of the T6 vertebral body and 7 mm marrow replacing lesion within the T11 vertebral body.  PET/CT on 3/28/24 showed Intense pathologic FDG uptake (SUV 9.3) coregistering with sclerotic lesion on the left side of T6 suspicious for biologic tumor activity.  She had CT guided biopsy on 4/17/24 which showed metastatic carcinoma consistent from breast primary. She saw Dr. Anaya and received palliative RT 2000cGy to T6 lesion and completed RT on 5/22/24. She started on Xeloda and had 1st week of June 2024. She is complaining of persistence diarrhea despite Imodium intake, currently on Xeloda 3tabs every 12 hrs for one week on and one week off.   8/29/24: Manoj is here for follow up visit. She has h/o bilateral invasive lobular carcinoma of the breast, ER/NH positive and HER2/deana negative, status post bilateral mastectomy, bilateral sentinel lymph node biopsy, and right axillary lymph node dissection in 1/2013, status post adjuvant chemotherapy and adjuvant chest wall radiotherapy, Completed 10-year of endocrine therapy with Letrozole in 7/2023. She had CT AP in 2/2024 for abdominal pain which incidentally showed a lesion in T6. She had MRI spine on 3/11/24 which showed a 2.7 cm lesion within the left aspect of the T6 vertebral body and 7 mm marrow replacing lesion within the T11 vertebral body.  PET/CT on 3/28/24 showed Intense pathologic FDG uptake (SUV 9.3) coregistering with sclerotic lesion on the left side of T6 suspicious for biologic tumor activity.  She had CT guided biopsy on 4/17/24 which showed metastatic carcinoma consistent from breast primary. She saw Dr. Anaya and received palliative RT 2000cGy to T6 lesion and completed RT on 5/22/24. She has been on Xeloda and did not tolerate well. She complains diarrhea, weight loss and eye irritation.   10/10/24 Manoj is here for follow up visit. She has h/o bilateral invasive lobular carcinoma of the breast, ER/NH positive and HER2/deana negative, status post bilateral mastectomy, bilateral sentinel lymph node biopsy, and right axillary lymph node dissection in 1/2013, status post adjuvant chemotherapy and adjuvant chest wall radiotherapy, Completed 10-year of endocrine therapy with Letrozole in 7/2023. She had CT AP in 2/2024 for abdominal pain which incidentally showed a lesion in T6. She had MRI spine on 3/11/24 which showed a 2.7 cm lesion within the left aspect of the T6 vertebral body and 7 mm marrow replacing lesion within the T11 vertebral body.  PET/CT on 3/28/24 showed Intense pathologic FDG uptake (SUV 9.3) coregistering with sclerotic lesion on the left side of T6 suspicious for biologic tumor activity.  She had CT guided biopsy on 4/17/24 which showed metastatic carcinoma consistent from breast primary. She saw Dr. Anaya and received palliative RT 2000cGy to T6 lesion and completed RT on 5/22/24. She took Xeloda for 3 months and did not tolerate well. Xeloda was discontinued. She had a PET scan in 9.24 which showed interval resolution of uptake in T6 and New uptake in L1 and new pleural effussion. She completed XRT 5 fractions in 9.24.  She is here for follow up.  11/11/24 Manoj is here for follow up visit. She has h/o bilateral invasive lobular carcinoma of the breast, ER/NH positive and HER2/deana negative, status post bilateral mastectomy, bilateral sentinel lymph node biopsy, and right axillary lymph node dissection in 1/2013, status post adjuvant chemotherapy and adjuvant chest wall radiotherapy, Completed 10-year of endocrine therapy with Letrozole in 7/2023. She developed recurrent disease with a lesion in T6 in 2/2024. CT guided biopsy on 4/17/24 showed metastatic carcinoma consistent from breast primary. She saw Dr. Anaya and received palliative RT 2000cGy to T6 lesion and completed RT on 5/22/24. She took Xeloda for 3 months and did not tolerate well. Xeloda was discontinued. She had repeat PET/CT in 9.24 which showed interval resolution of uptake in T6 but new uptake in L1 and new pleural effusion. She completed XRT 5 fractions. She complains low back pain.   12/30/24 Manoj is here for follow up visit. Her spouse is with her. She has h/o bilateral invasive lobular carcinoma of the breast, ER/NH positive and HER2/deana negative, s/p bilateral mastectomy, bilateral sentinel lymph node biopsy, and right axillary lymph node dissection in 1/2013, s/p adjuvant chemotherapy and adjuvant chest wall radiotherapy, Completed 10-year of endocrine therapy with Letrozole in 7/2023. She developed recurrent disease with a lesion in T6 in 2/2024. CT guided biopsy on 4/17/24 showed metastatic carcinoma consistent from breast primary. She saw Dr. Anaya and received palliative RT 2000cGy to T6 lesion and completed RT on 5/22/24. She took Xeloda for 3 months and did not tolerate well. Xeloda was discontinued. She had repeat PET/CT in 9/16/24 which showed interval resolution of uptake in T6 but new uptake in L1 and new pleural effusion. MR Lumbar spine on 9/25/24 showed Infiltrative enhancing bone marrow signal abnormality within the T11 and L1 vertebral bodies most compatible with osseous metastatic disease. Mild compression deformity of L1, likely pathologic. Multilevel degenerative changes as described. She completed XRT 5 fractions on 10/1/24 -10/7/24. She has a history of fall on 9/2024. CT chest 12/12/24 showed no pleural effusion. She complains low back pain, taking Aleeve as needed with relief.  1/14/25: Manoj is here for follow up visit. Her spouse is with her. She has h/o bilateral invasive lobular carcinoma of the breast, ER/NH positive and HER2/deana negative, s/p bilateral mastectomy, bilateral sentinel lymph node biopsy, and right axillary lymph node dissection in 1/2013, s/p adjuvant chemotherapy and adjuvant chest wall radiotherapy, Completed 10-year of endocrine therapy with Letrozole in 7/2023. She developed recurrent disease with a lesion in T6 in 2/2024. CT guided biopsy on 4/17/24 showed metastatic carcinoma consistent from breast primary. She saw Dr. Anaya and received palliative RT 2000cGy to T6 lesion and completed RT on 5/22/24. She took Xeloda for 3 months and did not tolerate. Xeloda was discontinued. She had repeat PET/CT in 9/16/24 which showed interval resolution of uptake in T6 but new uptake in L1 and new pleural effusion.  She completed XRT 5 fractions on 10/1/24 -10/7/24. She has been taking Letrozole. She feels some aches and pains.   2/20/25 Manoj is here for follow up visit. Her spouse is with her. She has h/o bilateral invasive lobular carcinoma of the breast, ER/NH positive and HER2/deana negative, s/p bilateral mastectomy, bilateral sentinel lymph node biopsy, and right axillary lymph node dissection in 1/2013, s/p adjuvant chemotherapy and adjuvant chest wall radiotherapy, Completed 10-year of endocrine therapy with Letrozole in 7/2023. She developed recurrent disease with a lesion in T6 in 2/2024. CT guided biopsy on 4/17/24 showed metastatic carcinoma consistent from breast primary. She saw Dr. Anaya and received palliative RT 2000cGy to T6 lesion and completed RT on 5/22/24. She took Xeloda for 3 months and did not tolerate. Xeloda was discontinued. She had repeat PET/CT in 9/16/24 which showed interval resolution of uptake in T6 but new uptake in L1 and new pleural effusion. She completed XRT 5 fractions on 10/1/24 -10/7/24. She has been taking Letrozole. PET/CT on 1/7/25 showed several new foci of pathologic FDG uptake in bones, suspicious for biologic tumor activity. Interval resolution of previously noted small right pleural effusion. She feels some aches and pains. She started weekly Taxol, 2 weeks on and 1 week off on 1/31/25, s/p cycle #1 and is tolerating well except for diarrhea yesterday, managed with Imodium. She denies nausea, vomiting, or numbness or tingling of hands/feet. She saw Dr Taylor and is planned for L1 kyphoplasty on 3/18/25.  3/13/25 Manoj is here for follow up visit. Her spouse is with her. She has h/o bilateral invasive lobular carcinoma of the breast, ER/NH positive and HER2/deana negative, s/p bilateral mastectomy, bilateral sentinel lymph node biopsy, and right axillary lymph node dissection in 1/2013, s/p adjuvant chemotherapy and adjuvant chest wall radiotherapy, Completed 10-year of endocrine therapy with Letrozole in 7/2023. She developed recurrent disease with a lesion in T6 in 2/2024. CT guided biopsy on 4/17/24 showed metastatic carcinoma consistent from breast primary. She saw Dr. Anaya and received palliative RT 2000cGy to T6 lesion and completed RT on 5/22/24. She took Xeloda for 3 months and did not tolerate. Xeloda was discontinued. She had repeat PET/CT in 9/16/24 which showed interval resolution of uptake in T6 but new uptake in L1 and new pleural effusion. She completed XRT 5 fractions on 10/1/24 -10/7/24. She has been taking Letrozole. PET/CT on 1/7/25 showed several new foci of pathologic FDG uptake in bones, suspicious for biologic tumor activity. She started weekly Taxol, 2 weeks on and 1 week off on 1/31/25, s/p 2 cycles and tolerated.    4/3/25 Manoj is here for follow up visit. Her spouse is with her. She has h/o bilateral invasive lobular carcinoma of the breast, ER/NH positive and HER2/deana negative, s/p bilateral mastectomy, bilateral sentinel lymph node biopsy, and right axillary lymph node dissection in 1/2013, s/p adjuvant chemotherapy and adjuvant chest wall radiotherapy, Completed 10-year of endocrine therapy with Letrozole in 7/2023. She developed recurrent disease with a lesion in T6 in 2/2024. CT guided biopsy on 4/17/24 showed metastatic carcinoma consistent from breast primary. She saw Dr. Anaya and received palliative RT 2000cGy to T6 lesion and completed RT on 5/22/24. She took Xeloda for 3 months and did not tolerate. Xeloda was discontinued. She had repeat PET/CT in 9/16/24 which showed interval resolution of uptake in T6 but new uptake in L1 and new pleural effusion. She completed XRT 5 fractions on 10/1/24 -10/7/24. She was taking Letrozole. PET/CT on 1/7/25 showed several new foci of pathologic FDG uptake in bones, suspicious for biologic tumor activity. She started weekly Taxol, 2 weeks on and 1 week off on 1/31/25, s/p 3 cycles and tolerated. She feels some numbness and tingling in her hands and feet. She does not have n/v/d, mouth sore, chill or fever.

## 2025-04-03 NOTE — ASSESSMENT
[FreeTextEntry1] : Assessment and Plan: # H/O Bilateral invasive lobular carcinoma of the breast, ER/KS positive and HER2/deana negative, status post bilateral mastectomy, bilateral sentinel lymph node biopsy, and right axillary lymph node dissection, status post adjuvant chemotherapy and adjuvant chest wall radiotherapy, Completed 10-year of endocrine therapy with Letrozole in 7/2023.  - S/P b/l mastectomy. There is no palpable abnormality.  # Recurrent breast cancer(ER/KS/Her 2 negative Biopsy proven) with T6 vertebral body lesion S/P XRT(5/24) now presenting with new L1 lesion S/P XRT  9.24 - MRI spine on 3/11/24 showed a 2.7 cm lesion within the left aspect of the T6 vertebral body and 7 mm marrow replacing lesion within the T11 vertebral body.  - PET/CT on 3/28/24 showed Intense pathologic FDG uptake (SUV 9.3) coregistering with sclerotic lesion on the left side of T6 suspicious for biologic tumor activity. No other sites of abnormal FDG uptake.  - CT guided core bx of the lesion in T6 vertebral body on 4/17/24 showed metastatic carcinoma, favor breast primary, ER/KS negative. Her2 is negative.  - S/P palliative RT 2000cGy to T6 lesion and completed RT on 5/22/24. - She took Xeloda for 3 months, 1500 mg twice a day, one week on and one week off. Due to frequent diarrhea, dose reduced to Xeloda 1000 mg q12h, 1 week on and 1 week off.  She developed multiple side effects and Xeloda was discontinued.  -- PET/CT in 9/16/24 showed interval resolution of uptake in T6 and New uptake in L1 and new pleural effusion.  -- MRI of lumbar spine 9/25/24 showed infiltrative enhancing bone marrow signal abnormality within the T11 and L1 vertebral bodies most compatible with osseous metastatic disease. Mild compression deformity of L1, likely pathologic. -- She received XRT 5 fractions 2000cGy to Vertebrae, Lumbar on 10/1/24 - 10/7/24. -- Recurrent breast cancer in the spine is triple negative. She had palliative RT x 2. Currently, there is no evidence of visceral disease or other site disease. Since her initial breast cancer was hormone receptor and bone met biopsy could have inadequate IHC staining for hormone receptor, she was given Letrozole.  -- Repeat PET/CT on 1/7/25 showed several new foci of pathologic FDG uptake in bones, suspicious for biologic tumor activity. Interval resolution of previously noted small right pleural effusion. -- In light of progression of disease, she switched to Taxol 80 mg weekly, 2 weeks on and one week off. Will proceed with the 4th cycle.  -- Will refer her repeat PET/CT to evaluate treatment response after this cycle. A referral was [facility]. -- Start X-Geva injection monthly on 3/18. Dental clearance was done.  -- Mild elevated AST and ALT. Improving. Will continue monitoring.  -- Order blood work: CBC, BMP, LFT, CEA and .  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 She saw neurosurgeon.   #Low back pain.  -- NSAIDs as needed.  -- F/U with Neurosurgery. She saw Dr Taylor and is scheduled for L1 kyphoplasty on 4/11/25.  # H/O elevated serum calcium. MM panel showed no evidence of M-spike. Serum light chain ration is normal. Repeat serum calcium is normal and PTH is also normal.

## 2025-04-03 NOTE — ASSESSMENT
[FreeTextEntry1] : Assessment and Plan: # H/O Bilateral invasive lobular carcinoma of the breast, ER/AK positive and HER2/deana negative, status post bilateral mastectomy, bilateral sentinel lymph node biopsy, and right axillary lymph node dissection, status post adjuvant chemotherapy and adjuvant chest wall radiotherapy, Completed 10-year of endocrine therapy with Letrozole in 7/2023.  - S/P b/l mastectomy. There is no palpable abnormality.  # Recurrent breast cancer(ER/AK/Her 2 negative Biopsy proven) with T6 vertebral body lesion S/P XRT(5/24) now presenting with new L1 lesion S/P XRT  9.24 - MRI spine on 3/11/24 showed a 2.7 cm lesion within the left aspect of the T6 vertebral body and 7 mm marrow replacing lesion within the T11 vertebral body.  - PET/CT on 3/28/24 showed Intense pathologic FDG uptake (SUV 9.3) coregistering with sclerotic lesion on the left side of T6 suspicious for biologic tumor activity. No other sites of abnormal FDG uptake.  - CT guided core bx of the lesion in T6 vertebral body on 4/17/24 showed metastatic carcinoma, favor breast primary, ER/AK negative. Her2 is negative.  - S/P palliative RT 2000cGy to T6 lesion and completed RT on 5/22/24. - She took Xeloda for 3 months, 1500 mg twice a day, one week on and one week off. Due to frequent diarrhea, dose reduced to Xeloda 1000 mg q12h, 1 week on and 1 week off.  She developed multiple side effects and Xeloda was discontinued.  -- PET/CT in 9/16/24 showed interval resolution of uptake in T6 and New uptake in L1 and new pleural effusion.  -- MRI of lumbar spine 9/25/24 showed infiltrative enhancing bone marrow signal abnormality within the T11 and L1 vertebral bodies most compatible with osseous metastatic disease. Mild compression deformity of L1, likely pathologic. -- She received XRT 5 fractions 2000cGy to Vertebrae, Lumbar on 10/1/24 - 10/7/24. -- Recurrent breast cancer in the spine is triple negative. She had palliative RT x 2. Currently, there is no evidence of visceral disease or other site disease. Since her initial breast cancer was hormone receptor and bone met biopsy could have inadequate IHC staining for hormone receptor, she was given Letrozole.  -- Repeat PET/CT on 1/7/25 showed several new foci of pathologic FDG uptake in bones, suspicious for biologic tumor activity. Interval resolution of previously noted small right pleural effusion. -- In light of progression of disease, she switched to Taxol 80 mg weekly, 2 weeks on and one week off. Will proceed with the 4th cycle.  -- Will refer her repeat PET/CT to evaluate treatment response after this cycle. A referral was [facility]. -- Start X-Geva injection monthly on 3/18. Dental clearance was done.  -- Mild elevated AST and ALT. Improving. Will continue monitoring.  -- Order blood work: CBC, BMP, LFT, CEA and .  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 She saw neurosurgeon.   #Low back pain.  -- NSAIDs as needed.  -- F/U with Neurosurgery. She saw Dr Taylor and is scheduled for L1 kyphoplasty on 4/11/25.  # H/O elevated serum calcium. MM panel showed no evidence of M-spike. Serum light chain ration is normal. Repeat serum calcium is normal and PTH is also normal.

## 2025-04-09 NOTE — ASSESSMENT
[FreeTextEntry1] : Assessment and Plan: # H/O Bilateral invasive lobular carcinoma of the breast, ER/[place] positive and HER2/deana negative, status post bilateral mastectomy, bilateral sentinel lymph node biopsy, and right axillary lymph node dissection, status post adjuvant chemotherapy and adjuvant chest wall radiotherapy, Completed 10-year of endocrine therapy with Letrozole in 7/2023.  - S/P b/l mastectomy. There is no palpable abnormality.  # Recurrent breast cancer(ER/NC/Her 2 negative Biopsy proven) with T6 vertebral body lesion S/P XRT(5/24) now presenting with new L1 lesion S/P XRT  9.24 - MRI spine on 3/11/24 showed a 2.7 cm lesion within the left aspect of the T6 vertebral body and 7 mm marrow replacing lesion within the T11 vertebral body.  - PET/CT on 3/28/24 showed Intense pathologic FDG uptake (SUV 9.3) coregistering with sclerotic lesion on the left side of T6 suspicious for biologic tumor activity. No other sites of abnormal FDG uptake.  - CT guided core bx of the lesion in T6 vertebral body on 4/17/24 showed metastatic carcinoma, favor breast primary, ER/NC negative. Her2 is negative.  - S/P palliative RT 2000cGy to T6 lesion and completed RT on 5/22/24. - She took Xeloda for 3 months, 1500 mg twice a day, one week on and one week off. Due to frequent diarrhea, dose reduced to Xeloda 1000 mg q12h, 1 week on and 1 week off.  She developed multiple side effects and Xeloda was discontinued.  -- PET/CT in 9/16/24 showed interval resolution of uptake in T6 and New uptake in L1 and new pleural effusion.  -- MRI of lumbar spine 9/25/24 showed infiltrative enhancing bone marrow signal abnormality within the T11 and L1 vertebral bodies most compatible with osseous metastatic disease. Mild compression deformity of L1, likely pathologic. -- She received XRT 5 fractions 2000cGy to Vertebrae, Lumbar on 10/1/24 - 10/7/24. -- Recurrent breast cancer in the spine is triple negative. She had palliative RT x 2. Currently, there is no evidence of visceral disease or other site disease. Since her initial breast cancer was hormone receptor and bone met biopsy could have inadequate IHC staining for hormone receptor, she was given Letrozole.  -- Repeat PET/CT on 1/7/25 showed several new foci of pathologic FDG uptake in bones, suspicious for biologic tumor activity. Interval resolution of previously noted small right pleural effusion. -- In light of progression of disease, she switched to Taxol 80 mg weekly, 2 weeks on and one week off. Will proceed with the 4th cycle.  -- Will refer her repeat PET/CT to evaluate treatment response after this cycle. A referral was [facility]. -- Start X-Geva injection monthly on 3/18. Dental clearance was done.  -- Mild elevated AST and ALT. Improving. Will continue monitoring.  -- Order blood work: CBC, BMP, LFT, CEA and .  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 She saw neurosurgeon.   #Low back pain.  -- NSAIDs as needed.  -- F/U with Neurosurgery. She saw Dr Taylor and is scheduled for L1 kyphoplasty on 4/11/25.  # H/O elevated serum calcium. MM panel showed no evidence of M-spike. Serum light chain ration is normal. Repeat serum calcium is normal and PTH is also normal.

## 2025-04-09 NOTE — HISTORY OF PRESENT ILLNESS
[de-identified] : This 68-year-old female is here today for a followup visit.  She has a history of bilateral invasive lobular carcinoma, stage IIIA (pT1c N2a M0) in the right breast, and stage IA (pT1b N0 M0) in the left breast.  Both sided breast cancer were ER/PA positive and HER2/deana negative.  She had bilateral mastectomy, bilateral sentinel lymph node biopsy, and right axillary lymph node dissection at [facility] on 01/08/2013.  She received adjuvant chemotherapy with Adriamycin and Cytoxan followed by paclitaxel.  She also received post mastectomy adjuvant radiotherapy to right side chest wall.  She has been on adjuvant endocrine therapy with Femara since 7/2013  and has been tolerating well.  She had genetic test for BRCA mutations.  She is negative for BRCA1/2 mutations.  In 10/2014, she had a bone density at [facility], which showed osteopenia. Her latest bone density was done 7/21/16 which showed osteopenia. MRI of breasts was done on 1/30/17 which showed no MR evidence of malignancy in either breast. Recommendation: Unless otherwise indicated by clinical findings, annual screening mammography recommended. This can be alternated at 6 month intervals with bilateral screening breast MRI.\par   She has been taking calcium and vitamin D supplements. Latest colonoscopy 11/2016 pt states it was negative. Saw Dr. Garcia 1/2017.\par  She saw Dr. Rankin recently. She is scheduled to have a revision of her lower abdominal fat necrosis on 1/2018.\par  \par   [de-identified] : 6/8/18: She stopped letrozole few months ago as she was found to be in Afib by her cardiologist on Pre-op clearance for abdominal fat necrosis. However her cardiologist () told her that you can resume Letrozole. She had surgery for abdominal fat necrosis and she is going for surgery for incisional hernia by .   12/13/18: The patient is here for followup visit. She has been taking Letrozole daily (since 7/2013) and tolerating well. She had hernia repair surgery. She complains some pain in her knees. She had MRI breast in 1 2017. There was no suspicious finding.  6/14/19; The patient is here for followup visit. She has been taking Letrozole daily (since 7/2013) and tolerating well. She complains some pain in her knees. She does not have other new complains. She has mild elevated calcium. PHT was normal.  12/13/2019 :  The patient is here for follow up. She has been on letrozole daily and tolerating well. She had a bone density 12/2/109 which showed osteopenia of femur and hip. She is not taking calcium and vit D since her last calcium was elevated 10.7. She had a recent US breasts 12/2019 for breast pain which was negative. She was seen by her cardiologist and had blood work in the summer. She was told that she has severe anemia. She started her on iron pills. She denies any vaginal bleeding or rectal bleeding  6/12/2020: The patient is here for follow up. She had bilateral invasive lobular carcinoma of the breast, ER/TN positive and HER2/deana negative, status post bilateral mastectomy, bilateral sentinel lymph node biopsy, and right axillary lymph node dissection in 1/2013, status post adjuvant chemotherapy and adjuvant chest wall radiotherapy, currently on adjuvant endocrine therapy with letrozole and tolerating well. She had a bone density 12/2/19 which showed osteopenia of femur and hip. She is not taking calcium and vit D since her last calcium was elevated 10.7. She had a recent US breasts 12/2019 for breast pain which was negative. She does not have new complains.  12/02/2020: MANOJ is here for follow up visit for bilateral invasive lobular carcinoma of the breast, ER/TN positive and HER2/deana negative, status post bilateral mastectomy, bilateral sentinel lymph node biopsy, and right axillary lymph node dissection in 1/2013, status post adjuvant chemotherapy and adjuvant chest wall radiotherapy, currently on adjuvant endocrine therapy with letrozole and tolerating well besides mild arthralgia. She had a bone density 12/2/19 which showed osteopenia of femur and hip. She continues on Vitamin D daily. She denies any new breast symptoms at this time. In addition, patient was diagnosed with COVID in 3/2020.   06/02/2021: MANOJ is here for follow up visit for bilateral invasive lobular carcinoma of the breast, ER/TN positive and HER2/deana negative, status post bilateral mastectomy, bilateral sentinel lymph node biopsy, and right axillary lymph node dissection in 1/2013, status post adjuvant chemotherapy and adjuvant chest wall radiotherapy, currently on adjuvant endocrine therapy with letrozole since 7/2013 and tolerating well besides mild arthralgia. She had a bone density 12/2/19 which showed osteopenia of femur and hip. She continues on Vitamin D daily. She denies any new breast symptoms at this time. she had right breast skin tag biopsied by dermatologist; which was benign.   1/5/22 MANOJ is here for follow up visit for bilateral invasive lobular carcinoma of the breast, ER/TN positive and HER2/deana negative, status post bilateral mastectomy, bilateral sentinel lymph node biopsy, and right axillary lymph node dissection in 1/2013, status post adjuvant chemotherapy and adjuvant chest wall radiotherapy, currently on adjuvant endocrine therapy with letrozole since 7/2013 and tolerating well besides mild arthralgia. She had a bone density 12/7/21 which showed worsening osteopenia of femur and hip, T score -2.4. She is not complaint with Vitamin D, does not take calcium due to mildly elevated Calcium. She denies any new breast symptoms at this time. She was experiencing lower abdominal and pelvic pain in Sept had US which was unremarkable. In addition, had Thyroid US due to hypercalcemia which was unremarkable, In 8/2021 TSH 2.36, Calcium 10, and PTH was 78.   7/21/22: Manoj is here for follow up visit for bilateral invasive lobular carcinoma of the breast, ER/TN positive and HER2/deana negative, status post bilateral mastectomy, bilateral sentinel lymph node biopsy, and right axillary lymph node dissection in 1/2013, status post adjuvant chemotherapy and adjuvant chest wall radiotherapy, currently on adjuvant endocrine therapy with letrozole since 7/2013 and tolerating well besides mild arthralgia. She had a bone density 12/7/21 which showed worsening osteopenia of femur and hip, T score -2.4. She takes Vitamin D but not calcium because her calcium has been mildly elevated. She was found to have large hiatal hernia and she has had on and off diarrhea for long time. She has been seen by Dr. Borjas.   01/26/2023 Mnaoj is here for follow up visit for bilateral invasive lobular carcinoma of the breast, ER/TN positive and HER2/deana negative, status post bilateral mastectomy, bilateral sentinel lymph node biopsy, and right axillary lymph node dissection in 1/2013, status post adjuvant chemotherapy and adjuvant chest wall radiotherapy, currently on adjuvant endocrine therapy with letrozole since 7/2013 and tolerating well besides mild arthralgia. She had a bone density 12/7/21 which showed worsening osteopenia of femur and hip, T score -2.4. She takes Vitamin D but not calcium because her calcium has been mildly elevated. She was found to have large hiatal hernia and surgery on 11/21/22. Her Hb drooped to 9.5 g/dl after surgery. She was found to have high serum calcium but repeat calcium was normal. PTH was also within normal limit.  7/20/23 Manoj is here for follow up visit for bilateral invasive lobular carcinoma of the breast, ER/TN positive and HER2/deana negative, status post bilateral mastectomy, bilateral sentinel lymph node biopsy, and right axillary lymph node dissection in 1/2013, status post adjuvant chemotherapy and adjuvant chest wall radiotherapy, currently on adjuvant endocrine therapy with letrozole since 7/2013 and tolerating well besides mild arthralgia. She had a bone density 12/7/21 which showed worsening osteopenia of femur and hip, T score -2.4. She takes Vitamin D but not calcium because her calcium has been mildly elevated. Repeat serum calcium normal, MM panel normal. She was found to have large hiatal hernia and surgery on 11/21/22. Her Hb drooped to 9.5 g/dl after surgery, now normal. After hernia repair she was having symptoms consistent with vagal nerve mediated gastroparesis, she then had endoscopic pyloromyotomy, symptoms resolved.   1/25/2024: Manoj is here for follow up visit for bilateral invasive lobular carcinoma of the breast, ER/TN positive and HER2/deana negative, status post bilateral mastectomy, bilateral sentinel lymph node biopsy, and right axillary lymph node dissection in 1/2013, status post adjuvant chemotherapy and adjuvant chest wall radiotherapy, currently on adjuvant endocrine therapy with letrozole since 7/2013 and tolerating well besides mild arthralgia. She Completed 10 years treatment of Letrozole in 7/2023. She had a bone density 12/8/23 Showed:         ----------------------------------------------------------------- AP Spine (L1-L4)         0.998   -0.4      1.8     Normal Femoral Neck (Left) 0.552   -2.7     -0.7     Osteoporosis Total Hip (Left) 0.676   -2.2     -0.6     Osteopenia -which showed Osteoporosis of femur Neck and Osteopenia in the Hip. She takes Vitamin D.  After hernia repair, she was having symptoms consistent with vagal nerve mediated gastroparesis, she then had endoscopic pyloromyotomy, symptoms resolved.  She starts feeling better now.   3/29/24: Manoj is here for follow up visit. She has h/o bilateral invasive lobular carcinoma of the breast, ER/TN positive and HER2/deana negative, status post bilateral mastectomy, bilateral sentinel lymph node biopsy, and right axillary lymph node dissection in 1/2013, status post adjuvant chemotherapy and adjuvant chest wall radiotherapy, currently on adjuvant endocrine therapy with letrozole since 7/2013. She had CT AP in 2/2024 for abdominal pain which incidentally showed a lesion in T6. She had MRI spine on 3/11/24 which showed a 2.7 cm lesion within the left aspect of the T6 vertebral body and 7 mm marrow replacing lesion within the T11 vertebral body.  PET/CT on 3/28/24 showed Intense pathologic FDG uptake (SUV 9.3) coregistering with sclerotic lesion on the left side of T6 suspicious for biologic tumor activity. No other sites of abnormal FDG uptake. She is here today to discuss further management plan.  4/29/24: Manoj is here for follow up visit. She has h/o bilateral invasive lobular carcinoma of the breast, ER/TN positive and HER2/deana negative, status post bilateral mastectomy, bilateral sentinel lymph node biopsy, and right axillary lymph node dissection in 1/2013, status post adjuvant chemotherapy and adjuvant chest wall radiotherapy, Completed 10-year of endocrine therapy with Letrozole in 7/2023. She had CT AP in 2/2024 for abdominal pain which incidentally showed a lesion in T6. She had MRI spine on 3/11/24 which showed a 2.7 cm lesion within the left aspect of the T6 vertebral body and 7 mm marrow replacing lesion within the T11 vertebral body.  PET/CT on 3/28/24 showed Intense pathologic FDG uptake (SUV 9.3) coregistering with sclerotic lesion on the left side of T6 suspicious for biologic tumor activity. No other sites of abnormal FDG uptake. She had CT guided biopsy on 4/17/24 and is here today to discuss the result.     5/30/24: Manoj is here for follow up visit. She has h/o bilateral invasive lobular carcinoma of the breast, ER/TN positive and HER2/deana negative, status post bilateral mastectomy, bilateral sentinel lymph node biopsy, and right axillary lymph node dissection in 1/2013, status post adjuvant chemotherapy and adjuvant chest wall radiotherapy, Completed 10-year of endocrine therapy with Letrozole in 7/2023. She had CT AP in 2/2024 for abdominal pain which incidentally showed a lesion in T6. She had MRI spine on 3/11/24 which showed a 2.7 cm lesion within the left aspect of the T6 vertebral body and 7 mm marrow replacing lesion within the T11 vertebral body.  PET/CT on 3/28/24 showed Intense pathologic FDG uptake (SUV 9.3) coregistering with sclerotic lesion on the left side of T6 suspicious for biologic tumor activity.  She had CT guided biopsy on 4/17/24 which showed metastatic carcinoma consistent from breast primary. She saw Dr. Anaya and received palliative RT 2000cGy to T6 lesion and completed RT on 5/22/24.  6/27/24: Manoj is here for follow up visit. She has h/o bilateral invasive lobular carcinoma of the breast, ER/TN positive and HER2/deana negative, status post bilateral mastectomy, bilateral sentinel lymph node biopsy, and right axillary lymph node dissection in 1/2013, status post adjuvant chemotherapy and adjuvant chest wall radiotherapy, Completed 10-year of endocrine therapy with Letrozole in 7/2023. She had CT AP in 2/2024 for abdominal pain which incidentally showed a lesion in T6. She had MRI spine on 3/11/24 which showed a 2.7 cm lesion within the left aspect of the T6 vertebral body and 7 mm marrow replacing lesion within the T11 vertebral body.  PET/CT on 3/28/24 showed Intense pathologic FDG uptake (SUV 9.3) coregistering with sclerotic lesion on the left side of T6 suspicious for biologic tumor activity.  She had CT guided biopsy on 4/17/24 which showed metastatic carcinoma consistent from breast primary. She saw Dr. Anaya and received palliative RT 2000cGy to T6 lesion and completed RT on 5/22/24. She started on Xeloda and had 1st week treatment. She had loose stool but otherwise tolerated well.   08/01/24: bilateral sentinel lymph node biopsy, and right axillary lymph node dissection in 1/2013, status post adjuvant chemotherapy and adjuvant chest wall radiotherapy, Completed 10-year of endocrine therapy with Letrozole in 7/2023. She had CT AP in 2/2024 for abdominal pain which incidentally showed a lesion in T6. She had MRI spine on 3/11/24 which showed a 2.7 cm lesion within the left aspect of the T6 vertebral body and 7 mm marrow replacing lesion within the T11 vertebral body.  PET/CT on 3/28/24 showed Intense pathologic FDG uptake (SUV 9.3) coregistering with sclerotic lesion on the left side of T6 suspicious for biologic tumor activity.  She had CT guided biopsy on 4/17/24 which showed metastatic carcinoma consistent from breast primary. She saw Dr. Anaya and received palliative RT 2000cGy to T6 lesion and completed RT on 5/22/24. She started on Xeloda and had 1st week of June 2024. She is complaining of persistence diarrhea despite Imodium intake, currently on Xeloda 3tabs every 12 hrs for one week on and one week off.   8/29/24: Manoj is here for follow up visit. She has h/o bilateral invasive lobular carcinoma of the breast, ER/TN positive and HER2/deana negative, status post bilateral mastectomy, bilateral sentinel lymph node biopsy, and right axillary lymph node dissection in 1/2013, status post adjuvant chemotherapy and adjuvant chest wall radiotherapy, Completed 10-year of endocrine therapy with Letrozole in 7/2023. She had CT AP in 2/2024 for abdominal pain which incidentally showed a lesion in T6. She had MRI spine on 3/11/24 which showed a 2.7 cm lesion within the left aspect of the T6 vertebral body and 7 mm marrow replacing lesion within the T11 vertebral body.  PET/CT on 3/28/24 showed Intense pathologic FDG uptake (SUV 9.3) coregistering with sclerotic lesion on the left side of T6 suspicious for biologic tumor activity.  She had CT guided biopsy on 4/17/24 which showed metastatic carcinoma consistent from breast primary. She saw Dr. Anaya and received palliative RT 2000cGy to T6 lesion and completed RT on 5/22/24. She has been on Xeloda and did not tolerate well. She complains diarrhea, weight loss and eye irritation.   10/10/24 Manoj is here for follow up visit. She has h/o bilateral invasive lobular carcinoma of the breast, ER/TN positive and HER2/deana negative, status post bilateral mastectomy, bilateral sentinel lymph node biopsy, and right axillary lymph node dissection in 1/2013, status post adjuvant chemotherapy and adjuvant chest wall radiotherapy, Completed 10-year of endocrine therapy with Letrozole in 7/2023. She had CT AP in 2/2024 for abdominal pain which incidentally showed a lesion in T6. She had MRI spine on 3/11/24 which showed a 2.7 cm lesion within the left aspect of the T6 vertebral body and 7 mm marrow replacing lesion within the T11 vertebral body.  PET/CT on 3/28/24 showed Intense pathologic FDG uptake (SUV 9.3) coregistering with sclerotic lesion on the left side of T6 suspicious for biologic tumor activity.  She had CT guided biopsy on 4/17/24 which showed metastatic carcinoma consistent from breast primary. She saw Dr. Anaya and received palliative RT 2000cGy to T6 lesion and completed RT on 5/22/24. She took Xeloda for 3 months and did not tolerate well. Xeloda was discontinued. She had a PET scan in 9.24 which showed interval resolution of uptake in T6 and New uptake in L1 and new pleural effussion. She completed XRT 5 fractions in 9.24.  She is here for follow up.  11/11/24 Manoj is here for follow up visit. She has h/o bilateral invasive lobular carcinoma of the breast, ER/TN positive and HER2/deana negative, status post bilateral mastectomy, bilateral sentinel lymph node biopsy, and right axillary lymph node dissection in 1/2013, status post adjuvant chemotherapy and adjuvant chest wall radiotherapy, Completed 10-year of endocrine therapy with Letrozole in 7/2023. She developed recurrent disease with a lesion in T6 in 2/2024. CT guided biopsy on 4/17/24 showed metastatic carcinoma consistent from breast primary. She saw Dr. Anaya and received palliative RT 2000cGy to T6 lesion and completed RT on 5/22/24. She took Xeloda for 3 months and did not tolerate well. Xeloda was discontinued. She had repeat PET/CT in 9.24 which showed interval resolution of uptake in T6 but new uptake in L1 and new pleural effusion. She completed XRT 5 fractions. She complains low back pain.   12/30/24 Manoj is here for follow up visit. Her spouse is with her. She has h/o bilateral invasive lobular carcinoma of the breast, ER/TN positive and HER2/deana negative, s/p bilateral mastectomy, bilateral sentinel lymph node biopsy, and right axillary lymph node dissection in 1/2013, s/p adjuvant chemotherapy and adjuvant chest wall radiotherapy, Completed 10-year of endocrine therapy with Letrozole in 7/2023. She developed recurrent disease with a lesion in T6 in 2/2024. CT guided biopsy on 4/17/24 showed metastatic carcinoma consistent from breast primary. She saw Dr. Anaya and received palliative RT 2000cGy to T6 lesion and completed RT on 5/22/24. She took Xeloda for 3 months and did not tolerate well. Xeloda was discontinued. She had repeat PET/CT in 9/16/24 which showed interval resolution of uptake in T6 but new uptake in L1 and new pleural effusion. MR Lumbar spine on 9/25/24 showed Infiltrative enhancing bone marrow signal abnormality within the T11 and L1 vertebral bodies most compatible with osseous metastatic disease. Mild compression deformity of L1, likely pathologic. Multilevel degenerative changes as described. She completed XRT 5 fractions on 10/1/24 -10/7/24. She has a history of fall on 9/2024. CT chest 12/12/24 showed no pleural effusion. She complains low back pain, taking Aleeve as needed with relief.  1/14/25: Manoj is here for follow up visit. Her spouse is with her. She has h/o bilateral invasive lobular carcinoma of the breast, ER/TN positive and HER2/deana negative, s/p bilateral mastectomy, bilateral sentinel lymph node biopsy, and right axillary lymph node dissection in 1/2013, s/p adjuvant chemotherapy and adjuvant chest wall radiotherapy, Completed 10-year of endocrine therapy with Letrozole in 7/2023. She developed recurrent disease with a lesion in T6 in 2/2024. CT guided biopsy on 4/17/24 showed metastatic carcinoma consistent from breast primary. She saw Dr. Anaya and received palliative RT 2000cGy to T6 lesion and completed RT on 5/22/24. She took Xeloda for 3 months and did not tolerate. Xeloda was discontinued. She had repeat PET/CT in 9/16/24 which showed interval resolution of uptake in T6 but new uptake in L1 and new pleural effusion.  She completed XRT 5 fractions on 10/1/24 -10/7/24. She has been taking Letrozole. She feels some aches and pains.   2/20/25 Manoj is here for follow up visit. Her spouse is with her. She has h/o bilateral invasive lobular carcinoma of the breast, ER/TN positive and HER2/deana negative, s/p bilateral mastectomy, bilateral sentinel lymph node biopsy, and right axillary lymph node dissection in 1/2013, s/p adjuvant chemotherapy and adjuvant chest wall radiotherapy, Completed 10-year of endocrine therapy with Letrozole in 7/2023. She developed recurrent disease with a lesion in T6 in 2/2024. CT guided biopsy on 4/17/24 showed metastatic carcinoma consistent from breast primary. She saw Dr. Anaya and received palliative RT 2000cGy to T6 lesion and completed RT on 5/22/24. She took Xeloda for 3 months and did not tolerate. Xeloda was discontinued. She had repeat PET/CT in 9/16/24 which showed interval resolution of uptake in T6 but new uptake in L1 and new pleural effusion. She completed XRT 5 fractions on 10/1/24 -10/7/24. She has been taking Letrozole. PET/CT on 1/7/25 showed several new foci of pathologic FDG uptake in bones, suspicious for biologic tumor activity. Interval resolution of previously noted small right pleural effusion. She feels some aches and pains. She started weekly Taxol, 2 weeks on and 1 week off on 1/31/25, s/p cycle #1 and is tolerating well except for diarrhea yesterday, managed with Imodium. She denies nausea, vomiting, or numbness or tingling of hands/feet. She saw Dr Taylor and is planned for L1 kyphoplasty on 3/18/25.  3/13/25 Manoj is here for follow up visit. Her spouse is with her. She has h/o bilateral invasive lobular carcinoma of the breast, ER/TN positive and HER2/deana negative, s/p bilateral mastectomy, bilateral sentinel lymph node biopsy, and right axillary lymph node dissection in 1/2013, s/p adjuvant chemotherapy and adjuvant chest wall radiotherapy, Completed 10-year of endocrine therapy with Letrozole in 7/2023. She developed recurrent disease with a lesion in T6 in 2/2024. CT guided biopsy on 4/17/24 showed metastatic carcinoma consistent from breast primary. She saw Dr. Anaya and received palliative RT 2000cGy to T6 lesion and completed RT on 5/22/24. She took Xeloda for 3 months and did not tolerate. Xeloda was discontinued. She had repeat PET/CT in 9/16/24 which showed interval resolution of uptake in T6 but new uptake in L1 and new pleural effusion. She completed XRT 5 fractions on 10/1/24 -10/7/24. She has been taking Letrozole. PET/CT on 1/7/25 showed several new foci of pathologic FDG uptake in bones, suspicious for biologic tumor activity. She started weekly Taxol, 2 weeks on and 1 week off on 1/31/25, s/p 2 cycles and tolerated.    4/3/25 Manoj is here for follow up visit. Her spouse is with her. She has h/o bilateral invasive lobular carcinoma of the breast, ER/TN positive and HER2/deana negative, s/p bilateral mastectomy, bilateral sentinel lymph node biopsy, and right axillary lymph node dissection in 1/2013, s/p adjuvant chemotherapy and adjuvant chest wall radiotherapy, Completed 10-year of endocrine therapy with Letrozole in 7/2023. She developed recurrent disease with a lesion in T6 in 2/2024. CT guided biopsy on 4/17/24 showed metastatic carcinoma consistent from breast primary. She saw Dr. Anaya and received palliative RT 2000cGy to T6 lesion and completed RT on 5/22/24. She took Xeloda for 3 months and did not tolerate. Xeloda was discontinued. She had repeat PET/CT in 9/16/24 which showed interval resolution of uptake in T6 but new uptake in L1 and new pleural effusion. She completed XRT 5 fractions on 10/1/24 -10/7/24. She was taking Letrozole. PET/CT on 1/7/25 showed several new foci of pathologic FDG uptake in bones, suspicious for biologic tumor activity. She started weekly Taxol, 2 weeks on and 1 week off on 1/31/25, s/p 3 cycles and tolerated. She feels some numbness and tingling in her hands and feet. She does not have n/v/d, mouth sore, chill or fever.    4/16/25 Manoj is here for follow up visit. Her spouse is with her. She has h/o bilateral invasive lobular carcinoma of the breast, ER/TN positive and HER2/deana negative, s/p bilateral mastectomy, bilateral sentinel lymph node biopsy, and right axillary lymph node dissection in 1/2013, s/p adjuvant chemotherapy and adjuvant chest wall radiotherapy, Completed 10-year of endocrine therapy with Letrozole in 7/2023. She developed recurrent disease with a lesion in T6 in 2/2024. CT guided biopsy on 4/17/24 showed metastatic carcinoma consistent from breast primary. She saw Dr. Anaya and received palliative RT 2000cGy to T6 lesion and completed RT on 5/22/24. She took Xeloda for 3 months and did not tolerate. Xeloda was discontinued. She had repeat PET/CT in 9/16/24 which showed interval resolution of uptake in T6 but new uptake in L1 and new pleural effusion. She completed XRT 5 fractions on 10/1/24 -10/7/24. She was taking Letrozole. PET/CT on 1/7/25 showed several new foci of pathologic FDG uptake in bones, suspicious for biologic tumor activity. She started weekly Taxol, 2 weeks on and 1 week off on 1/31/25, s/p 3 cycles and tolerated. She feels some numbness and tingling in her hands and feet. She does not have n/v/d, mouth sore, chill or fever.

## 2025-04-09 NOTE — PHYSICAL EXAM
[Fully active, able to carry on all pre-disease performance without restriction] : Status 0 - Fully active, able to carry on all pre-disease performance without restriction [Normal] : affect appropriate [de-identified] :  Status post bilateral mastectomy and autologous tissue reconstruction. There is no skin lesion and no palpable axillary lymphadenopathy. [de-identified] : Lower abdominal fat necrosis. Abdominal scar present.  [de-identified] : Low back pain 2' metastatic disease.

## 2025-04-23 NOTE — ASSESSMENT
[FreeTextEntry1] : Ms. ÁLVAREZ is doing well.   Activity restrictions / precautions discussed.   Wound care discussed.   F/u in 4 weeks.   Will call barring any issues.    Arleen Porter MS PA-C Senior Physician Assistant [facility]    Rosalina Taylor MD FAANS Chair, Department of Neurosurgery [facility]

## 2025-04-23 NOTE — HISTORY OF PRESENT ILLNESS
[FreeTextEntry1] : Ms. ÁLVAREZ has a past medical history of bilateral invasive lobular carcinoma s/p bilateral mastectomy along with adjuvant chemotherapy and radiation.  Oncology: Dr. Tovar. Currently on Taxol and X-Geva.   She presents today for her first postoperative visit.  She is doing well status post L1 kyphoplasty with biopsy.  Surgery date/10/25.  Pathology was consistent with fracture.  Incision healing well. No signs of erythema, infection, or discharge.  She continues to feel some residual back pain however, symptoms have improved and are more tolerable when compared to preop.

## 2025-05-06 NOTE — HISTORY OF PRESENT ILLNESS
Medical Necessity Information: It is in your best interest to select a reason for this procedure from the list below. All of these items fulfill various CMS LCD requirements except the new and changing color options. Post-Care Instructions: I reviewed with the patient in detail post-care instructions. Patient is to wear sunprotection, and avoid picking at any of the treated lesions. Pt may apply Vaseline to crusted or scabbing areas. Detail Level: Simple Render Post Care In The Note?: yes Add 52 Modifier (Optional): no Duration Of Freeze Thaw-Cycle (Seconds): 10 Total Number Of Lesions Treated: 2 Medical Necessity Clause: This procedure was medically necessary because the lesions that were treated were: [de-identified] : This 68-year-old female is here today for a followup visit.  She has a history of bilateral invasive lobular carcinoma, stage IIIA (pT1c N2a M0) in the right breast, and stage IA (pT1b N0 M0) in the left breast.  Both sided breast cancer were ER/[place] positive and HER2/deana negative.  She had bilateral mastectomy, bilateral sentinel lymph node biopsy, and right axillary lymph node dissection at [facility] on 01/08/2013.  She received adjuvant chemotherapy with Adriamycin and Cytoxan followed by paclitaxel.  She also received post mastectomy adjuvant radiotherapy to right side chest wall.  She has been on adjuvant endocrine therapy with Femara since 7/2013  and has been tolerating well.  She had genetic test for BRCA mutations.  She is negative for BRCA1/2 mutations.  In 10/2014, she had a bone density at [facility], which showed osteopenia. Her latest bone density was done 7/21/16 which showed osteopenia. MRI of breasts was done on 1/30/17 which showed no MR evidence of malignancy in either breast. Recommendation: Unless otherwise indicated by clinical findings, annual screening mammography recommended. This can be alternated at 6 month intervals with bilateral screening breast MRI.\par   She has been taking calcium and vitamin D supplements. Latest colonoscopy 11/2016 pt states it was negative. Saw Dr. Garcia 1/2017.\par  She saw Dr. Rankin recently. She is scheduled to have a revision of her lower abdominal fat necrosis on 1/2018.\par  \par   Consent: The patient's consent was obtained including but not limited to risks of crusting, scabbing, blistering, scarring, darker or lighter pigmentary change, recurrence, incomplete removal and infection. [de-identified] : 6/8/18: She stopped letrozole few months ago as she was found to be in Afib by her cardiologist on Pre-op clearance for abdominal fat necrosis. However her cardiologist () told her that you can resume Letrozole. She had surgery for abdominal fat necrosis and she is going for surgery for incisional hernia by .   12/13/18: The patient is here for followup visit. She has been taking Letrozole daily (since 7/2013) and tolerating well. She had hernia repair surgery. She complains some pain in her knees. She had MRI breast in 1 2017. There was no suspicious finding.  6/14/19; The patient is here for followup visit. She has been taking Letrozole daily (since 7/2013) and tolerating well. She complains some pain in her knees. She does not have other new complains. She has mild elevated calcium. PHT was normal.  12/13/2019 :  The patient is here for follow up. She has been on letrozole daily and tolerating well. She had a bone density 12/2/109 which showed osteopenia of femur and hip. She is not taking calcium and vit D since her last calcium was elevated 10.7. She had a recent US breasts 12/2019 for breast pain which was negative. She was seen by her cardiologist and had blood work in the summer. She was told that she has severe anemia. She started her on iron pills. She denies any vaginal bleeding or rectal bleeding  6/12/2020: The patient is here for follow up. She had bilateral invasive lobular carcinoma of the breast, ER/SC positive and HER2/deana negative, status post bilateral mastectomy, bilateral sentinel lymph node biopsy, and right axillary lymph node dissection in 1/2013, status post adjuvant chemotherapy and adjuvant chest wall radiotherapy, currently on adjuvant endocrine therapy with letrozole and tolerating well. She had a bone density 12/2/19 which showed osteopenia of femur and hip. She is not taking calcium and vit D since her last calcium was elevated 10.7. She had a recent US breasts 12/2019 for breast pain which was negative. She does not have new complains.  12/02/2020: MANOJ is here for follow up visit for bilateral invasive lobular carcinoma of the breast, ER/SC positive and HER2/deana negative, status post bilateral mastectomy, bilateral sentinel lymph node biopsy, and right axillary lymph node dissection in 1/2013, status post adjuvant chemotherapy and adjuvant chest wall radiotherapy, currently on adjuvant endocrine therapy with letrozole and tolerating well besides mild arthralgia. She had a bone density 12/2/19 which showed osteopenia of femur and hip. She continues on Vitamin D daily. She denies any new breast symptoms at this time. In addition, patient was diagnosed with COVID in 3/2020.   06/02/2021: MANOJ is here for follow up visit for bilateral invasive lobular carcinoma of the breast, ER/SC positive and HER2/deana negative, status post bilateral mastectomy, bilateral sentinel lymph node biopsy, and right axillary lymph node dissection in 1/2013, status post adjuvant chemotherapy and adjuvant chest wall radiotherapy, currently on adjuvant endocrine therapy with letrozole since 7/2013 and tolerating well besides mild arthralgia. She had a bone density 12/2/19 which showed osteopenia of femur and hip. She continues on Vitamin D daily. She denies any new breast symptoms at this time. she had right breast skin tag biopsied by dermatologist; which was benign.   1/5/22 MANOJ is here for follow up visit for bilateral invasive lobular carcinoma of the breast, ER/SC positive and HER2/deana negative, status post bilateral mastectomy, bilateral sentinel lymph node biopsy, and right axillary lymph node dissection in 1/2013, status post adjuvant chemotherapy and adjuvant chest wall radiotherapy, currently on adjuvant endocrine therapy with letrozole since 7/2013 and tolerating well besides mild arthralgia. She had a bone density 12/7/21 which showed worsening osteopenia of femur and hip, T score -2.4. She is not complaint with Vitamin D, does not take calcium due to mildly elevated Calcium. She denies any new breast symptoms at this time. She was experiencing lower abdominal and pelvic pain in Sept had US which was unremarkable. In addition, had Thyroid US due to hypercalcemia which was unremarkable, In 8/2021 TSH 2.36, Calcium 10, and PTH was 78.   7/21/22: Manoj is here for follow up visit for bilateral invasive lobular carcinoma of the breast, ER/SC positive and HER2/deana negative, status post bilateral mastectomy, bilateral sentinel lymph node biopsy, and right axillary lymph node dissection in 1/2013, status post adjuvant chemotherapy and adjuvant chest wall radiotherapy, currently on adjuvant endocrine therapy with letrozole since 7/2013 and tolerating well besides mild arthralgia. She had a bone density 12/7/21 which showed worsening osteopenia of femur and hip, T score -2.4. She takes Vitamin D but not calcium because her calcium has been mildly elevated. She was found to have large hiatal hernia and she has had on and off diarrhea for long time. She has been seen by Dr. Borjas.   01/26/2023 Manoj is here for follow up visit for bilateral invasive lobular carcinoma of the breast, ER/SC positive and HER2/deana negative, status post bilateral mastectomy, bilateral sentinel lymph node biopsy, and right axillary lymph node dissection in 1/2013, status post adjuvant chemotherapy and adjuvant chest wall radiotherapy, currently on adjuvant endocrine therapy with letrozole since 7/2013 and tolerating well besides mild arthralgia. She had a bone density 12/7/21 which showed worsening osteopenia of femur and hip, T score -2.4. She takes Vitamin D but not calcium because her calcium has been mildly elevated. She was found to have large hiatal hernia and surgery on 11/21/22. Her Hb drooped to 9.5 g/dl after surgery. She was found to have high serum calcium but repeat calcium was normal. PTH was also within normal limit.  7/20/23 Manoj is here for follow up visit for bilateral invasive lobular carcinoma of the breast, ER/SC positive and HER2/deana negative, status post bilateral mastectomy, bilateral sentinel lymph node biopsy, and right axillary lymph node dissection in 1/2013, status post adjuvant chemotherapy and adjuvant chest wall radiotherapy, currently on adjuvant endocrine therapy with letrozole since 7/2013 and tolerating well besides mild arthralgia. She had a bone density 12/7/21 which showed worsening osteopenia of femur and hip, T score -2.4. She takes Vitamin D but not calcium because her calcium has been mildly elevated. Repeat serum calcium normal, MM panel normal. She was found to have large hiatal hernia and surgery on 11/21/22. Her Hb drooped to 9.5 g/dl after surgery, now normal. After hernia repair she was having symptoms consistent with vagal nerve mediated gastroparesis, she then had endoscopic pyloromyotomy, symptoms resolved.   1/25/2024: Manoj is here for follow up visit for bilateral invasive lobular carcinoma of the breast, ER/SC positive and HER2/deana negative, status post bilateral mastectomy, bilateral sentinel lymph node biopsy, and right axillary lymph node dissection in 1/2013, status post adjuvant chemotherapy and adjuvant chest wall radiotherapy, currently on adjuvant endocrine therapy with letrozole since 7/2013 and tolerating well besides mild arthralgia. She Completed 10 years treatment of Letrozole in 7/2023. She had a bone density 12/8/23 Showed:         ----------------------------------------------------------------- AP Spine (L1-L4)         0.998   -0.4      1.8     Normal Femoral Neck (Left) 0.552   -2.7     -0.7     Osteoporosis Total Hip (Left) 0.676   -2.2     -0.6     Osteopenia -which showed Osteoporosis of femur Neck and Osteopenia in the Hip. She takes Vitamin D.  After hernia repair, she was having symptoms consistent with vagal nerve mediated gastroparesis, she then had endoscopic pyloromyotomy, symptoms resolved.  She starts feeling better now.   3/29/24: Manoj is here for follow up visit. She has h/o bilateral invasive lobular carcinoma of the breast, ER/SC positive and HER2/deana negative, status post bilateral mastectomy, bilateral sentinel lymph node biopsy, and right axillary lymph node dissection in 1/2013, status post adjuvant chemotherapy and adjuvant chest wall radiotherapy, currently on adjuvant endocrine therapy with letrozole since 7/2013. She had CT AP in 2/2024 for abdominal pain which incidentally showed a lesion in T6. She had MRI spine on 3/11/24 which showed a 2.7 cm lesion within the left aspect of the T6 vertebral body and 7 mm marrow replacing lesion within the T11 vertebral body.  PET/CT on 3/28/24 showed Intense pathologic FDG uptake (SUV 9.3) coregistering with sclerotic lesion on the left side of T6 suspicious for biologic tumor activity. No other sites of abnormal FDG uptake. She is here today to discuss further management plan.  4/29/24: Manoj is here for follow up visit. She has h/o bilateral invasive lobular carcinoma of the breast, ER/SC positive and HER2/deana negative, status post bilateral mastectomy, bilateral sentinel lymph node biopsy, and right axillary lymph node dissection in 1/2013, status post adjuvant chemotherapy and adjuvant chest wall radiotherapy, Completed 10-year of endocrine therapy with Letrozole in 7/2023. She had CT AP in 2/2024 for abdominal pain which incidentally showed a lesion in T6. She had MRI spine on 3/11/24 which showed a 2.7 cm lesion within the left aspect of the T6 vertebral body and 7 mm marrow replacing lesion within the T11 vertebral body.  PET/CT on 3/28/24 showed Intense pathologic FDG uptake (SUV 9.3) coregistering with sclerotic lesion on the left side of T6 suspicious for biologic tumor activity. No other sites of abnormal FDG uptake. She had CT guided biopsy on 4/17/24 and is here today to discuss the result.     5/30/24: Manoj is here for follow up visit. She has h/o bilateral invasive lobular carcinoma of the breast, ER/SC positive and HER2/deana negative, status post bilateral mastectomy, bilateral sentinel lymph node biopsy, and right axillary lymph node dissection in 1/2013, status post adjuvant chemotherapy and adjuvant chest wall radiotherapy, Completed 10-year of endocrine therapy with Letrozole in 7/2023. She had CT AP in 2/2024 for abdominal pain which incidentally showed a lesion in T6. She had MRI spine on 3/11/24 which showed a 2.7 cm lesion within the left aspect of the T6 vertebral body and 7 mm marrow replacing lesion within the T11 vertebral body.  PET/CT on 3/28/24 showed Intense pathologic FDG uptake (SUV 9.3) coregistering with sclerotic lesion on the left side of T6 suspicious for biologic tumor activity.  She had CT guided biopsy on 4/17/24 which showed metastatic carcinoma consistent from breast primary. She saw Dr. Anaya and received palliative RT 2000cGy to T6 lesion and completed RT on 5/22/24.  6/27/24: Manoj is here for follow up visit. She has h/o bilateral invasive lobular carcinoma of the breast, ER/SC positive and HER2/deana negative, status post bilateral mastectomy, bilateral sentinel lymph node biopsy, and right axillary lymph node dissection in 1/2013, status post adjuvant chemotherapy and adjuvant chest wall radiotherapy, Completed 10-year of endocrine therapy with Letrozole in 7/2023. She had CT AP in 2/2024 for abdominal pain which incidentally showed a lesion in T6. She had MRI spine on 3/11/24 which showed a 2.7 cm lesion within the left aspect of the T6 vertebral body and 7 mm marrow replacing lesion within the T11 vertebral body.  PET/CT on 3/28/24 showed Intense pathologic FDG uptake (SUV 9.3) coregistering with sclerotic lesion on the left side of T6 suspicious for biologic tumor activity.  She had CT guided biopsy on 4/17/24 which showed metastatic carcinoma consistent from breast primary. She saw Dr. Anaya and received palliative RT 2000cGy to T6 lesion and completed RT on 5/22/24. She started on Xeloda and had 1st week treatment. She had loose stool but otherwise tolerated well.   08/01/24: bilateral sentinel lymph node biopsy, and right axillary lymph node dissection in 1/2013, status post adjuvant chemotherapy and adjuvant chest wall radiotherapy, Completed 10-year of endocrine therapy with Letrozole in 7/2023. She had CT AP in 2/2024 for abdominal pain which incidentally showed a lesion in T6. She had MRI spine on 3/11/24 which showed a 2.7 cm lesion within the left aspect of the T6 vertebral body and 7 mm marrow replacing lesion within the T11 vertebral body.  PET/CT on 3/28/24 showed Intense pathologic FDG uptake (SUV 9.3) coregistering with sclerotic lesion on the left side of T6 suspicious for biologic tumor activity.  She had CT guided biopsy on 4/17/24 which showed metastatic carcinoma consistent from breast primary. She saw Dr. Anaya and received palliative RT 2000cGy to T6 lesion and completed RT on 5/22/24. She started on Xeloda and had 1st week of June 2024. She is complaining of persistence diarrhea despite Imodium intake, currently on Xeloda 3tabs every 12 hrs for one week on and one week off.   8/29/24: Manoj is here for follow up visit. She has h/o bilateral invasive lobular carcinoma of the breast, ER/SC positive and HER2/deana negative, status post bilateral mastectomy, bilateral sentinel lymph node biopsy, and right axillary lymph node dissection in 1/2013, status post adjuvant chemotherapy and adjuvant chest wall radiotherapy, Completed 10-year of endocrine therapy with Letrozole in 7/2023. She had CT AP in 2/2024 for abdominal pain which incidentally showed a lesion in T6. She had MRI spine on 3/11/24 which showed a 2.7 cm lesion within the left aspect of the T6 vertebral body and 7 mm marrow replacing lesion within the T11 vertebral body.  PET/CT on 3/28/24 showed Intense pathologic FDG uptake (SUV 9.3) coregistering with sclerotic lesion on the left side of T6 suspicious for biologic tumor activity.  She had CT guided biopsy on 4/17/24 which showed metastatic carcinoma consistent from breast primary. She saw Dr. Anaya and received palliative RT 2000cGy to T6 lesion and completed RT on 5/22/24. She has been on Xeloda and did not tolerate well. She complains diarrhea, weight loss and eye irritation.   10/10/24 Manoj is here for follow up visit. She has h/o bilateral invasive lobular carcinoma of the breast, ER/SC positive and HER2/deana negative, status post bilateral mastectomy, bilateral sentinel lymph node biopsy, and right axillary lymph node dissection in 1/2013, status post adjuvant chemotherapy and adjuvant chest wall radiotherapy, Completed 10-year of endocrine therapy with Letrozole in 7/2023. She had CT AP in 2/2024 for abdominal pain which incidentally showed a lesion in T6. She had MRI spine on 3/11/24 which showed a 2.7 cm lesion within the left aspect of the T6 vertebral body and 7 mm marrow replacing lesion within the T11 vertebral body.  PET/CT on 3/28/24 showed Intense pathologic FDG uptake (SUV 9.3) coregistering with sclerotic lesion on the left side of T6 suspicious for biologic tumor activity.  She had CT guided biopsy on 4/17/24 which showed metastatic carcinoma consistent from breast primary. She saw Dr. Anaya and received palliative RT 2000cGy to T6 lesion and completed RT on 5/22/24. She took Xeloda for 3 months and did not tolerate well. Xeloda was discontinued. She had a PET scan in 9.24 which showed interval resolution of uptake in T6 and New uptake in L1 and new pleural effussion. She completed XRT 5 fractions in 9.24.  She is here for follow up.  11/11/24 Manoj is here for follow up visit. She has h/o bilateral invasive lobular carcinoma of the breast, ER/SC positive and HER2/deana negative, status post bilateral mastectomy, bilateral sentinel lymph node biopsy, and right axillary lymph node dissection in 1/2013, status post adjuvant chemotherapy and adjuvant chest wall radiotherapy, Completed 10-year of endocrine therapy with Letrozole in 7/2023. She developed recurrent disease with a lesion in T6 in 2/2024. CT guided biopsy on 4/17/24 showed metastatic carcinoma consistent from breast primary. She saw Dr. Anaya and received palliative RT 2000cGy to T6 lesion and completed RT on 5/22/24. She took Xeloda for 3 months and did not tolerate well. Xeloda was discontinued. She had repeat PET/CT in 9.24 which showed interval resolution of uptake in T6 but new uptake in L1 and new pleural effusion. She completed XRT 5 fractions. She complains low back pain.   12/30/24 Manoj is here for follow up visit. Her spouse is with her. She has h/o bilateral invasive lobular carcinoma of the breast, ER/SC positive and HER2/deana negative, s/p bilateral mastectomy, bilateral sentinel lymph node biopsy, and right axillary lymph node dissection in 1/2013, s/p adjuvant chemotherapy and adjuvant chest wall radiotherapy, Completed 10-year of endocrine therapy with Letrozole in 7/2023. She developed recurrent disease with a lesion in T6 in 2/2024. CT guided biopsy on 4/17/24 showed metastatic carcinoma consistent from breast primary. She saw Dr. Anaya and received palliative RT 2000cGy to T6 lesion and completed RT on 5/22/24. She took Xeloda for 3 months and did not tolerate well. Xeloda was discontinued. She had repeat PET/CT in 9/16/24 which showed interval resolution of uptake in T6 but new uptake in L1 and new pleural effusion. MR Lumbar spine on 9/25/24 showed Infiltrative enhancing bone marrow signal abnormality within the T11 and L1 vertebral bodies most compatible with osseous metastatic disease. Mild compression deformity of L1, likely pathologic. Multilevel degenerative changes as described. She completed XRT 5 fractions on 10/1/24 -10/7/24. She has a history of fall on 9/2024. CT chest 12/12/24 showed no pleural effusion. She complains low back pain, taking Aleeve as needed with relief.  1/14/25: Manoj is here for follow up visit. Her spouse is with her. She has h/o bilateral invasive lobular carcinoma of the breast, ER/SC positive and HER2/deana negative, s/p bilateral mastectomy, bilateral sentinel lymph node biopsy, and right axillary lymph node dissection in 1/2013, s/p adjuvant chemotherapy and adjuvant chest wall radiotherapy, Completed 10-year of endocrine therapy with Letrozole in 7/2023. She developed recurrent disease with a lesion in T6 in 2/2024. CT guided biopsy on 4/17/24 showed metastatic carcinoma consistent from breast primary. She saw Dr. Anaya and received palliative RT 2000cGy to T6 lesion and completed RT on 5/22/24. She took Xeloda for 3 months and did not tolerate. Xeloda was discontinued. She had repeat PET/CT in 9/16/24 which showed interval resolution of uptake in T6 but new uptake in L1 and new pleural effusion.  She completed XRT 5 fractions on 10/1/24 -10/7/24. She has been taking Letrozole. She feels some aches and pains.   2/20/25 Manoj is here for follow up visit. Her spouse is with her. She has h/o bilateral invasive lobular carcinoma of the breast, ER/SC positive and HER2/deana negative, s/p bilateral mastectomy, bilateral sentinel lymph node biopsy, and right axillary lymph node dissection in 1/2013, s/p adjuvant chemotherapy and adjuvant chest wall radiotherapy, Completed 10-year of endocrine therapy with Letrozole in 7/2023. She developed recurrent disease with a lesion in T6 in 2/2024. CT guided biopsy on 4/17/24 showed metastatic carcinoma consistent from breast primary. She saw Dr. Anaya and received palliative RT 2000cGy to T6 lesion and completed RT on 5/22/24. She took Xeloda for 3 months and did not tolerate. Xeloda was discontinued. She had repeat PET/CT in 9/16/24 which showed interval resolution of uptake in T6 but new uptake in L1 and new pleural effusion. She completed XRT 5 fractions on 10/1/24 -10/7/24. She has been taking Letrozole. PET/CT on 1/7/25 showed several new foci of pathologic FDG uptake in bones, suspicious for biologic tumor activity. Interval resolution of previously noted small right pleural effusion. She feels some aches and pains. She started weekly Taxol, 2 weeks on and 1 week off on 1/31/25, s/p cycle #1 and is tolerating well except for diarrhea yesterday, managed with Imodium. She denies nausea, vomiting, or numbness or tingling of hands/feet. She saw Dr Taylor and is planned for L1 kyphoplasty on 3/18/25.  3/13/25 Manoj is here for follow up visit. Her spouse is with her. She has h/o bilateral invasive lobular carcinoma of the breast, ER/SC positive and HER2/deana negative, s/p bilateral mastectomy, bilateral sentinel lymph node biopsy, and right axillary lymph node dissection in 1/2013, s/p adjuvant chemotherapy and adjuvant chest wall radiotherapy, Completed 10-year of endocrine therapy with Letrozole in 7/2023. She developed recurrent disease with a lesion in T6 in 2/2024. CT guided biopsy on 4/17/24 showed metastatic carcinoma consistent from breast primary. She saw Dr. Anaya and received palliative RT 2000cGy to T6 lesion and completed RT on 5/22/24. She took Xeloda for 3 months and did not tolerate. Xeloda was discontinued. She had repeat PET/CT in 9/16/24 which showed interval resolution of uptake in T6 but new uptake in L1 and new pleural effusion. She completed XRT 5 fractions on 10/1/24 -10/7/24. She has been taking Letrozole. PET/CT on 1/7/25 showed several new foci of pathologic FDG uptake in bones, suspicious for biologic tumor activity. She started weekly Taxol, 2 weeks on and 1 week off on 1/31/25, s/p 2 cycles and tolerated.    4/3/25 Manoj is here for follow up visit. Her spouse is with her. She has h/o bilateral invasive lobular carcinoma of the breast, ER/SC positive and HER2/deana negative, s/p bilateral mastectomy, bilateral sentinel lymph node biopsy, and right axillary lymph node dissection in 1/2013, s/p adjuvant chemotherapy and adjuvant chest wall radiotherapy, Completed 10-year of endocrine therapy with Letrozole in 7/2023. She developed recurrent disease with a lesion in T6 in 2/2024. CT guided biopsy on 4/17/24 showed metastatic carcinoma consistent from breast primary. She saw Dr. Anaya and received palliative RT 2000cGy to T6 lesion and completed RT on 5/22/24. She took Xeloda for 3 months and did not tolerate. Xeloda was discontinued. She had repeat PET/CT in 9/16/24 which showed interval resolution of uptake in T6 but new uptake in L1 and new pleural effusion. She completed XRT 5 fractions on 10/1/24 -10/7/24. She was taking Letrozole. PET/CT on 1/7/25 showed several new foci of pathologic FDG uptake in bones, suspicious for biologic tumor activity. She started weekly Taxol, 2 weeks on and 1 week off on 1/31/25, s/p 3 cycles and tolerated. She feels some numbness and tingling in her hands and feet. She does not have n/v/d, mouth sore, chill or fever.    4/16/25 Manoj is here for follow up visit. Her spouse is with her. She has h/o bilateral invasive lobular carcinoma of the breast, ER/SC positive and HER2/deana negative, s/p bilateral mastectomy, bilateral sentinel lymph node biopsy, and right axillary lymph node dissection in 1/2013, s/p adjuvant chemotherapy and adjuvant chest wall radiotherapy, Completed 10-year of endocrine therapy with Letrozole in 7/2023. She developed recurrent disease with a lesion in T6 in 2/2024. CT guided biopsy on 4/17/24 showed metastatic carcinoma consistent from breast primary. She saw Dr. Anaya and received palliative RT 2000cGy to T6 lesion and completed RT on 5/22/24. She took Xeloda for 3 months and did not tolerate. Xeloda was discontinued. She had repeat PET/CT in 9/16/24 which showed interval resolution of uptake in T6 but new uptake in L1 and new pleural effusion. She completed XRT 5 fractions on 10/1/24 -10/7/24. She was taking Letrozole. PET/CT on 1/7/25 showed several new foci of pathologic FDG uptake in bones, suspicious for biologic tumor activity. She started weekly Taxol, 2 weeks on and 1 week off on 1/31/25, s/p 3 cycles and tolerated. She feels some numbness and tingling in her hands and feet. She does not have n/v/d, mouth sore, chill or fever.    4/30/25 Manoj is here for follow up visit and chemotherapy. Her  is with her. She has h/o bilateral invasive lobular carcinoma of the breast, ER/SC positive and HER2/deana negative, s/p bilateral mastectomy, bilateral sentinel lymph node biopsy, and right axillary lymph node dissection in 1/2013, s/p adjuvant chemotherapy and adjuvant chest wall radiotherapy, Completed 10-year of endocrine therapy with Letrozole in 7/2023. She developed recurrent disease with a lesion in T6 in 2/2024. CT guided biopsy on 4/17/24 showed metastatic carcinoma consistent from breast primary. She received palliative RT 2000cGy to T6 lesion and completed RT on 5/22/24. She took Xeloda for 3 months and did not tolerate. Xeloda was discontinued. She had repeat PET/CT in 9/16/24 which showed interval resolution of uptake in T6 but new uptake in L1 and new pleural effusion. She completed XRT 5 fractions on 10/1/24 -10/7/24. She was taking Letrozole. PET/CT on 1/7/25 showed several new foci of pathologic FDG uptake in bones, suspicious for biologic tumor activity. She started weekly Taxol, 2 weeks on and 1 week off on 1/31/25, s/p 4 cycles and tolerated. She has mild neuropathy symptoms. She does not have n/v/d, mouth sore, chill or fever.      Number Of Freeze-Thaw Cycles: 2 freeze-thaw cycles

## 2025-05-06 NOTE — BEGINNING OF VISIT
[0] : 2) Feeling down, depressed, or hopeless: Not at all (0) [Never] : Never [Reviewed, no changes] : Reviewed, no changes [Date Discussed (MM/DD/YY): ___] : Discussed: [unfilled] [Abdominal Pain] : no abdominal pain [Vomiting] : no vomiting [Constipation] : no constipation

## 2025-05-06 NOTE — ASSESSMENT
[FreeTextEntry1] : Assessment and Plan: # H/O Bilateral invasive lobular carcinoma of the breast, ER/PA positive and HER2/deana negative, status post bilateral mastectomy, bilateral sentinel lymph node biopsy, and right axillary lymph node dissection, status post adjuvant chemotherapy and adjuvant chest wall radiotherapy, Completed 10-year of endocrine therapy with Letrozole in 7/2023.  - S/P b/l mastectomy. There is no palpable abnormality.  # Recurrent breast cancer(ER/PA/Her 2 negative Biopsy proven) with T6 vertebral body lesion S/P XRT(5/24) now presenting with new L1 lesion S/P XRT  9.24 - MRI spine on 3/11/24 showed a 2.7 cm lesion within the left aspect of the T6 vertebral body and 7 mm marrow replacing lesion within the T11 vertebral body.  - PET/CT on 3/28/24 showed Intense pathologic FDG uptake (SUV 9.3) coregistering with sclerotic lesion on the left side of T6 suspicious for biologic tumor activity. No other sites of abnormal FDG uptake.  - CT guided core bx of the lesion in T6 vertebral body on 4/17/24 showed metastatic carcinoma, favor breast primary, ER/PA negative. Her2 is negative.  - S/P palliative RT 2000cGy to T6 lesion and completed RT on 5/22/24. - She took Xeloda for 3 months, 1500 mg twice a day, one week on and one week off. Due to frequent diarrhea, dose reduced to Xeloda 1000 mg q12h, 1 week on and 1 week off.  She developed multiple side effects and Xeloda was discontinued.  -- PET/CT in 9/16/24 showed interval resolution of uptake in T6 and New uptake in L1 and new pleural effusion.  -- MRI of lumbar spine 9/25/24 showed infiltrative enhancing bone marrow signal abnormality within the T11 and L1 vertebral bodies most compatible with osseous metastatic disease. Mild compression deformity of L1, likely pathologic. -- She received XRT 5 fractions 2000cGy to Vertebrae, Lumbar on 10/1/24 - 10/7/24. -- Recurrent breast cancer in the spine is triple negative. She had palliative RT x 2. There was no evidence of visceral disease or other site disease. Since her initial breast cancer was hormone receptor and bone met biopsy could have inadequate IHC staining for hormone receptor, she was given Letrozole.  -- Repeat PET/CT on 1/7/25 showed several new foci of pathologic FDG uptake in bones, suspicious for biologic tumor activity. Interval resolution of previously noted small right pleural effusion. -- In light of progression of disease, she switched to Taxol 80 mg weekly, 2 weeks on and one week off. S/P 4 cycles. Repeat PET/CT on 4/23/25 showed stable bony mets with SUV values trending down in some areas and trending up slightly in the other areas. There is no visceral mets.  -- Will continue weekly Taxol and proceed with the 5th cycle.  -- Continue X-Geva injection monthly. Dental clearance was done.  -- Mild elevated AST and ALT. Resolved. Will continue monitoring.  -- Order blood work: CBC, BMP, LFT, CEA and .  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 She saw neurosurgeon.   #Low back pain.  -- S/P L1 kyphoplasty on 4/11/25. Pain has reduced.  -- NSAIDs as needed.  -- F/U with Neurosurgery, Dr Taylor as scheduled.  # H/O elevated serum calcium. MM panel showed no evidence of M-spike. Serum light chain ration is normal. Repeat serum calcium is normal and PTH is also normal.

## 2025-05-06 NOTE — ASSESSMENT
[FreeTextEntry1] : Assessment and Plan: # H/O Bilateral invasive lobular carcinoma of the breast, ER/[place] positive and HER2/deana negative, status post bilateral mastectomy, bilateral sentinel lymph node biopsy, and right axillary lymph node dissection, status post adjuvant chemotherapy and adjuvant chest wall radiotherapy, Completed 10-year of endocrine therapy with Letrozole in 7/2023.  - S/P b/l mastectomy. There is no palpable abnormality.  # Recurrent breast cancer(ER/[place]/Her 2 negative Biopsy proven) with T6 vertebral body lesion S/P XRT(5/24) now presenting with new L1 lesion S/P XRT  9.24 - MRI spine on 3/11/24 showed a 2.7 cm lesion within the left aspect of the T6 vertebral body and 7 mm marrow replacing lesion within the T11 vertebral body.  - PET/CT on 3/28/24 showed Intense pathologic FDG uptake (SUV 9.3) coregistering with sclerotic lesion on the left side of T6 suspicious for biologic tumor activity. No other sites of abnormal FDG uptake.  - CT guided core bx of the lesion in T6 vertebral body on 4/17/24 showed metastatic carcinoma, favor breast primary, ER/[place] negative. Her2 is negative.  - S/P palliative RT 2000cGy to T6 lesion and completed RT on 5/22/24. - She took Xeloda for 3 months, 1500 mg twice a day, one week on and one week off. Due to frequent diarrhea, dose reduced to Xeloda 1000 mg q12h, 1 week on and 1 week off.  She developed multiple side effects and Xeloda was discontinued.  -- PET/CT in 9/16/24 showed interval resolution of uptake in T6 and New uptake in L1 and new pleural effusion.  -- MRI of lumbar spine 9/25/24 showed infiltrative enhancing bone marrow signal abnormality within the T11 and L1 vertebral bodies most compatible with osseous metastatic disease. Mild compression deformity of L1, likely pathologic. -- She received XRT 5 fractions 2000cGy to Vertebrae, Lumbar on 10/1/24 - 10/7/24. -- Recurrent breast cancer in the spine is triple negative. She had palliative RT x 2. There was no evidence of visceral disease or other site disease. Since her initial breast cancer was hormone receptor and bone met biopsy could have inadequate IHC staining for hormone receptor, she was given Letrozole.  -- Repeat PET/CT on 1/7/25 showed several new foci of pathologic FDG uptake in bones, suspicious for biologic tumor activity. Interval resolution of previously noted small right pleural effusion. -- In light of progression of disease, she switched to Taxol 80 mg weekly, 2 weeks on and one week off. S/P 4 cycles. Repeat PET/CT on 4/23/25 showed stable bony mets with SUV values trending down in some areas and trending up slightly in the other areas. There is no visceral mets.  -- Will continue weekly Taxol and proceed with the 5th cycle.  -- Continue X-Geva injection monthly. Dental clearance was done.  -- Mild elevated AST and ALT. Resolved. Will continue monitoring.  -- Order blood work: CBC, BMP, LFT, CEA and .  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 She saw neurosurgeon.   #Low back pain.  -- S/P L1 kyphoplasty on 4/11/25. Pain has reduced.  -- NSAIDs as needed.  -- F/U with Neurosurgery, Dr Taylor as scheduled.  # H/O elevated serum calcium. MM panel showed no evidence of M-spike. Serum light chain ration is normal. Repeat serum calcium is normal and PTH is also normal.

## 2025-05-06 NOTE — PHYSICAL EXAM
[Fully active, able to carry on all pre-disease performance without restriction] : Status 0 - Fully active, able to carry on all pre-disease performance without restriction [Normal] : affect appropriate [de-identified] :  Status post bilateral mastectomy and autologous tissue reconstruction. There is no skin lesion and no palpable axillary lymphadenopathy. [de-identified] : Lower abdominal fat necrosis. Abdominal scar present.  [de-identified] : Low back pain 2' metastatic disease.

## 2025-05-06 NOTE — HISTORY OF PRESENT ILLNESS
[de-identified] : This 68-year-old female is here today for a followup visit.  She has a history of bilateral invasive lobular carcinoma, stage IIIA (pT1c N2a M0) in the right breast, and stage IA (pT1b N0 M0) in the left breast.  Both sided breast cancer were ER/MS positive and HER2/deana negative.  She had bilateral mastectomy, bilateral sentinel lymph node biopsy, and right axillary lymph node dissection at [facility] on 01/08/2013.  She received adjuvant chemotherapy with Adriamycin and Cytoxan followed by paclitaxel.  She also received post mastectomy adjuvant radiotherapy to right side chest wall.  She has been on adjuvant endocrine therapy with Femara since 7/2013  and has been tolerating well.  She had genetic test for BRCA mutations.  She is negative for BRCA1/2 mutations.  In 10/2014, she had a bone density at [facility], which showed osteopenia. Her latest bone density was done 7/21/16 which showed osteopenia. MRI of breasts was done on 1/30/17 which showed no MR evidence of malignancy in either breast. Recommendation: Unless otherwise indicated by clinical findings, annual screening mammography recommended. This can be alternated at 6 month intervals with bilateral screening breast MRI.\par   She has been taking calcium and vitamin D supplements. Latest colonoscopy 11/2016 pt states it was negative. Saw Dr. Garcia 1/2017.\par  She saw Dr. Rankin recently. She is scheduled to have a revision of her lower abdominal fat necrosis on 1/2018.\par  \par   [de-identified] : 6/8/18: She stopped letrozole few months ago as she was found to be in Afib by her cardiologist on Pre-op clearance for abdominal fat necrosis. However her cardiologist () told her that you can resume Letrozole. She had surgery for abdominal fat necrosis and she is going for surgery for incisional hernia by .   12/13/18: The patient is here for followup visit. She has been taking Letrozole daily (since 7/2013) and tolerating well. She had hernia repair surgery. She complains some pain in her knees. She had MRI breast in 1 2017. There was no suspicious finding.  6/14/19; The patient is here for followup visit. She has been taking Letrozole daily (since 7/2013) and tolerating well. She complains some pain in her knees. She does not have other new complains. She has mild elevated calcium. PHT was normal.  12/13/2019 :  The patient is here for follow up. She has been on letrozole daily and tolerating well. She had a bone density 12/2/109 which showed osteopenia of femur and hip. She is not taking calcium and vit D since her last calcium was elevated 10.7. She had a recent US breasts 12/2019 for breast pain which was negative. She was seen by her cardiologist and had blood work in the summer. She was told that she has severe anemia. She started her on iron pills. She denies any vaginal bleeding or rectal bleeding  6/12/2020: The patient is here for follow up. She had bilateral invasive lobular carcinoma of the breast, ER/[place] positive and HER2/deana negative, status post bilateral mastectomy, bilateral sentinel lymph node biopsy, and right axillary lymph node dissection in 1/2013, status post adjuvant chemotherapy and adjuvant chest wall radiotherapy, currently on adjuvant endocrine therapy with letrozole and tolerating well. She had a bone density 12/2/19 which showed osteopenia of femur and hip. She is not taking calcium and vit D since her last calcium was elevated 10.7. She had a recent US breasts 12/2019 for breast pain which was negative. She does not have new complains.  12/02/2020: MANOJ is here for follow up visit for bilateral invasive lobular carcinoma of the breast, ER/NY positive and HER2/deana negative, status post bilateral mastectomy, bilateral sentinel lymph node biopsy, and right axillary lymph node dissection in 1/2013, status post adjuvant chemotherapy and adjuvant chest wall radiotherapy, currently on adjuvant endocrine therapy with letrozole and tolerating well besides mild arthralgia. She had a bone density 12/2/19 which showed osteopenia of femur and hip. She continues on Vitamin D daily. She denies any new breast symptoms at this time. In addition, patient was diagnosed with COVID in 3/2020.   06/02/2021: MANOJ is here for follow up visit for bilateral invasive lobular carcinoma of the breast, ER/NY positive and HER2/deana negative, status post bilateral mastectomy, bilateral sentinel lymph node biopsy, and right axillary lymph node dissection in 1/2013, status post adjuvant chemotherapy and adjuvant chest wall radiotherapy, currently on adjuvant endocrine therapy with letrozole since 7/2013 and tolerating well besides mild arthralgia. She had a bone density 12/2/19 which showed osteopenia of femur and hip. She continues on Vitamin D daily. She denies any new breast symptoms at this time. she had right breast skin tag biopsied by dermatologist; which was benign.   1/5/22 MANOJ is here for follow up visit for bilateral invasive lobular carcinoma of the breast, ER/NY positive and HER2/deana negative, status post bilateral mastectomy, bilateral sentinel lymph node biopsy, and right axillary lymph node dissection in 1/2013, status post adjuvant chemotherapy and adjuvant chest wall radiotherapy, currently on adjuvant endocrine therapy with letrozole since 7/2013 and tolerating well besides mild arthralgia. She had a bone density 12/7/21 which showed worsening osteopenia of femur and hip, T score -2.4. She is not complaint with Vitamin D, does not take calcium due to mildly elevated Calcium. She denies any new breast symptoms at this time. She was experiencing lower abdominal and pelvic pain in Sept had US which was unremarkable. In addition, had Thyroid US due to hypercalcemia which was unremarkable, In 8/2021 TSH 2.36, Calcium 10, and PTH was 78.   7/21/22: Manoj is here for follow up visit for bilateral invasive lobular carcinoma of the breast, ER/NY positive and HER2/deana negative, status post bilateral mastectomy, bilateral sentinel lymph node biopsy, and right axillary lymph node dissection in 1/2013, status post adjuvant chemotherapy and adjuvant chest wall radiotherapy, currently on adjuvant endocrine therapy with letrozole since 7/2013 and tolerating well besides mild arthralgia. She had a bone density 12/7/21 which showed worsening osteopenia of femur and hip, T score -2.4. She takes Vitamin D but not calcium because her calcium has been mildly elevated. She was found to have large hiatal hernia and she has had on and off diarrhea for long time. She has been seen by Dr. Borjas.   01/26/2023 Manoj is here for follow up visit for bilateral invasive lobular carcinoma of the breast, ER/NY positive and HER2/deana negative, status post bilateral mastectomy, bilateral sentinel lymph node biopsy, and right axillary lymph node dissection in 1/2013, status post adjuvant chemotherapy and adjuvant chest wall radiotherapy, currently on adjuvant endocrine therapy with letrozole since 7/2013 and tolerating well besides mild arthralgia. She had a bone density 12/7/21 which showed worsening osteopenia of femur and hip, T score -2.4. She takes Vitamin D but not calcium because her calcium has been mildly elevated. She was found to have large hiatal hernia and surgery on 11/21/22. Her Hb drooped to 9.5 g/dl after surgery. She was found to have high serum calcium but repeat calcium was normal. PTH was also within normal limit.  7/20/23 Manoj is here for follow up visit for bilateral invasive lobular carcinoma of the breast, ER/NY positive and HER2/deana negative, status post bilateral mastectomy, bilateral sentinel lymph node biopsy, and right axillary lymph node dissection in 1/2013, status post adjuvant chemotherapy and adjuvant chest wall radiotherapy, currently on adjuvant endocrine therapy with letrozole since 7/2013 and tolerating well besides mild arthralgia. She had a bone density 12/7/21 which showed worsening osteopenia of femur and hip, T score -2.4. She takes Vitamin D but not calcium because her calcium has been mildly elevated. Repeat serum calcium normal, MM panel normal. She was found to have large hiatal hernia and surgery on 11/21/22. Her Hb drooped to 9.5 g/dl after surgery, now normal. After hernia repair she was having symptoms consistent with vagal nerve mediated gastroparesis, she then had endoscopic pyloromyotomy, symptoms resolved.   1/25/2024: Manoj is here for follow up visit for bilateral invasive lobular carcinoma of the breast, ER/NY positive and HER2/deana negative, status post bilateral mastectomy, bilateral sentinel lymph node biopsy, and right axillary lymph node dissection in 1/2013, status post adjuvant chemotherapy and adjuvant chest wall radiotherapy, currently on adjuvant endocrine therapy with letrozole since 7/2013 and tolerating well besides mild arthralgia. She Completed 10 years treatment of Letrozole in 7/2023. She had a bone density 12/8/23 Showed:         ----------------------------------------------------------------- AP Spine (L1-L4)         0.998   -0.4      1.8     Normal Femoral Neck (Left) 0.552   -2.7     -0.7     Osteoporosis Total Hip (Left) 0.676   -2.2     -0.6     Osteopenia -which showed Osteoporosis of femur Neck and Osteopenia in the Hip. She takes Vitamin D.  After hernia repair, she was having symptoms consistent with vagal nerve mediated gastroparesis, she then had endoscopic pyloromyotomy, symptoms resolved.  She starts feeling better now.   3/29/24: Manoj is here for follow up visit. She has h/o bilateral invasive lobular carcinoma of the breast, ER/NY positive and HER2/deana negative, status post bilateral mastectomy, bilateral sentinel lymph node biopsy, and right axillary lymph node dissection in 1/2013, status post adjuvant chemotherapy and adjuvant chest wall radiotherapy, currently on adjuvant endocrine therapy with letrozole since 7/2013. She had CT AP in 2/2024 for abdominal pain which incidentally showed a lesion in T6. She had MRI spine on 3/11/24 which showed a 2.7 cm lesion within the left aspect of the T6 vertebral body and 7 mm marrow replacing lesion within the T11 vertebral body.  PET/CT on 3/28/24 showed Intense pathologic FDG uptake (SUV 9.3) coregistering with sclerotic lesion on the left side of T6 suspicious for biologic tumor activity. No other sites of abnormal FDG uptake. She is here today to discuss further management plan.  4/29/24: Manoj is here for follow up visit. She has h/o bilateral invasive lobular carcinoma of the breast, ER/NY positive and HER2/deana negative, status post bilateral mastectomy, bilateral sentinel lymph node biopsy, and right axillary lymph node dissection in 1/2013, status post adjuvant chemotherapy and adjuvant chest wall radiotherapy, Completed 10-year of endocrine therapy with Letrozole in 7/2023. She had CT AP in 2/2024 for abdominal pain which incidentally showed a lesion in T6. She had MRI spine on 3/11/24 which showed a 2.7 cm lesion within the left aspect of the T6 vertebral body and 7 mm marrow replacing lesion within the T11 vertebral body.  PET/CT on 3/28/24 showed Intense pathologic FDG uptake (SUV 9.3) coregistering with sclerotic lesion on the left side of T6 suspicious for biologic tumor activity. No other sites of abnormal FDG uptake. She had CT guided biopsy on 4/17/24 and is here today to discuss the result.     5/30/24: Manoj is here for follow up visit. She has h/o bilateral invasive lobular carcinoma of the breast, ER/NY positive and HER2/deana negative, status post bilateral mastectomy, bilateral sentinel lymph node biopsy, and right axillary lymph node dissection in 1/2013, status post adjuvant chemotherapy and adjuvant chest wall radiotherapy, Completed 10-year of endocrine therapy with Letrozole in 7/2023. She had CT AP in 2/2024 for abdominal pain which incidentally showed a lesion in T6. She had MRI spine on 3/11/24 which showed a 2.7 cm lesion within the left aspect of the T6 vertebral body and 7 mm marrow replacing lesion within the T11 vertebral body.  PET/CT on 3/28/24 showed Intense pathologic FDG uptake (SUV 9.3) coregistering with sclerotic lesion on the left side of T6 suspicious for biologic tumor activity.  She had CT guided biopsy on 4/17/24 which showed metastatic carcinoma consistent from breast primary. She saw Dr. Anaya and received palliative RT 2000cGy to T6 lesion and completed RT on 5/22/24.  6/27/24: Manoj is here for follow up visit. She has h/o bilateral invasive lobular carcinoma of the breast, ER/NY positive and HER2/deana negative, status post bilateral mastectomy, bilateral sentinel lymph node biopsy, and right axillary lymph node dissection in 1/2013, status post adjuvant chemotherapy and adjuvant chest wall radiotherapy, Completed 10-year of endocrine therapy with Letrozole in 7/2023. She had CT AP in 2/2024 for abdominal pain which incidentally showed a lesion in T6. She had MRI spine on 3/11/24 which showed a 2.7 cm lesion within the left aspect of the T6 vertebral body and 7 mm marrow replacing lesion within the T11 vertebral body.  PET/CT on 3/28/24 showed Intense pathologic FDG uptake (SUV 9.3) coregistering with sclerotic lesion on the left side of T6 suspicious for biologic tumor activity.  She had CT guided biopsy on 4/17/24 which showed metastatic carcinoma consistent from breast primary. She saw Dr. Anaya and received palliative RT 2000cGy to T6 lesion and completed RT on 5/22/24. She started on Xeloda and had 1st week treatment. She had loose stool but otherwise tolerated well.   08/01/24: bilateral sentinel lymph node biopsy, and right axillary lymph node dissection in 1/2013, status post adjuvant chemotherapy and adjuvant chest wall radiotherapy, Completed 10-year of endocrine therapy with Letrozole in 7/2023. She had CT AP in 2/2024 for abdominal pain which incidentally showed a lesion in T6. She had MRI spine on 3/11/24 which showed a 2.7 cm lesion within the left aspect of the T6 vertebral body and 7 mm marrow replacing lesion within the T11 vertebral body.  PET/CT on 3/28/24 showed Intense pathologic FDG uptake (SUV 9.3) coregistering with sclerotic lesion on the left side of T6 suspicious for biologic tumor activity.  She had CT guided biopsy on 4/17/24 which showed metastatic carcinoma consistent from breast primary. She saw Dr. Anaya and received palliative RT 2000cGy to T6 lesion and completed RT on 5/22/24. She started on Xeloda and had 1st week of June 2024. She is complaining of persistence diarrhea despite Imodium intake, currently on Xeloda 3tabs every 12 hrs for one week on and one week off.   8/29/24: Manoj is here for follow up visit. She has h/o bilateral invasive lobular carcinoma of the breast, ER/[place] positive and HER2/deana negative, status post bilateral mastectomy, bilateral sentinel lymph node biopsy, and right axillary lymph node dissection in 1/2013, status post adjuvant chemotherapy and adjuvant chest wall radiotherapy, Completed 10-year of endocrine therapy with Letrozole in 7/2023. She had CT AP in 2/2024 for abdominal pain which incidentally showed a lesion in T6. She had MRI spine on 3/11/24 which showed a 2.7 cm lesion within the left aspect of the T6 vertebral body and 7 mm marrow replacing lesion within the T11 vertebral body.  PET/CT on 3/28/24 showed Intense pathologic FDG uptake (SUV 9.3) coregistering with sclerotic lesion on the left side of T6 suspicious for biologic tumor activity.  She had CT guided biopsy on 4/17/24 which showed metastatic carcinoma consistent from breast primary. She saw Dr. Anaya and received palliative RT 2000cGy to T6 lesion and completed RT on 5/22/24. She has been on Xeloda and did not tolerate well. She complains diarrhea, weight loss and eye irritation.   10/10/24 Manoj is here for follow up visit. She has h/o bilateral invasive lobular carcinoma of the breast, ER/NY positive and HER2/deana negative, status post bilateral mastectomy, bilateral sentinel lymph node biopsy, and right axillary lymph node dissection in 1/2013, status post adjuvant chemotherapy and adjuvant chest wall radiotherapy, Completed 10-year of endocrine therapy with Letrozole in 7/2023. She had CT AP in 2/2024 for abdominal pain which incidentally showed a lesion in T6. She had MRI spine on 3/11/24 which showed a 2.7 cm lesion within the left aspect of the T6 vertebral body and 7 mm marrow replacing lesion within the T11 vertebral body.  PET/CT on 3/28/24 showed Intense pathologic FDG uptake (SUV 9.3) coregistering with sclerotic lesion on the left side of T6 suspicious for biologic tumor activity.  She had CT guided biopsy on 4/17/24 which showed metastatic carcinoma consistent from breast primary. She saw Dr. Anaya and received palliative RT 2000cGy to T6 lesion and completed RT on 5/22/24. She took Xeloda for 3 months and did not tolerate well. Xeloda was discontinued. She had a PET scan in 9.24 which showed interval resolution of uptake in T6 and New uptake in L1 and new pleural effussion. She completed XRT 5 fractions in 9.24.  She is here for follow up.  11/11/24 Manoj is here for follow up visit. She has h/o bilateral invasive lobular carcinoma of the breast, ER/NY positive and HER2/deana negative, status post bilateral mastectomy, bilateral sentinel lymph node biopsy, and right axillary lymph node dissection in 1/2013, status post adjuvant chemotherapy and adjuvant chest wall radiotherapy, Completed 10-year of endocrine therapy with Letrozole in 7/2023. She developed recurrent disease with a lesion in T6 in 2/2024. CT guided biopsy on 4/17/24 showed metastatic carcinoma consistent from breast primary. She saw Dr. Anaya and received palliative RT 2000cGy to T6 lesion and completed RT on 5/22/24. She took Xeloda for 3 months and did not tolerate well. Xeloda was discontinued. She had repeat PET/CT in 9.24 which showed interval resolution of uptake in T6 but new uptake in L1 and new pleural effusion. She completed XRT 5 fractions. She complains low back pain.   12/30/24 Manoj is here for follow up visit. Her spouse is with her. She has h/o bilateral invasive lobular carcinoma of the breast, ER/[place] positive and HER2/deana negative, s/p bilateral mastectomy, bilateral sentinel lymph node biopsy, and right axillary lymph node dissection in 1/2013, s/p adjuvant chemotherapy and adjuvant chest wall radiotherapy, Completed 10-year of endocrine therapy with Letrozole in 7/2023. She developed recurrent disease with a lesion in T6 in 2/2024. CT guided biopsy on 4/17/24 showed metastatic carcinoma consistent from breast primary. She saw Dr. Anaya and received palliative RT 2000cGy to T6 lesion and completed RT on 5/22/24. She took Xeloda for 3 months and did not tolerate well. Xeloda was discontinued. She had repeat PET/CT in 9/16/24 which showed interval resolution of uptake in T6 but new uptake in L1 and new pleural effusion. MR Lumbar spine on 9/25/24 showed Infiltrative enhancing bone marrow signal abnormality within the T11 and L1 vertebral bodies most compatible with osseous metastatic disease. Mild compression deformity of L1, likely pathologic. Multilevel degenerative changes as described. She completed XRT 5 fractions on 10/1/24 -10/7/24. She has a history of fall on 9/2024. CT chest 12/12/24 showed no pleural effusion. She complains low back pain, taking Aleeve as needed with relief.  1/14/25: Manoj is here for follow up visit. Her spouse is with her. She has h/o bilateral invasive lobular carcinoma of the breast, ER/[place] positive and HER2/deana negative, s/p bilateral mastectomy, bilateral sentinel lymph node biopsy, and right axillary lymph node dissection in 1/2013, s/p adjuvant chemotherapy and adjuvant chest wall radiotherapy, Completed 10-year of endocrine therapy with Letrozole in 7/2023. She developed recurrent disease with a lesion in T6 in 2/2024. CT guided biopsy on 4/17/24 showed metastatic carcinoma consistent from breast primary. She saw Dr. Anaya and received palliative RT 2000cGy to T6 lesion and completed RT on 5/22/24. She took Xeloda for 3 months and did not tolerate. Xeloda was discontinued. She had repeat PET/CT in 9/16/24 which showed interval resolution of uptake in T6 but new uptake in L1 and new pleural effusion.  She completed XRT 5 fractions on 10/1/24 -10/7/24. She has been taking Letrozole. She feels some aches and pains.   2/20/25 Manoj is here for follow up visit. Her spouse is with her. She has h/o bilateral invasive lobular carcinoma of the breast, ER/[place] positive and HER2/deana negative, s/p bilateral mastectomy, bilateral sentinel lymph node biopsy, and right axillary lymph node dissection in 1/2013, s/p adjuvant chemotherapy and adjuvant chest wall radiotherapy, Completed 10-year of endocrine therapy with Letrozole in 7/2023. She developed recurrent disease with a lesion in T6 in 2/2024. CT guided biopsy on 4/17/24 showed metastatic carcinoma consistent from breast primary. She saw Dr. Anaya and received palliative RT 2000cGy to T6 lesion and completed RT on 5/22/24. She took Xeloda for 3 months and did not tolerate. Xeloda was discontinued. She had repeat PET/CT in 9/16/24 which showed interval resolution of uptake in T6 but new uptake in L1 and new pleural effusion. She completed XRT 5 fractions on 10/1/24 -10/7/24. She has been taking Letrozole. PET/CT on 1/7/25 showed several new foci of pathologic FDG uptake in bones, suspicious for biologic tumor activity. Interval resolution of previously noted small right pleural effusion. She feels some aches and pains. She started weekly Taxol, 2 weeks on and 1 week off on 1/31/25, s/p cycle #1 and is tolerating well except for diarrhea yesterday, managed with Imodium. She denies nausea, vomiting, or numbness or tingling of hands/feet. She saw Dr Taylor and is planned for L1 kyphoplasty on 3/18/25.  3/13/25 Manoj is here for follow up visit. Her spouse is with her. She has h/o bilateral invasive lobular carcinoma of the breast, ER/[place] positive and HER2/deana negative, s/p bilateral mastectomy, bilateral sentinel lymph node biopsy, and right axillary lymph node dissection in 1/2013, s/p adjuvant chemotherapy and adjuvant chest wall radiotherapy, Completed 10-year of endocrine therapy with Letrozole in 7/2023. She developed recurrent disease with a lesion in T6 in 2/2024. CT guided biopsy on 4/17/24 showed metastatic carcinoma consistent from breast primary. She saw Dr. Anaya and received palliative RT 2000cGy to T6 lesion and completed RT on 5/22/24. She took Xeloda for 3 months and did not tolerate. Xeloda was discontinued. She had repeat PET/CT in 9/16/24 which showed interval resolution of uptake in T6 but new uptake in L1 and new pleural effusion. She completed XRT 5 fractions on 10/1/24 -10/7/24. She has been taking Letrozole. PET/CT on 1/7/25 showed several new foci of pathologic FDG uptake in bones, suspicious for biologic tumor activity. She started weekly Taxol, 2 weeks on and 1 week off on 1/31/25, s/p 2 cycles and tolerated.    4/3/25 Manoj is here for follow up visit. Her spouse is with her. She has h/o bilateral invasive lobular carcinoma of the breast, ER/[place] positive and HER2/deana negative, s/p bilateral mastectomy, bilateral sentinel lymph node biopsy, and right axillary lymph node dissection in 1/2013, s/p adjuvant chemotherapy and adjuvant chest wall radiotherapy, Completed 10-year of endocrine therapy with Letrozole in 7/2023. She developed recurrent disease with a lesion in T6 in 2/2024. CT guided biopsy on 4/17/24 showed metastatic carcinoma consistent from breast primary. She saw Dr. Anaya and received palliative RT 2000cGy to T6 lesion and completed RT on 5/22/24. She took Xeloda for 3 months and did not tolerate. Xeloda was discontinued. She had repeat PET/CT in 9/16/24 which showed interval resolution of uptake in T6 but new uptake in L1 and new pleural effusion. She completed XRT 5 fractions on 10/1/24 -10/7/24. She was taking Letrozole. PET/CT on 1/7/25 showed several new foci of pathologic FDG uptake in bones, suspicious for biologic tumor activity. She started weekly Taxol, 2 weeks on and 1 week off on 1/31/25, s/p 3 cycles and tolerated. She feels some numbness and tingling in her hands and feet. She does not have n/v/d, mouth sore, chill or fever.    4/16/25 Manoj is here for follow up visit. Her spouse is with her. She has h/o bilateral invasive lobular carcinoma of the breast, ER/[place] positive and HER2/deana negative, s/p bilateral mastectomy, bilateral sentinel lymph node biopsy, and right axillary lymph node dissection in 1/2013, s/p adjuvant chemotherapy and adjuvant chest wall radiotherapy, Completed 10-year of endocrine therapy with Letrozole in 7/2023. She developed recurrent disease with a lesion in T6 in 2/2024. CT guided biopsy on 4/17/24 showed metastatic carcinoma consistent from breast primary. She saw Dr. Anaya and received palliative RT 2000cGy to T6 lesion and completed RT on 5/22/24. She took Xeloda for 3 months and did not tolerate. Xeloda was discontinued. She had repeat PET/CT in 9/16/24 which showed interval resolution of uptake in T6 but new uptake in L1 and new pleural effusion. She completed XRT 5 fractions on 10/1/24 -10/7/24. She was taking Letrozole. PET/CT on 1/7/25 showed several new foci of pathologic FDG uptake in bones, suspicious for biologic tumor activity. She started weekly Taxol, 2 weeks on and 1 week off on 1/31/25, s/p 3 cycles and tolerated. She feels some numbness and tingling in her hands and feet. She does not have n/v/d, mouth sore, chill or fever.    4/30/25 Manoj is here for follow up visit and chemotherapy. Her  is with her. She has h/o bilateral invasive lobular carcinoma of the breast, ER/[place] positive and HER2/deana negative, s/p bilateral mastectomy, bilateral sentinel lymph node biopsy, and right axillary lymph node dissection in 1/2013, s/p adjuvant chemotherapy and adjuvant chest wall radiotherapy, Completed 10-year of endocrine therapy with Letrozole in 7/2023. She developed recurrent disease with a lesion in T6 in 2/2024. CT guided biopsy on 4/17/24 showed metastatic carcinoma consistent from breast primary. She received palliative RT 2000cGy to T6 lesion and completed RT on 5/22/24. She took Xeloda for 3 months and did not tolerate. Xeloda was discontinued. She had repeat PET/CT in 9/16/24 which showed interval resolution of uptake in T6 but new uptake in L1 and new pleural effusion. She completed XRT 5 fractions on 10/1/24 -10/7/24. She was taking Letrozole. PET/CT on 1/7/25 showed several new foci of pathologic FDG uptake in bones, suspicious for biologic tumor activity. She started weekly Taxol, 2 weeks on and 1 week off on 1/31/25, s/p 4 cycles and tolerated. She has mild neuropathy symptoms. She does not have n/v/d, mouth sore, chill or fever.

## 2025-05-06 NOTE — ASSESSMENT
[FreeTextEntry1] : Assessment and Plan: # H/O Bilateral invasive lobular carcinoma of the breast, ER/[place] positive and HER2/deana negative, status post bilateral mastectomy, bilateral sentinel lymph node biopsy, and right axillary lymph node dissection, status post adjuvant chemotherapy and adjuvant chest wall radiotherapy, Completed 10-year of endocrine therapy with Letrozole in 7/2023.  - S/P b/l mastectomy. There is no palpable abnormality.  # Recurrent breast cancer(ER/[place]/Her 2 negative Biopsy proven) with T6 vertebral body lesion S/P XRT(5/24) now presenting with new L1 lesion S/P XRT  9.24 - MRI spine on 3/11/24 showed a 2.7 cm lesion within the left aspect of the T6 vertebral body and 7 mm marrow replacing lesion within the T11 vertebral body.  - PET/CT on 3/28/24 showed Intense pathologic FDG uptake (SUV 9.3) coregistering with sclerotic lesion on the left side of T6 suspicious for biologic tumor activity. No other sites of abnormal FDG uptake.  - CT guided core bx of the lesion in T6 vertebral body on 4/17/24 showed metastatic carcinoma, favor breast primary, ER/[place] negative. Her2 is negative.  - S/P palliative RT 2000cGy to T6 lesion and completed RT on 5/22/24. - She took Xeloda for 3 months, 1500 mg twice a day, one week on and one week off. Due to frequent diarrhea, dose reduced to Xeloda 1000 mg q12h, 1 week on and 1 week off.  She developed multiple side effects and Xeloda was discontinued.  -- PET/CT in 9/16/24 showed interval resolution of uptake in T6 and New uptake in L1 and new pleural effusion.  -- MRI of lumbar spine 9/25/24 showed infiltrative enhancing bone marrow signal abnormality within the T11 and L1 vertebral bodies most compatible with osseous metastatic disease. Mild compression deformity of L1, likely pathologic. -- She received XRT 5 fractions 2000cGy to Vertebrae, Lumbar on 10/1/24 - 10/7/24. -- Recurrent breast cancer in the spine is triple negative. She had palliative RT x 2. There was no evidence of visceral disease or other site disease. Since her initial breast cancer was hormone receptor and bone met biopsy could have inadequate IHC staining for hormone receptor, she was given Letrozole.  -- Repeat PET/CT on 1/7/25 showed several new foci of pathologic FDG uptake in bones, suspicious for biologic tumor activity. Interval resolution of previously noted small right pleural effusion. -- In light of progression of disease, she switched to Taxol 80 mg weekly, 2 weeks on and one week off. S/P 4 cycles. Repeat PET/CT on 4/23/25 showed stable bony mets with SUV values trending down in some areas and trending up slightly in the other areas. There is no visceral mets.  -- Will continue weekly Taxol and proceed with the 5th cycle.  -- Continue X-Geva injection monthly. Dental clearance was done.  -- Mild elevated AST and ALT. Resolved. Will continue monitoring.  -- Order blood work: CBC, BMP, LFT, CEA and .  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 She saw neurosurgeon.   #Low back pain.  -- S/P L1 kyphoplasty on 4/11/25. Pain has reduced.  -- NSAIDs as needed.  -- F/U with Neurosurgery, Dr Taylor as scheduled.  # H/O elevated serum calcium. MM panel showed no evidence of M-spike. Serum light chain ration is normal. Repeat serum calcium is normal and PTH is also normal.

## 2025-05-06 NOTE — PHYSICAL EXAM
[Fully active, able to carry on all pre-disease performance without restriction] : Status 0 - Fully active, able to carry on all pre-disease performance without restriction [Normal] : affect appropriate [de-identified] :  Status post bilateral mastectomy and autologous tissue reconstruction. There is no skin lesion and no palpable axillary lymphadenopathy. [de-identified] : Lower abdominal fat necrosis. Abdominal scar present.  [de-identified] : Low back pain 2' metastatic disease.

## 2025-05-06 NOTE — HISTORY OF PRESENT ILLNESS
[de-identified] : This 68-year-old female is here today for a followup visit.  She has a history of bilateral invasive lobular carcinoma, stage IIIA (pT1c N2a M0) in the right breast, and stage IA (pT1b N0 M0) in the left breast.  Both sided breast cancer were ER/IA positive and HER2/deana negative.  She had bilateral mastectomy, bilateral sentinel lymph node biopsy, and right axillary lymph node dissection at [facility] on 01/08/2013.  She received adjuvant chemotherapy with Adriamycin and Cytoxan followed by paclitaxel.  She also received post mastectomy adjuvant radiotherapy to right side chest wall.  She has been on adjuvant endocrine therapy with Femara since 7/2013  and has been tolerating well.  She had genetic test for BRCA mutations.  She is negative for BRCA1/2 mutations.  In 10/2014, she had a bone density at [facility], which showed osteopenia. Her latest bone density was done 7/21/16 which showed osteopenia. MRI of breasts was done on 1/30/17 which showed no MR evidence of malignancy in either breast. Recommendation: Unless otherwise indicated by clinical findings, annual screening mammography recommended. This can be alternated at 6 month intervals with bilateral screening breast MRI.\par   She has been taking calcium and vitamin D supplements. Latest colonoscopy 11/2016 pt states it was negative. Saw Dr. Garcia 1/2017.\par  She saw Dr. Rankin recently. She is scheduled to have a revision of her lower abdominal fat necrosis on 1/2018.\par  \par   [de-identified] : 6/8/18: She stopped letrozole few months ago as she was found to be in Afib by her cardiologist on Pre-op clearance for abdominal fat necrosis. However her cardiologist () told her that you can resume Letrozole. She had surgery for abdominal fat necrosis and she is going for surgery for incisional hernia by .   12/13/18: The patient is here for followup visit. She has been taking Letrozole daily (since 7/2013) and tolerating well. She had hernia repair surgery. She complains some pain in her knees. She had MRI breast in 1 2017. There was no suspicious finding.  6/14/19; The patient is here for followup visit. She has been taking Letrozole daily (since 7/2013) and tolerating well. She complains some pain in her knees. She does not have other new complains. She has mild elevated calcium. PHT was normal.  12/13/2019 :  The patient is here for follow up. She has been on letrozole daily and tolerating well. She had a bone density 12/2/109 which showed osteopenia of femur and hip. She is not taking calcium and vit D since her last calcium was elevated 10.7. She had a recent US breasts 12/2019 for breast pain which was negative. She was seen by her cardiologist and had blood work in the summer. She was told that she has severe anemia. She started her on iron pills. She denies any vaginal bleeding or rectal bleeding  6/12/2020: The patient is here for follow up. She had bilateral invasive lobular carcinoma of the breast, ER/TX positive and HER2/deana negative, status post bilateral mastectomy, bilateral sentinel lymph node biopsy, and right axillary lymph node dissection in 1/2013, status post adjuvant chemotherapy and adjuvant chest wall radiotherapy, currently on adjuvant endocrine therapy with letrozole and tolerating well. She had a bone density 12/2/19 which showed osteopenia of femur and hip. She is not taking calcium and vit D since her last calcium was elevated 10.7. She had a recent US breasts 12/2019 for breast pain which was negative. She does not have new complains.  12/02/2020: MANOJ is here for follow up visit for bilateral invasive lobular carcinoma of the breast, ER/TX positive and HER2/deana negative, status post bilateral mastectomy, bilateral sentinel lymph node biopsy, and right axillary lymph node dissection in 1/2013, status post adjuvant chemotherapy and adjuvant chest wall radiotherapy, currently on adjuvant endocrine therapy with letrozole and tolerating well besides mild arthralgia. She had a bone density 12/2/19 which showed osteopenia of femur and hip. She continues on Vitamin D daily. She denies any new breast symptoms at this time. In addition, patient was diagnosed with COVID in 3/2020.   06/02/2021: MANOJ is here for follow up visit for bilateral invasive lobular carcinoma of the breast, ER/TX positive and HER2/deana negative, status post bilateral mastectomy, bilateral sentinel lymph node biopsy, and right axillary lymph node dissection in 1/2013, status post adjuvant chemotherapy and adjuvant chest wall radiotherapy, currently on adjuvant endocrine therapy with letrozole since 7/2013 and tolerating well besides mild arthralgia. She had a bone density 12/2/19 which showed osteopenia of femur and hip. She continues on Vitamin D daily. She denies any new breast symptoms at this time. she had right breast skin tag biopsied by dermatologist; which was benign.   1/5/22 MANOJ is here for follow up visit for bilateral invasive lobular carcinoma of the breast, ER/TX positive and HER2/deana negative, status post bilateral mastectomy, bilateral sentinel lymph node biopsy, and right axillary lymph node dissection in 1/2013, status post adjuvant chemotherapy and adjuvant chest wall radiotherapy, currently on adjuvant endocrine therapy with letrozole since 7/2013 and tolerating well besides mild arthralgia. She had a bone density 12/7/21 which showed worsening osteopenia of femur and hip, T score -2.4. She is not complaint with Vitamin D, does not take calcium due to mildly elevated Calcium. She denies any new breast symptoms at this time. She was experiencing lower abdominal and pelvic pain in Sept had US which was unremarkable. In addition, had Thyroid US due to hypercalcemia which was unremarkable, In 8/2021 TSH 2.36, Calcium 10, and PTH was 78.   7/21/22: Manoj is here for follow up visit for bilateral invasive lobular carcinoma of the breast, ER/TX positive and HER2/deana negative, status post bilateral mastectomy, bilateral sentinel lymph node biopsy, and right axillary lymph node dissection in 1/2013, status post adjuvant chemotherapy and adjuvant chest wall radiotherapy, currently on adjuvant endocrine therapy with letrozole since 7/2013 and tolerating well besides mild arthralgia. She had a bone density 12/7/21 which showed worsening osteopenia of femur and hip, T score -2.4. She takes Vitamin D but not calcium because her calcium has been mildly elevated. She was found to have large hiatal hernia and she has had on and off diarrhea for long time. She has been seen by Dr. Borjas.   01/26/2023 Manoj is here for follow up visit for bilateral invasive lobular carcinoma of the breast, ER/TX positive and HER2/deana negative, status post bilateral mastectomy, bilateral sentinel lymph node biopsy, and right axillary lymph node dissection in 1/2013, status post adjuvant chemotherapy and adjuvant chest wall radiotherapy, currently on adjuvant endocrine therapy with letrozole since 7/2013 and tolerating well besides mild arthralgia. She had a bone density 12/7/21 which showed worsening osteopenia of femur and hip, T score -2.4. She takes Vitamin D but not calcium because her calcium has been mildly elevated. She was found to have large hiatal hernia and surgery on 11/21/22. Her Hb drooped to 9.5 g/dl after surgery. She was found to have high serum calcium but repeat calcium was normal. PTH was also within normal limit.  7/20/23 Manoj is here for follow up visit for bilateral invasive lobular carcinoma of the breast, ER/TX positive and HER2/deana negative, status post bilateral mastectomy, bilateral sentinel lymph node biopsy, and right axillary lymph node dissection in 1/2013, status post adjuvant chemotherapy and adjuvant chest wall radiotherapy, currently on adjuvant endocrine therapy with letrozole since 7/2013 and tolerating well besides mild arthralgia. She had a bone density 12/7/21 which showed worsening osteopenia of femur and hip, T score -2.4. She takes Vitamin D but not calcium because her calcium has been mildly elevated. Repeat serum calcium normal, MM panel normal. She was found to have large hiatal hernia and surgery on 11/21/22. Her Hb drooped to 9.5 g/dl after surgery, now normal. After hernia repair she was having symptoms consistent with vagal nerve mediated gastroparesis, she then had endoscopic pyloromyotomy, symptoms resolved.   1/25/2024: Manoj is here for follow up visit for bilateral invasive lobular carcinoma of the breast, ER/TX positive and HER2/deana negative, status post bilateral mastectomy, bilateral sentinel lymph node biopsy, and right axillary lymph node dissection in 1/2013, status post adjuvant chemotherapy and adjuvant chest wall radiotherapy, currently on adjuvant endocrine therapy with letrozole since 7/2013 and tolerating well besides mild arthralgia. She Completed 10 years treatment of Letrozole in 7/2023. She had a bone density 12/8/23 Showed:         ----------------------------------------------------------------- AP Spine (L1-L4)         0.998   -0.4      1.8     Normal Femoral Neck (Left) 0.552   -2.7     -0.7     Osteoporosis Total Hip (Left) 0.676   -2.2     -0.6     Osteopenia -which showed Osteoporosis of femur Neck and Osteopenia in the Hip. She takes Vitamin D.  After hernia repair, she was having symptoms consistent with vagal nerve mediated gastroparesis, she then had endoscopic pyloromyotomy, symptoms resolved.  She starts feeling better now.   3/29/24: Manoj is here for follow up visit. She has h/o bilateral invasive lobular carcinoma of the breast, ER/TX positive and HER2/deana negative, status post bilateral mastectomy, bilateral sentinel lymph node biopsy, and right axillary lymph node dissection in 1/2013, status post adjuvant chemotherapy and adjuvant chest wall radiotherapy, currently on adjuvant endocrine therapy with letrozole since 7/2013. She had CT AP in 2/2024 for abdominal pain which incidentally showed a lesion in T6. She had MRI spine on 3/11/24 which showed a 2.7 cm lesion within the left aspect of the T6 vertebral body and 7 mm marrow replacing lesion within the T11 vertebral body.  PET/CT on 3/28/24 showed Intense pathologic FDG uptake (SUV 9.3) coregistering with sclerotic lesion on the left side of T6 suspicious for biologic tumor activity. No other sites of abnormal FDG uptake. She is here today to discuss further management plan.  4/29/24: Manoj is here for follow up visit. She has h/o bilateral invasive lobular carcinoma of the breast, ER/TX positive and HER2/deana negative, status post bilateral mastectomy, bilateral sentinel lymph node biopsy, and right axillary lymph node dissection in 1/2013, status post adjuvant chemotherapy and adjuvant chest wall radiotherapy, Completed 10-year of endocrine therapy with Letrozole in 7/2023. She had CT AP in 2/2024 for abdominal pain which incidentally showed a lesion in T6. She had MRI spine on 3/11/24 which showed a 2.7 cm lesion within the left aspect of the T6 vertebral body and 7 mm marrow replacing lesion within the T11 vertebral body.  PET/CT on 3/28/24 showed Intense pathologic FDG uptake (SUV 9.3) coregistering with sclerotic lesion on the left side of T6 suspicious for biologic tumor activity. No other sites of abnormal FDG uptake. She had CT guided biopsy on 4/17/24 and is here today to discuss the result.     5/30/24: Manoj is here for follow up visit. She has h/o bilateral invasive lobular carcinoma of the breast, ER/TX positive and HER2/deana negative, status post bilateral mastectomy, bilateral sentinel lymph node biopsy, and right axillary lymph node dissection in 1/2013, status post adjuvant chemotherapy and adjuvant chest wall radiotherapy, Completed 10-year of endocrine therapy with Letrozole in 7/2023. She had CT AP in 2/2024 for abdominal pain which incidentally showed a lesion in T6. She had MRI spine on 3/11/24 which showed a 2.7 cm lesion within the left aspect of the T6 vertebral body and 7 mm marrow replacing lesion within the T11 vertebral body.  PET/CT on 3/28/24 showed Intense pathologic FDG uptake (SUV 9.3) coregistering with sclerotic lesion on the left side of T6 suspicious for biologic tumor activity.  She had CT guided biopsy on 4/17/24 which showed metastatic carcinoma consistent from breast primary. She saw Dr. Anaya and received palliative RT 2000cGy to T6 lesion and completed RT on 5/22/24.  6/27/24: Manoj is here for follow up visit. She has h/o bilateral invasive lobular carcinoma of the breast, ER/TX positive and HER2/deana negative, status post bilateral mastectomy, bilateral sentinel lymph node biopsy, and right axillary lymph node dissection in 1/2013, status post adjuvant chemotherapy and adjuvant chest wall radiotherapy, Completed 10-year of endocrine therapy with Letrozole in 7/2023. She had CT AP in 2/2024 for abdominal pain which incidentally showed a lesion in T6. She had MRI spine on 3/11/24 which showed a 2.7 cm lesion within the left aspect of the T6 vertebral body and 7 mm marrow replacing lesion within the T11 vertebral body.  PET/CT on 3/28/24 showed Intense pathologic FDG uptake (SUV 9.3) coregistering with sclerotic lesion on the left side of T6 suspicious for biologic tumor activity.  She had CT guided biopsy on 4/17/24 which showed metastatic carcinoma consistent from breast primary. She saw Dr. Anaya and received palliative RT 2000cGy to T6 lesion and completed RT on 5/22/24. She started on Xeloda and had 1st week treatment. She had loose stool but otherwise tolerated well.   08/01/24: bilateral sentinel lymph node biopsy, and right axillary lymph node dissection in 1/2013, status post adjuvant chemotherapy and adjuvant chest wall radiotherapy, Completed 10-year of endocrine therapy with Letrozole in 7/2023. She had CT AP in 2/2024 for abdominal pain which incidentally showed a lesion in T6. She had MRI spine on 3/11/24 which showed a 2.7 cm lesion within the left aspect of the T6 vertebral body and 7 mm marrow replacing lesion within the T11 vertebral body.  PET/CT on 3/28/24 showed Intense pathologic FDG uptake (SUV 9.3) coregistering with sclerotic lesion on the left side of T6 suspicious for biologic tumor activity.  She had CT guided biopsy on 4/17/24 which showed metastatic carcinoma consistent from breast primary. She saw Dr. Anaya and received palliative RT 2000cGy to T6 lesion and completed RT on 5/22/24. She started on Xeloda and had 1st week of June 2024. She is complaining of persistence diarrhea despite Imodium intake, currently on Xeloda 3tabs every 12 hrs for one week on and one week off.   8/29/24: Manoj is here for follow up visit. She has h/o bilateral invasive lobular carcinoma of the breast, ER/TX positive and HER2/deana negative, status post bilateral mastectomy, bilateral sentinel lymph node biopsy, and right axillary lymph node dissection in 1/2013, status post adjuvant chemotherapy and adjuvant chest wall radiotherapy, Completed 10-year of endocrine therapy with Letrozole in 7/2023. She had CT AP in 2/2024 for abdominal pain which incidentally showed a lesion in T6. She had MRI spine on 3/11/24 which showed a 2.7 cm lesion within the left aspect of the T6 vertebral body and 7 mm marrow replacing lesion within the T11 vertebral body.  PET/CT on 3/28/24 showed Intense pathologic FDG uptake (SUV 9.3) coregistering with sclerotic lesion on the left side of T6 suspicious for biologic tumor activity.  She had CT guided biopsy on 4/17/24 which showed metastatic carcinoma consistent from breast primary. She saw Dr. Anaya and received palliative RT 2000cGy to T6 lesion and completed RT on 5/22/24. She has been on Xeloda and did not tolerate well. She complains diarrhea, weight loss and eye irritation.   10/10/24 Manoj is here for follow up visit. She has h/o bilateral invasive lobular carcinoma of the breast, ER/TX positive and HER2/deana negative, status post bilateral mastectomy, bilateral sentinel lymph node biopsy, and right axillary lymph node dissection in 1/2013, status post adjuvant chemotherapy and adjuvant chest wall radiotherapy, Completed 10-year of endocrine therapy with Letrozole in 7/2023. She had CT AP in 2/2024 for abdominal pain which incidentally showed a lesion in T6. She had MRI spine on 3/11/24 which showed a 2.7 cm lesion within the left aspect of the T6 vertebral body and 7 mm marrow replacing lesion within the T11 vertebral body.  PET/CT on 3/28/24 showed Intense pathologic FDG uptake (SUV 9.3) coregistering with sclerotic lesion on the left side of T6 suspicious for biologic tumor activity.  She had CT guided biopsy on 4/17/24 which showed metastatic carcinoma consistent from breast primary. She saw Dr. Anaya and received palliative RT 2000cGy to T6 lesion and completed RT on 5/22/24. She took Xeloda for 3 months and did not tolerate well. Xeloda was discontinued. She had a PET scan in 9.24 which showed interval resolution of uptake in T6 and New uptake in L1 and new pleural effussion. She completed XRT 5 fractions in 9.24.  She is here for follow up.  11/11/24 Manoj is here for follow up visit. She has h/o bilateral invasive lobular carcinoma of the breast, ER/TX positive and HER2/deana negative, status post bilateral mastectomy, bilateral sentinel lymph node biopsy, and right axillary lymph node dissection in 1/2013, status post adjuvant chemotherapy and adjuvant chest wall radiotherapy, Completed 10-year of endocrine therapy with Letrozole in 7/2023. She developed recurrent disease with a lesion in T6 in 2/2024. CT guided biopsy on 4/17/24 showed metastatic carcinoma consistent from breast primary. She saw Dr. Anaya and received palliative RT 2000cGy to T6 lesion and completed RT on 5/22/24. She took Xeloda for 3 months and did not tolerate well. Xeloda was discontinued. She had repeat PET/CT in 9.24 which showed interval resolution of uptake in T6 but new uptake in L1 and new pleural effusion. She completed XRT 5 fractions. She complains low back pain.   12/30/24 Manoj is here for follow up visit. Her spouse is with her. She has h/o bilateral invasive lobular carcinoma of the breast, ER/TX positive and HER2/deana negative, s/p bilateral mastectomy, bilateral sentinel lymph node biopsy, and right axillary lymph node dissection in 1/2013, s/p adjuvant chemotherapy and adjuvant chest wall radiotherapy, Completed 10-year of endocrine therapy with Letrozole in 7/2023. She developed recurrent disease with a lesion in T6 in 2/2024. CT guided biopsy on 4/17/24 showed metastatic carcinoma consistent from breast primary. She saw Dr. Anaya and received palliative RT 2000cGy to T6 lesion and completed RT on 5/22/24. She took Xeloda for 3 months and did not tolerate well. Xeloda was discontinued. She had repeat PET/CT in 9/16/24 which showed interval resolution of uptake in T6 but new uptake in L1 and new pleural effusion. MR Lumbar spine on 9/25/24 showed Infiltrative enhancing bone marrow signal abnormality within the T11 and L1 vertebral bodies most compatible with osseous metastatic disease. Mild compression deformity of L1, likely pathologic. Multilevel degenerative changes as described. She completed XRT 5 fractions on 10/1/24 -10/7/24. She has a history of fall on 9/2024. CT chest 12/12/24 showed no pleural effusion. She complains low back pain, taking Aleeve as needed with relief.  1/14/25: Manoj is here for follow up visit. Her spouse is with her. She has h/o bilateral invasive lobular carcinoma of the breast, ER/TX positive and HER2/deana negative, s/p bilateral mastectomy, bilateral sentinel lymph node biopsy, and right axillary lymph node dissection in 1/2013, s/p adjuvant chemotherapy and adjuvant chest wall radiotherapy, Completed 10-year of endocrine therapy with Letrozole in 7/2023. She developed recurrent disease with a lesion in T6 in 2/2024. CT guided biopsy on 4/17/24 showed metastatic carcinoma consistent from breast primary. She saw Dr. Anaya and received palliative RT 2000cGy to T6 lesion and completed RT on 5/22/24. She took Xeloda for 3 months and did not tolerate. Xeloda was discontinued. She had repeat PET/CT in 9/16/24 which showed interval resolution of uptake in T6 but new uptake in L1 and new pleural effusion.  She completed XRT 5 fractions on 10/1/24 -10/7/24. She has been taking Letrozole. She feels some aches and pains.   2/20/25 Manoj is here for follow up visit. Her spouse is with her. She has h/o bilateral invasive lobular carcinoma of the breast, ER/TX positive and HER2/deana negative, s/p bilateral mastectomy, bilateral sentinel lymph node biopsy, and right axillary lymph node dissection in 1/2013, s/p adjuvant chemotherapy and adjuvant chest wall radiotherapy, Completed 10-year of endocrine therapy with Letrozole in 7/2023. She developed recurrent disease with a lesion in T6 in 2/2024. CT guided biopsy on 4/17/24 showed metastatic carcinoma consistent from breast primary. She saw Dr. Anaya and received palliative RT 2000cGy to T6 lesion and completed RT on 5/22/24. She took Xeloda for 3 months and did not tolerate. Xeloda was discontinued. She had repeat PET/CT in 9/16/24 which showed interval resolution of uptake in T6 but new uptake in L1 and new pleural effusion. She completed XRT 5 fractions on 10/1/24 -10/7/24. She has been taking Letrozole. PET/CT on 1/7/25 showed several new foci of pathologic FDG uptake in bones, suspicious for biologic tumor activity. Interval resolution of previously noted small right pleural effusion. She feels some aches and pains. She started weekly Taxol, 2 weeks on and 1 week off on 1/31/25, s/p cycle #1 and is tolerating well except for diarrhea yesterday, managed with Imodium. She denies nausea, vomiting, or numbness or tingling of hands/feet. She saw Dr Taylor and is planned for L1 kyphoplasty on 3/18/25.  3/13/25 Manoj is here for follow up visit. Her spouse is with her. She has h/o bilateral invasive lobular carcinoma of the breast, ER/TX positive and HER2/deana negative, s/p bilateral mastectomy, bilateral sentinel lymph node biopsy, and right axillary lymph node dissection in 1/2013, s/p adjuvant chemotherapy and adjuvant chest wall radiotherapy, Completed 10-year of endocrine therapy with Letrozole in 7/2023. She developed recurrent disease with a lesion in T6 in 2/2024. CT guided biopsy on 4/17/24 showed metastatic carcinoma consistent from breast primary. She saw Dr. Anaya and received palliative RT 2000cGy to T6 lesion and completed RT on 5/22/24. She took Xeloda for 3 months and did not tolerate. Xeloda was discontinued. She had repeat PET/CT in 9/16/24 which showed interval resolution of uptake in T6 but new uptake in L1 and new pleural effusion. She completed XRT 5 fractions on 10/1/24 -10/7/24. She has been taking Letrozole. PET/CT on 1/7/25 showed several new foci of pathologic FDG uptake in bones, suspicious for biologic tumor activity. She started weekly Taxol, 2 weeks on and 1 week off on 1/31/25, s/p 2 cycles and tolerated.    4/3/25 Manoj is here for follow up visit. Her spouse is with her. She has h/o bilateral invasive lobular carcinoma of the breast, ER/TX positive and HER2/deana negative, s/p bilateral mastectomy, bilateral sentinel lymph node biopsy, and right axillary lymph node dissection in 1/2013, s/p adjuvant chemotherapy and adjuvant chest wall radiotherapy, Completed 10-year of endocrine therapy with Letrozole in 7/2023. She developed recurrent disease with a lesion in T6 in 2/2024. CT guided biopsy on 4/17/24 showed metastatic carcinoma consistent from breast primary. She saw Dr. Anaya and received palliative RT 2000cGy to T6 lesion and completed RT on 5/22/24. She took Xeloda for 3 months and did not tolerate. Xeloda was discontinued. She had repeat PET/CT in 9/16/24 which showed interval resolution of uptake in T6 but new uptake in L1 and new pleural effusion. She completed XRT 5 fractions on 10/1/24 -10/7/24. She was taking Letrozole. PET/CT on 1/7/25 showed several new foci of pathologic FDG uptake in bones, suspicious for biologic tumor activity. She started weekly Taxol, 2 weeks on and 1 week off on 1/31/25, s/p 3 cycles and tolerated. She feels some numbness and tingling in her hands and feet. She does not have n/v/d, mouth sore, chill or fever.    4/16/25 Manoj is here for follow up visit. Her spouse is with her. She has h/o bilateral invasive lobular carcinoma of the breast, ER/TX positive and HER2/deana negative, s/p bilateral mastectomy, bilateral sentinel lymph node biopsy, and right axillary lymph node dissection in 1/2013, s/p adjuvant chemotherapy and adjuvant chest wall radiotherapy, Completed 10-year of endocrine therapy with Letrozole in 7/2023. She developed recurrent disease with a lesion in T6 in 2/2024. CT guided biopsy on 4/17/24 showed metastatic carcinoma consistent from breast primary. She saw Dr. Anaya and received palliative RT 2000cGy to T6 lesion and completed RT on 5/22/24. She took Xeloda for 3 months and did not tolerate. Xeloda was discontinued. She had repeat PET/CT in 9/16/24 which showed interval resolution of uptake in T6 but new uptake in L1 and new pleural effusion. She completed XRT 5 fractions on 10/1/24 -10/7/24. She was taking Letrozole. PET/CT on 1/7/25 showed several new foci of pathologic FDG uptake in bones, suspicious for biologic tumor activity. She started weekly Taxol, 2 weeks on and 1 week off on 1/31/25, s/p 3 cycles and tolerated. She feels some numbness and tingling in her hands and feet. She does not have n/v/d, mouth sore, chill or fever.    4/30/25 Manoj is here for follow up visit and chemotherapy. Her  is with her. She has h/o bilateral invasive lobular carcinoma of the breast, ER/TX positive and HER2/deana negative, s/p bilateral mastectomy, bilateral sentinel lymph node biopsy, and right axillary lymph node dissection in 1/2013, s/p adjuvant chemotherapy and adjuvant chest wall radiotherapy, Completed 10-year of endocrine therapy with Letrozole in 7/2023. She developed recurrent disease with a lesion in T6 in 2/2024. CT guided biopsy on 4/17/24 showed metastatic carcinoma consistent from breast primary. She received palliative RT 2000cGy to T6 lesion and completed RT on 5/22/24. She took Xeloda for 3 months and did not tolerate. Xeloda was discontinued. She had repeat PET/CT in 9/16/24 which showed interval resolution of uptake in T6 but new uptake in L1 and new pleural effusion. She completed XRT 5 fractions on 10/1/24 -10/7/24. She was taking Letrozole. PET/CT on 1/7/25 showed several new foci of pathologic FDG uptake in bones, suspicious for biologic tumor activity. She started weekly Taxol, 2 weeks on and 1 week off on 1/31/25, s/p 4 cycles and tolerated. She has mild neuropathy symptoms. She does not have n/v/d, mouth sore, chill or fever.

## 2025-05-06 NOTE — ASSESSMENT
[FreeTextEntry1] : Assessment and Plan: # H/O Bilateral invasive lobular carcinoma of the breast, ER/[place] positive and HER2/deana negative, status post bilateral mastectomy, bilateral sentinel lymph node biopsy, and right axillary lymph node dissection, status post adjuvant chemotherapy and adjuvant chest wall radiotherapy, Completed 10-year of endocrine therapy with Letrozole in 7/2023.  - S/P b/l mastectomy. There is no palpable abnormality.  # Recurrent breast cancer(ER/[place]/Her 2 negative Biopsy proven) with T6 vertebral body lesion S/P XRT(5/24) now presenting with new L1 lesion S/P XRT  9.24 - MRI spine on 3/11/24 showed a 2.7 cm lesion within the left aspect of the T6 vertebral body and 7 mm marrow replacing lesion within the T11 vertebral body.  - PET/CT on 3/28/24 showed Intense pathologic FDG uptake (SUV 9.3) coregistering with sclerotic lesion on the left side of T6 suspicious for biologic tumor activity. No other sites of abnormal FDG uptake.  - CT guided core bx of the lesion in T6 vertebral body on 4/17/24 showed metastatic carcinoma, favor breast primary, ER/[place] negative. Her2 is negative.  - S/P palliative RT 2000cGy to T6 lesion and completed RT on 5/22/24. - She took Xeloda for 3 months, 1500 mg twice a day, one week on and one week off. Due to frequent diarrhea, dose reduced to Xeloda 1000 mg q12h, 1 week on and 1 week off.  She developed multiple side effects and Xeloda was discontinued.  -- PET/CT in 9/16/24 showed interval resolution of uptake in T6 and New uptake in L1 and new pleural effusion.  -- MRI of lumbar spine 9/25/24 showed infiltrative enhancing bone marrow signal abnormality within the T11 and L1 vertebral bodies most compatible with osseous metastatic disease. Mild compression deformity of L1, likely pathologic. -- She received XRT 5 fractions 2000cGy to Vertebrae, Lumbar on 10/1/24 - 10/7/24. -- Recurrent breast cancer in the spine is triple negative. She had palliative RT x 2. There was no evidence of visceral disease or other site disease. Since her initial breast cancer was hormone receptor and bone met biopsy could have inadequate IHC staining for hormone receptor, she was given Letrozole.  -- Repeat PET/CT on 1/7/25 showed several new foci of pathologic FDG uptake in bones, suspicious for biologic tumor activity. Interval resolution of previously noted small right pleural effusion. -- In light of progression of disease, she switched to Taxol 80 mg weekly, 2 weeks on and one week off. S/P 4 cycles. Repeat PET/CT on 4/23/25 showed stable bony mets with SUV values trending down in some areas and trending up slightly in the other areas. There is no visceral mets.  -- Will continue weekly Taxol and proceed with the 5th cycle.  -- Continue X-Geva injection monthly. Dental clearance was done.  -- Mild elevated AST and ALT. Resolved. Will continue monitoring.  -- Order blood work: CBC, BMP, LFT, CEA and .  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 She saw neurosurgeon.   #Low back pain.  -- S/P L1 kyphoplasty on 4/11/25. Pain has reduced.  -- NSAIDs as needed.  -- F/U with Neurosurgery, Dr Taylor as scheduled.  # H/O elevated serum calcium. MM panel showed no evidence of M-spike. Serum light chain ration is normal. Repeat serum calcium is normal and PTH is also normal.

## 2025-05-06 NOTE — HISTORY OF PRESENT ILLNESS
[de-identified] : This 68-year-old female is here today for a followup visit.  She has a history of bilateral invasive lobular carcinoma, stage IIIA (pT1c N2a M0) in the right breast, and stage IA (pT1b N0 M0) in the left breast.  Both sided breast cancer were ER/CT positive and HER2/deana negative.  She had bilateral mastectomy, bilateral sentinel lymph node biopsy, and right axillary lymph node dissection at [facility] on 01/08/2013.  She received adjuvant chemotherapy with Adriamycin and Cytoxan followed by paclitaxel.  She also received post mastectomy adjuvant radiotherapy to right side chest wall.  She has been on adjuvant endocrine therapy with Femara since 7/2013  and has been tolerating well.  She had genetic test for BRCA mutations.  She is negative for BRCA1/2 mutations.  In 10/2014, she had a bone density at [facility], which showed osteopenia. Her latest bone density was done 7/21/16 which showed osteopenia. MRI of breasts was done on 1/30/17 which showed no MR evidence of malignancy in either breast. Recommendation: Unless otherwise indicated by clinical findings, annual screening mammography recommended. This can be alternated at 6 month intervals with bilateral screening breast MRI.\par   She has been taking calcium and vitamin D supplements. Latest colonoscopy 11/2016 pt states it was negative. Saw Dr. Garica 1/2017.\par  She saw Dr. Rankin recently. She is scheduled to have a revision of her lower abdominal fat necrosis on 1/2018.\par  \par   [de-identified] : 6/8/18: She stopped letrozole few months ago as she was found to be in Afib by her cardiologist on Pre-op clearance for abdominal fat necrosis. However her cardiologist () told her that you can resume Letrozole. She had surgery for abdominal fat necrosis and she is going for surgery for incisional hernia by .   12/13/18: The patient is here for followup visit. She has been taking Letrozole daily (since 7/2013) and tolerating well. She had hernia repair surgery. She complains some pain in her knees. She had MRI breast in 1 2017. There was no suspicious finding.  6/14/19; The patient is here for followup visit. She has been taking Letrozole daily (since 7/2013) and tolerating well. She complains some pain in her knees. She does not have other new complains. She has mild elevated calcium. PHT was normal.  12/13/2019 :  The patient is here for follow up. She has been on letrozole daily and tolerating well. She had a bone density 12/2/109 which showed osteopenia of femur and hip. She is not taking calcium and vit D since her last calcium was elevated 10.7. She had a recent US breasts 12/2019 for breast pain which was negative. She was seen by her cardiologist and had blood work in the summer. She was told that she has severe anemia. She started her on iron pills. She denies any vaginal bleeding or rectal bleeding  6/12/2020: The patient is here for follow up. She had bilateral invasive lobular carcinoma of the breast, ER/UT positive and HER2/deana negative, status post bilateral mastectomy, bilateral sentinel lymph node biopsy, and right axillary lymph node dissection in 1/2013, status post adjuvant chemotherapy and adjuvant chest wall radiotherapy, currently on adjuvant endocrine therapy with letrozole and tolerating well. She had a bone density 12/2/19 which showed osteopenia of femur and hip. She is not taking calcium and vit D since her last calcium was elevated 10.7. She had a recent US breasts 12/2019 for breast pain which was negative. She does not have new complains.  12/02/2020: MANOJ is here for follow up visit for bilateral invasive lobular carcinoma of the breast, ER/UT positive and HER2/deana negative, status post bilateral mastectomy, bilateral sentinel lymph node biopsy, and right axillary lymph node dissection in 1/2013, status post adjuvant chemotherapy and adjuvant chest wall radiotherapy, currently on adjuvant endocrine therapy with letrozole and tolerating well besides mild arthralgia. She had a bone density 12/2/19 which showed osteopenia of femur and hip. She continues on Vitamin D daily. She denies any new breast symptoms at this time. In addition, patient was diagnosed with COVID in 3/2020.   06/02/2021: MANOJ is here for follow up visit for bilateral invasive lobular carcinoma of the breast, ER/UT positive and HER2/deana negative, status post bilateral mastectomy, bilateral sentinel lymph node biopsy, and right axillary lymph node dissection in 1/2013, status post adjuvant chemotherapy and adjuvant chest wall radiotherapy, currently on adjuvant endocrine therapy with letrozole since 7/2013 and tolerating well besides mild arthralgia. She had a bone density 12/2/19 which showed osteopenia of femur and hip. She continues on Vitamin D daily. She denies any new breast symptoms at this time. she had right breast skin tag biopsied by dermatologist; which was benign.   1/5/22 MANOJ is here for follow up visit for bilateral invasive lobular carcinoma of the breast, ER/UT positive and HER2/deana negative, status post bilateral mastectomy, bilateral sentinel lymph node biopsy, and right axillary lymph node dissection in 1/2013, status post adjuvant chemotherapy and adjuvant chest wall radiotherapy, currently on adjuvant endocrine therapy with letrozole since 7/2013 and tolerating well besides mild arthralgia. She had a bone density 12/7/21 which showed worsening osteopenia of femur and hip, T score -2.4. She is not complaint with Vitamin D, does not take calcium due to mildly elevated Calcium. She denies any new breast symptoms at this time. She was experiencing lower abdominal and pelvic pain in Sept had US which was unremarkable. In addition, had Thyroid US due to hypercalcemia which was unremarkable, In 8/2021 TSH 2.36, Calcium 10, and PTH was 78.   7/21/22: Manoj is here for follow up visit for bilateral invasive lobular carcinoma of the breast, ER/UT positive and HER2/deana negative, status post bilateral mastectomy, bilateral sentinel lymph node biopsy, and right axillary lymph node dissection in 1/2013, status post adjuvant chemotherapy and adjuvant chest wall radiotherapy, currently on adjuvant endocrine therapy with letrozole since 7/2013 and tolerating well besides mild arthralgia. She had a bone density 12/7/21 which showed worsening osteopenia of femur and hip, T score -2.4. She takes Vitamin D but not calcium because her calcium has been mildly elevated. She was found to have large hiatal hernia and she has had on and off diarrhea for long time. She has been seen by Dr. Borjas.   01/26/2023 Manoj is here for follow up visit for bilateral invasive lobular carcinoma of the breast, ER/UT positive and HER2/deana negative, status post bilateral mastectomy, bilateral sentinel lymph node biopsy, and right axillary lymph node dissection in 1/2013, status post adjuvant chemotherapy and adjuvant chest wall radiotherapy, currently on adjuvant endocrine therapy with letrozole since 7/2013 and tolerating well besides mild arthralgia. She had a bone density 12/7/21 which showed worsening osteopenia of femur and hip, T score -2.4. She takes Vitamin D but not calcium because her calcium has been mildly elevated. She was found to have large hiatal hernia and surgery on 11/21/22. Her Hb drooped to 9.5 g/dl after surgery. She was found to have high serum calcium but repeat calcium was normal. PTH was also within normal limit.  7/20/23 Manoj is here for follow up visit for bilateral invasive lobular carcinoma of the breast, ER/UT positive and HER2/deana negative, status post bilateral mastectomy, bilateral sentinel lymph node biopsy, and right axillary lymph node dissection in 1/2013, status post adjuvant chemotherapy and adjuvant chest wall radiotherapy, currently on adjuvant endocrine therapy with letrozole since 7/2013 and tolerating well besides mild arthralgia. She had a bone density 12/7/21 which showed worsening osteopenia of femur and hip, T score -2.4. She takes Vitamin D but not calcium because her calcium has been mildly elevated. Repeat serum calcium normal, MM panel normal. She was found to have large hiatal hernia and surgery on 11/21/22. Her Hb drooped to 9.5 g/dl after surgery, now normal. After hernia repair she was having symptoms consistent with vagal nerve mediated gastroparesis, she then had endoscopic pyloromyotomy, symptoms resolved.   1/25/2024: Manoj is here for follow up visit for bilateral invasive lobular carcinoma of the breast, ER/UT positive and HER2/deana negative, status post bilateral mastectomy, bilateral sentinel lymph node biopsy, and right axillary lymph node dissection in 1/2013, status post adjuvant chemotherapy and adjuvant chest wall radiotherapy, currently on adjuvant endocrine therapy with letrozole since 7/2013 and tolerating well besides mild arthralgia. She Completed 10 years treatment of Letrozole in 7/2023. She had a bone density 12/8/23 Showed:         ----------------------------------------------------------------- AP Spine (L1-L4)         0.998   -0.4      1.8     Normal Femoral Neck (Left) 0.552   -2.7     -0.7     Osteoporosis Total Hip (Left) 0.676   -2.2     -0.6     Osteopenia -which showed Osteoporosis of femur Neck and Osteopenia in the Hip. She takes Vitamin D.  After hernia repair, she was having symptoms consistent with vagal nerve mediated gastroparesis, she then had endoscopic pyloromyotomy, symptoms resolved.  She starts feeling better now.   3/29/24: Manoj is here for follow up visit. She has h/o bilateral invasive lobular carcinoma of the breast, ER/UT positive and HER2/deana negative, status post bilateral mastectomy, bilateral sentinel lymph node biopsy, and right axillary lymph node dissection in 1/2013, status post adjuvant chemotherapy and adjuvant chest wall radiotherapy, currently on adjuvant endocrine therapy with letrozole since 7/2013. She had CT AP in 2/2024 for abdominal pain which incidentally showed a lesion in T6. She had MRI spine on 3/11/24 which showed a 2.7 cm lesion within the left aspect of the T6 vertebral body and 7 mm marrow replacing lesion within the T11 vertebral body.  PET/CT on 3/28/24 showed Intense pathologic FDG uptake (SUV 9.3) coregistering with sclerotic lesion on the left side of T6 suspicious for biologic tumor activity. No other sites of abnormal FDG uptake. She is here today to discuss further management plan.  4/29/24: Manoj is here for follow up visit. She has h/o bilateral invasive lobular carcinoma of the breast, ER/UT positive and HER2/deana negative, status post bilateral mastectomy, bilateral sentinel lymph node biopsy, and right axillary lymph node dissection in 1/2013, status post adjuvant chemotherapy and adjuvant chest wall radiotherapy, Completed 10-year of endocrine therapy with Letrozole in 7/2023. She had CT AP in 2/2024 for abdominal pain which incidentally showed a lesion in T6. She had MRI spine on 3/11/24 which showed a 2.7 cm lesion within the left aspect of the T6 vertebral body and 7 mm marrow replacing lesion within the T11 vertebral body.  PET/CT on 3/28/24 showed Intense pathologic FDG uptake (SUV 9.3) coregistering with sclerotic lesion on the left side of T6 suspicious for biologic tumor activity. No other sites of abnormal FDG uptake. She had CT guided biopsy on 4/17/24 and is here today to discuss the result.     5/30/24: Manoj is here for follow up visit. She has h/o bilateral invasive lobular carcinoma of the breast, ER/UT positive and HER2/deana negative, status post bilateral mastectomy, bilateral sentinel lymph node biopsy, and right axillary lymph node dissection in 1/2013, status post adjuvant chemotherapy and adjuvant chest wall radiotherapy, Completed 10-year of endocrine therapy with Letrozole in 7/2023. She had CT AP in 2/2024 for abdominal pain which incidentally showed a lesion in T6. She had MRI spine on 3/11/24 which showed a 2.7 cm lesion within the left aspect of the T6 vertebral body and 7 mm marrow replacing lesion within the T11 vertebral body.  PET/CT on 3/28/24 showed Intense pathologic FDG uptake (SUV 9.3) coregistering with sclerotic lesion on the left side of T6 suspicious for biologic tumor activity.  She had CT guided biopsy on 4/17/24 which showed metastatic carcinoma consistent from breast primary. She saw Dr. Anaya and received palliative RT 2000cGy to T6 lesion and completed RT on 5/22/24.  6/27/24: Manoj is here for follow up visit. She has h/o bilateral invasive lobular carcinoma of the breast, ER/UT positive and HER2/deana negative, status post bilateral mastectomy, bilateral sentinel lymph node biopsy, and right axillary lymph node dissection in 1/2013, status post adjuvant chemotherapy and adjuvant chest wall radiotherapy, Completed 10-year of endocrine therapy with Letrozole in 7/2023. She had CT AP in 2/2024 for abdominal pain which incidentally showed a lesion in T6. She had MRI spine on 3/11/24 which showed a 2.7 cm lesion within the left aspect of the T6 vertebral body and 7 mm marrow replacing lesion within the T11 vertebral body.  PET/CT on 3/28/24 showed Intense pathologic FDG uptake (SUV 9.3) coregistering with sclerotic lesion on the left side of T6 suspicious for biologic tumor activity.  She had CT guided biopsy on 4/17/24 which showed metastatic carcinoma consistent from breast primary. She saw Dr. Anaya and received palliative RT 2000cGy to T6 lesion and completed RT on 5/22/24. She started on Xeloda and had 1st week treatment. She had loose stool but otherwise tolerated well.   08/01/24: bilateral sentinel lymph node biopsy, and right axillary lymph node dissection in 1/2013, status post adjuvant chemotherapy and adjuvant chest wall radiotherapy, Completed 10-year of endocrine therapy with Letrozole in 7/2023. She had CT AP in 2/2024 for abdominal pain which incidentally showed a lesion in T6. She had MRI spine on 3/11/24 which showed a 2.7 cm lesion within the left aspect of the T6 vertebral body and 7 mm marrow replacing lesion within the T11 vertebral body.  PET/CT on 3/28/24 showed Intense pathologic FDG uptake (SUV 9.3) coregistering with sclerotic lesion on the left side of T6 suspicious for biologic tumor activity.  She had CT guided biopsy on 4/17/24 which showed metastatic carcinoma consistent from breast primary. She saw Dr. Anaya and received palliative RT 2000cGy to T6 lesion and completed RT on 5/22/24. She started on Xeloda and had 1st week of June 2024. She is complaining of persistence diarrhea despite Imodium intake, currently on Xeloda 3tabs every 12 hrs for one week on and one week off.   8/29/24: Manoj is here for follow up visit. She has h/o bilateral invasive lobular carcinoma of the breast, ER/UT positive and HER2/deana negative, status post bilateral mastectomy, bilateral sentinel lymph node biopsy, and right axillary lymph node dissection in 1/2013, status post adjuvant chemotherapy and adjuvant chest wall radiotherapy, Completed 10-year of endocrine therapy with Letrozole in 7/2023. She had CT AP in 2/2024 for abdominal pain which incidentally showed a lesion in T6. She had MRI spine on 3/11/24 which showed a 2.7 cm lesion within the left aspect of the T6 vertebral body and 7 mm marrow replacing lesion within the T11 vertebral body.  PET/CT on 3/28/24 showed Intense pathologic FDG uptake (SUV 9.3) coregistering with sclerotic lesion on the left side of T6 suspicious for biologic tumor activity.  She had CT guided biopsy on 4/17/24 which showed metastatic carcinoma consistent from breast primary. She saw Dr. Anaya and received palliative RT 2000cGy to T6 lesion and completed RT on 5/22/24. She has been on Xeloda and did not tolerate well. She complains diarrhea, weight loss and eye irritation.   10/10/24 Manoj is here for follow up visit. She has h/o bilateral invasive lobular carcinoma of the breast, ER/UT positive and HER2/deana negative, status post bilateral mastectomy, bilateral sentinel lymph node biopsy, and right axillary lymph node dissection in 1/2013, status post adjuvant chemotherapy and adjuvant chest wall radiotherapy, Completed 10-year of endocrine therapy with Letrozole in 7/2023. She had CT AP in 2/2024 for abdominal pain which incidentally showed a lesion in T6. She had MRI spine on 3/11/24 which showed a 2.7 cm lesion within the left aspect of the T6 vertebral body and 7 mm marrow replacing lesion within the T11 vertebral body.  PET/CT on 3/28/24 showed Intense pathologic FDG uptake (SUV 9.3) coregistering with sclerotic lesion on the left side of T6 suspicious for biologic tumor activity.  She had CT guided biopsy on 4/17/24 which showed metastatic carcinoma consistent from breast primary. She saw Dr. Anaya and received palliative RT 2000cGy to T6 lesion and completed RT on 5/22/24. She took Xeloda for 3 months and did not tolerate well. Xeloda was discontinued. She had a PET scan in 9.24 which showed interval resolution of uptake in T6 and New uptake in L1 and new pleural effussion. She completed XRT 5 fractions in 9.24.  She is here for follow up.  11/11/24 Manoj is here for follow up visit. She has h/o bilateral invasive lobular carcinoma of the breast, ER/UT positive and HER2/deana negative, status post bilateral mastectomy, bilateral sentinel lymph node biopsy, and right axillary lymph node dissection in 1/2013, status post adjuvant chemotherapy and adjuvant chest wall radiotherapy, Completed 10-year of endocrine therapy with Letrozole in 7/2023. She developed recurrent disease with a lesion in T6 in 2/2024. CT guided biopsy on 4/17/24 showed metastatic carcinoma consistent from breast primary. She saw Dr. Anaya and received palliative RT 2000cGy to T6 lesion and completed RT on 5/22/24. She took Xeloda for 3 months and did not tolerate well. Xeloda was discontinued. She had repeat PET/CT in 9.24 which showed interval resolution of uptake in T6 but new uptake in L1 and new pleural effusion. She completed XRT 5 fractions. She complains low back pain.   12/30/24 Manoj is here for follow up visit. Her spouse is with her. She has h/o bilateral invasive lobular carcinoma of the breast, ER/UT positive and HER2/deana negative, s/p bilateral mastectomy, bilateral sentinel lymph node biopsy, and right axillary lymph node dissection in 1/2013, s/p adjuvant chemotherapy and adjuvant chest wall radiotherapy, Completed 10-year of endocrine therapy with Letrozole in 7/2023. She developed recurrent disease with a lesion in T6 in 2/2024. CT guided biopsy on 4/17/24 showed metastatic carcinoma consistent from breast primary. She saw Dr. Anaya and received palliative RT 2000cGy to T6 lesion and completed RT on 5/22/24. She took Xeloda for 3 months and did not tolerate well. Xeloda was discontinued. She had repeat PET/CT in 9/16/24 which showed interval resolution of uptake in T6 but new uptake in L1 and new pleural effusion. MR Lumbar spine on 9/25/24 showed Infiltrative enhancing bone marrow signal abnormality within the T11 and L1 vertebral bodies most compatible with osseous metastatic disease. Mild compression deformity of L1, likely pathologic. Multilevel degenerative changes as described. She completed XRT 5 fractions on 10/1/24 -10/7/24. She has a history of fall on 9/2024. CT chest 12/12/24 showed no pleural effusion. She complains low back pain, taking Aleeve as needed with relief.  1/14/25: Manoj is here for follow up visit. Her spouse is with her. She has h/o bilateral invasive lobular carcinoma of the breast, ER/UT positive and HER2/deana negative, s/p bilateral mastectomy, bilateral sentinel lymph node biopsy, and right axillary lymph node dissection in 1/2013, s/p adjuvant chemotherapy and adjuvant chest wall radiotherapy, Completed 10-year of endocrine therapy with Letrozole in 7/2023. She developed recurrent disease with a lesion in T6 in 2/2024. CT guided biopsy on 4/17/24 showed metastatic carcinoma consistent from breast primary. She saw Dr. Anaya and received palliative RT 2000cGy to T6 lesion and completed RT on 5/22/24. She took Xeloda for 3 months and did not tolerate. Xeloda was discontinued. She had repeat PET/CT in 9/16/24 which showed interval resolution of uptake in T6 but new uptake in L1 and new pleural effusion.  She completed XRT 5 fractions on 10/1/24 -10/7/24. She has been taking Letrozole. She feels some aches and pains.   2/20/25 Manoj is here for follow up visit. Her spouse is with her. She has h/o bilateral invasive lobular carcinoma of the breast, ER/UT positive and HER2/deana negative, s/p bilateral mastectomy, bilateral sentinel lymph node biopsy, and right axillary lymph node dissection in 1/2013, s/p adjuvant chemotherapy and adjuvant chest wall radiotherapy, Completed 10-year of endocrine therapy with Letrozole in 7/2023. She developed recurrent disease with a lesion in T6 in 2/2024. CT guided biopsy on 4/17/24 showed metastatic carcinoma consistent from breast primary. She saw Dr. Anaya and received palliative RT 2000cGy to T6 lesion and completed RT on 5/22/24. She took Xeloda for 3 months and did not tolerate. Xeloda was discontinued. She had repeat PET/CT in 9/16/24 which showed interval resolution of uptake in T6 but new uptake in L1 and new pleural effusion. She completed XRT 5 fractions on 10/1/24 -10/7/24. She has been taking Letrozole. PET/CT on 1/7/25 showed several new foci of pathologic FDG uptake in bones, suspicious for biologic tumor activity. Interval resolution of previously noted small right pleural effusion. She feels some aches and pains. She started weekly Taxol, 2 weeks on and 1 week off on 1/31/25, s/p cycle #1 and is tolerating well except for diarrhea yesterday, managed with Imodium. She denies nausea, vomiting, or numbness or tingling of hands/feet. She saw Dr Taylor and is planned for L1 kyphoplasty on 3/18/25.  3/13/25 Manoj is here for follow up visit. Her spouse is with her. She has h/o bilateral invasive lobular carcinoma of the breast, ER/UT positive and HER2/deana negative, s/p bilateral mastectomy, bilateral sentinel lymph node biopsy, and right axillary lymph node dissection in 1/2013, s/p adjuvant chemotherapy and adjuvant chest wall radiotherapy, Completed 10-year of endocrine therapy with Letrozole in 7/2023. She developed recurrent disease with a lesion in T6 in 2/2024. CT guided biopsy on 4/17/24 showed metastatic carcinoma consistent from breast primary. She saw Dr. Anaya and received palliative RT 2000cGy to T6 lesion and completed RT on 5/22/24. She took Xeloda for 3 months and did not tolerate. Xeloda was discontinued. She had repeat PET/CT in 9/16/24 which showed interval resolution of uptake in T6 but new uptake in L1 and new pleural effusion. She completed XRT 5 fractions on 10/1/24 -10/7/24. She has been taking Letrozole. PET/CT on 1/7/25 showed several new foci of pathologic FDG uptake in bones, suspicious for biologic tumor activity. She started weekly Taxol, 2 weeks on and 1 week off on 1/31/25, s/p 2 cycles and tolerated.    4/3/25 Manoj is here for follow up visit. Her spouse is with her. She has h/o bilateral invasive lobular carcinoma of the breast, ER/UT positive and HER2/deana negative, s/p bilateral mastectomy, bilateral sentinel lymph node biopsy, and right axillary lymph node dissection in 1/2013, s/p adjuvant chemotherapy and adjuvant chest wall radiotherapy, Completed 10-year of endocrine therapy with Letrozole in 7/2023. She developed recurrent disease with a lesion in T6 in 2/2024. CT guided biopsy on 4/17/24 showed metastatic carcinoma consistent from breast primary. She saw Dr. Anaya and received palliative RT 2000cGy to T6 lesion and completed RT on 5/22/24. She took Xeloda for 3 months and did not tolerate. Xeloda was discontinued. She had repeat PET/CT in 9/16/24 which showed interval resolution of uptake in T6 but new uptake in L1 and new pleural effusion. She completed XRT 5 fractions on 10/1/24 -10/7/24. She was taking Letrozole. PET/CT on 1/7/25 showed several new foci of pathologic FDG uptake in bones, suspicious for biologic tumor activity. She started weekly Taxol, 2 weeks on and 1 week off on 1/31/25, s/p 3 cycles and tolerated. She feels some numbness and tingling in her hands and feet. She does not have n/v/d, mouth sore, chill or fever.    4/16/25 Manoj is here for follow up visit. Her spouse is with her. She has h/o bilateral invasive lobular carcinoma of the breast, ER/UT positive and HER2/deana negative, s/p bilateral mastectomy, bilateral sentinel lymph node biopsy, and right axillary lymph node dissection in 1/2013, s/p adjuvant chemotherapy and adjuvant chest wall radiotherapy, Completed 10-year of endocrine therapy with Letrozole in 7/2023. She developed recurrent disease with a lesion in T6 in 2/2024. CT guided biopsy on 4/17/24 showed metastatic carcinoma consistent from breast primary. She saw Dr. Anaya and received palliative RT 2000cGy to T6 lesion and completed RT on 5/22/24. She took Xeloda for 3 months and did not tolerate. Xeloda was discontinued. She had repeat PET/CT in 9/16/24 which showed interval resolution of uptake in T6 but new uptake in L1 and new pleural effusion. She completed XRT 5 fractions on 10/1/24 -10/7/24. She was taking Letrozole. PET/CT on 1/7/25 showed several new foci of pathologic FDG uptake in bones, suspicious for biologic tumor activity. She started weekly Taxol, 2 weeks on and 1 week off on 1/31/25, s/p 3 cycles and tolerated. She feels some numbness and tingling in her hands and feet. She does not have n/v/d, mouth sore, chill or fever.    4/30/25 Manoj is here for follow up visit and chemotherapy. Her  is with her. She has h/o bilateral invasive lobular carcinoma of the breast, ER/UT positive and HER2/deana negative, s/p bilateral mastectomy, bilateral sentinel lymph node biopsy, and right axillary lymph node dissection in 1/2013, s/p adjuvant chemotherapy and adjuvant chest wall radiotherapy, Completed 10-year of endocrine therapy with Letrozole in 7/2023. She developed recurrent disease with a lesion in T6 in 2/2024. CT guided biopsy on 4/17/24 showed metastatic carcinoma consistent from breast primary. She received palliative RT 2000cGy to T6 lesion and completed RT on 5/22/24. She took Xeloda for 3 months and did not tolerate. Xeloda was discontinued. She had repeat PET/CT in 9/16/24 which showed interval resolution of uptake in T6 but new uptake in L1 and new pleural effusion. She completed XRT 5 fractions on 10/1/24 -10/7/24. She was taking Letrozole. PET/CT on 1/7/25 showed several new foci of pathologic FDG uptake in bones, suspicious for biologic tumor activity. She started weekly Taxol, 2 weeks on and 1 week off on 1/31/25, s/p 4 cycles and tolerated. She has mild neuropathy symptoms. She does not have n/v/d, mouth sore, chill or fever.

## 2025-05-06 NOTE — PHYSICAL EXAM
[Fully active, able to carry on all pre-disease performance without restriction] : Status 0 - Fully active, able to carry on all pre-disease performance without restriction [Normal] : affect appropriate [de-identified] :  Status post bilateral mastectomy and autologous tissue reconstruction. There is no skin lesion and no palpable axillary lymphadenopathy. [de-identified] : Lower abdominal fat necrosis. Abdominal scar present.  [de-identified] : Low back pain 2' metastatic disease.

## 2025-05-06 NOTE — PHYSICAL EXAM
[Fully active, able to carry on all pre-disease performance without restriction] : Status 0 - Fully active, able to carry on all pre-disease performance without restriction [Normal] : affect appropriate [de-identified] :  Status post bilateral mastectomy and autologous tissue reconstruction. There is no skin lesion and no palpable axillary lymphadenopathy. [de-identified] : Lower abdominal fat necrosis. Abdominal scar present.  [de-identified] : Low back pain 2' metastatic disease.

## 2025-05-09 NOTE — VITALS
[Maximal Pain Intensity: 7/10] : 7/10 [Least Pain Intensity: 0/10] : 0/10 [Pain Duration: ___] : Pain duration: [unfilled] [Pain Location: ___] : Pain Location: [unfilled] [OTC] : OTC [60: Requires occasional assistance, but is able to care for most of his/her needs] : 60: Requires occasional assistance, but is able to care for most of his/her needs [70: Cares for self; unalbe to carry on normal activity or do active work.] : 70: Cares for self; unable to carry on normal activity or do active work.

## 2025-05-09 NOTE — PHYSICAL EXAM
[General Appearance - Alert] : alert [General Appearance - In No Acute Distress] : in no acute distress [Sclera] : the sclera and conjunctiva were normal [Hearing Threshold Finger Rub Not [place]] : hearing was normal [Heart Rate And Rhythm] : heart rate and rhythm were normal [Nail Clubbing] : no clubbing  or cyanosis of the fingernails [Normal] : oriented to person, place and time, the affect was normal, the mood was normal and not anxious [Nondistended] : nondistended

## 2025-05-09 NOTE — HISTORY OF PRESENT ILLNESS
[FreeTextEntry1] : MANOJ ÁLVAREZ returns to clinic in follow up visit.  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 She received prior right chest wall radiation over 10 years ago.  She was noted to have spine mets at T6. The patient received 2000 cGy to the Spine (T5-T7) from 5/16/2024 through 5/22/2024.  She later had FDG avidity with a compression deformity at L1 and T11. She was symptomatic. MRI confirmed metastasis. The patient was treated to the involved spine  using a Complex technique.  The patient tolerated treatments quite well.  The patient received 2,000 cGy to the spine (T10-L2) from 10/1/2024 through 10/7/2024.  I last saw her in Nov 2024.    In the interim, the patient is s/p L1 kyphoplasty on 4/10/2025 with Dr. Ulrich, pathology consistent with fracture. Letrozole d/c, started weekly Taxol, 2 weeks on and 1 week off on 1/31/25, managed by Dr. Tovar.  Patient reports lower back pain improved but not disappeared, when bending down 7/10 on pain scale, Motrin helps.  Her pain in the mid upper back (t-spine) has resolved.   She will sporadically has left hip tolerable pain.  Otherwise, no new concerns.  She is accompanied by her spouse.      4/23/2025 PET CT COMPARISON: PET/CT dated 1/7/2025 shows, stable metabolic disease. Pathological focal FDG uptake within the left sacrum and left proximal femoral shaft suspicious for biologic tumor activity, stable metabolic diseases. Moderate compression deformity involving the L1 vertebral body, and mildly FDG avid sclerotic lesion within the T11 vertebral body are stable metabolic disease. No other new sites of abnormal FDG avidity suspicious for metastatic diseases.  Upcoming appointments:  Dr. Tovar 5/21/2025 ACP breast Sx 10/1/2025

## 2025-05-09 NOTE — REVIEW OF SYSTEMS
[Fatigue] : fatigue [Joint Pain] : joint pain [Muscle Pain] : muscle pain [Negative] : Psychiatric [Fever] : no fever [Chills] : no chills [Chest Pain] : no chest pain [Shortness Of Breath] : no shortness of breath [Confused] : no confusion

## 2025-05-09 NOTE — LETTER CLOSING
[Consult Closing:] : Thank you for allowing me to participate in the care of this patient.  If you have any questions, please do not hesitate to contact me. [Sincerely yours,] : Sincerely yours, [FreeTextEntry3] : Tiarra Anaya M.D.   Electronically proofread and signed by:  Tiarra Anaya MD Attending, Department of Radiation Medicine [facility]

## 2025-05-09 NOTE — DISEASE MANAGEMENT
[Clinical] : TNM Stage: c [IV] : IV [de-identified] : 800cGy [de-identified] : 2000cGy [FreeTextEntry4] : Breast cancer with bone metastasis [TTNM] : x [NTNM] : x [MTNM] : 1 [de-identified] : Vertebrae, Lumbar

## 2025-05-09 NOTE — LETTER CLOSING
[Consult Closing:] : Thank you for allowing me to participate in the care of this patient.  If you have any questions, please do not hesitate to contact me. [Sincerely yours,] : Sincerely yours, [FreeTextEntry3] : Tiarra Anaya M.D.   Electronically proofread and signed by:  Tiarra Anaya MD Attending, Department of Radiation Medicine [facility]

## 2025-05-09 NOTE — PHYSICAL EXAM
[General Appearance - Alert] : alert [General Appearance - In No Acute Distress] : in no acute distress [Sclera] : the sclera and conjunctiva were normal [Hearing Threshold Finger Rub Not [place]] : hearing was normal [Heart Rate And Rhythm] : heart rate and rhythm were normal [Nail Clubbing] : no clubbing  or cyanosis of the fingernails [Normal] : oriented to person, place and time, the affect was normal, the mood was normal and not anxious [Nondistended] : nondistended

## 2025-05-09 NOTE — DISEASE MANAGEMENT
[Clinical] : TNM Stage: c [IV] : IV [de-identified] : 800cGy [de-identified] : 2000cGy [FreeTextEntry4] : Breast cancer with bone metastasis [TTNM] : x [NTNM] : x [MTNM] : 1 [de-identified] : Vertebrae, Lumbar

## 2025-05-27 NOTE — HISTORY OF PRESENT ILLNESS
[de-identified] : This 68-year-old female is here today for a followup visit.  She has a history of bilateral invasive lobular carcinoma, stage IIIA (pT1c N2a M0) in the right breast, and stage IA (pT1b N0 M0) in the left breast.  Both sided breast cancer were ER/AR positive and HER2/deana negative.  She had bilateral mastectomy, bilateral sentinel lymph node biopsy, and right axillary lymph node dissection at [facility] on 01/08/2013.  She received adjuvant chemotherapy with Adriamycin and Cytoxan followed by paclitaxel.  She also received post mastectomy adjuvant radiotherapy to right side chest wall.  She has been on adjuvant endocrine therapy with Femara since 7/2013  and has been tolerating well.  She had genetic test for BRCA mutations.  She is negative for BRCA1/2 mutations.  In 10/2014, she had a bone density at [facility], which showed osteopenia. Her latest bone density was done 7/21/16 which showed osteopenia. MRI of breasts was done on 1/30/17 which showed no MR evidence of malignancy in either breast. Recommendation: Unless otherwise indicated by clinical findings, annual screening mammography recommended. This can be alternated at 6 month intervals with bilateral screening breast MRI.\par   She has been taking calcium and vitamin D supplements. Latest colonoscopy 11/2016 pt states it was negative. Saw Dr. Garcia 1/2017.\par  She saw Dr. Rankin recently. She is scheduled to have a revision of her lower abdominal fat necrosis on 1/2018.\par  \par   [de-identified] : 6/8/18: She stopped letrozole few months ago as she was found to be in Afib by her cardiologist on Pre-op clearance for abdominal fat necrosis. However her cardiologist () told her that you can resume Letrozole. She had surgery for abdominal fat necrosis and she is going for surgery for incisional hernia by .   12/13/18: The patient is here for followup visit. She has been taking Letrozole daily (since 7/2013) and tolerating well. She had hernia repair surgery. She complains some pain in her knees. She had MRI breast in 1 2017. There was no suspicious finding.  6/14/19; The patient is here for followup visit. She has been taking Letrozole daily (since 7/2013) and tolerating well. She complains some pain in her knees. She does not have other new complains. She has mild elevated calcium. PHT was normal.  12/13/2019 :  The patient is here for follow up. She has been on letrozole daily and tolerating well. She had a bone density 12/2/109 which showed osteopenia of femur and hip. She is not taking calcium and vit D since her last calcium was elevated 10.7. She had a recent US breasts 12/2019 for breast pain which was negative. She was seen by her cardiologist and had blood work in the summer. She was told that she has severe anemia. She started her on iron pills. She denies any vaginal bleeding or rectal bleeding  6/12/2020: The patient is here for follow up. She had bilateral invasive lobular carcinoma of the breast, ER/DC positive and HER2/deana negative, status post bilateral mastectomy, bilateral sentinel lymph node biopsy, and right axillary lymph node dissection in 1/2013, status post adjuvant chemotherapy and adjuvant chest wall radiotherapy, currently on adjuvant endocrine therapy with letrozole and tolerating well. She had a bone density 12/2/19 which showed osteopenia of femur and hip. She is not taking calcium and vit D since her last calcium was elevated 10.7. She had a recent US breasts 12/2019 for breast pain which was negative. She does not have new complains.  12/02/2020: MANOJ is here for follow up visit for bilateral invasive lobular carcinoma of the breast, ER/DC positive and HER2/deana negative, status post bilateral mastectomy, bilateral sentinel lymph node biopsy, and right axillary lymph node dissection in 1/2013, status post adjuvant chemotherapy and adjuvant chest wall radiotherapy, currently on adjuvant endocrine therapy with letrozole and tolerating well besides mild arthralgia. She had a bone density 12/2/19 which showed osteopenia of femur and hip. She continues on Vitamin D daily. She denies any new breast symptoms at this time. In addition, patient was diagnosed with COVID in 3/2020.   06/02/2021: MANOJ is here for follow up visit for bilateral invasive lobular carcinoma of the breast, ER/DC positive and HER2/deana negative, status post bilateral mastectomy, bilateral sentinel lymph node biopsy, and right axillary lymph node dissection in 1/2013, status post adjuvant chemotherapy and adjuvant chest wall radiotherapy, currently on adjuvant endocrine therapy with letrozole since 7/2013 and tolerating well besides mild arthralgia. She had a bone density 12/2/19 which showed osteopenia of femur and hip. She continues on Vitamin D daily. She denies any new breast symptoms at this time. she had right breast skin tag biopsied by dermatologist; which was benign.   1/5/22 MANOJ is here for follow up visit for bilateral invasive lobular carcinoma of the breast, ER/DC positive and HER2/deana negative, status post bilateral mastectomy, bilateral sentinel lymph node biopsy, and right axillary lymph node dissection in 1/2013, status post adjuvant chemotherapy and adjuvant chest wall radiotherapy, currently on adjuvant endocrine therapy with letrozole since 7/2013 and tolerating well besides mild arthralgia. She had a bone density 12/7/21 which showed worsening osteopenia of femur and hip, T score -2.4. She is not complaint with Vitamin D, does not take calcium due to mildly elevated Calcium. She denies any new breast symptoms at this time. She was experiencing lower abdominal and pelvic pain in Sept had US which was unremarkable. In addition, had Thyroid US due to hypercalcemia which was unremarkable, In 8/2021 TSH 2.36, Calcium 10, and PTH was 78.   7/21/22: Manoj is here for follow up visit for bilateral invasive lobular carcinoma of the breast, ER/DC positive and HER2/deana negative, status post bilateral mastectomy, bilateral sentinel lymph node biopsy, and right axillary lymph node dissection in 1/2013, status post adjuvant chemotherapy and adjuvant chest wall radiotherapy, currently on adjuvant endocrine therapy with letrozole since 7/2013 and tolerating well besides mild arthralgia. She had a bone density 12/7/21 which showed worsening osteopenia of femur and hip, T score -2.4. She takes Vitamin D but not calcium because her calcium has been mildly elevated. She was found to have large hiatal hernia and she has had on and off diarrhea for long time. She has been seen by Dr. Borjas.   01/26/2023 Manoj is here for follow up visit for bilateral invasive lobular carcinoma of the breast, ER/DC positive and HER2/deana negative, status post bilateral mastectomy, bilateral sentinel lymph node biopsy, and right axillary lymph node dissection in 1/2013, status post adjuvant chemotherapy and adjuvant chest wall radiotherapy, currently on adjuvant endocrine therapy with letrozole since 7/2013 and tolerating well besides mild arthralgia. She had a bone density 12/7/21 which showed worsening osteopenia of femur and hip, T score -2.4. She takes Vitamin D but not calcium because her calcium has been mildly elevated. She was found to have large hiatal hernia and surgery on 11/21/22. Her Hb drooped to 9.5 g/dl after surgery. She was found to have high serum calcium but repeat calcium was normal. PTH was also within normal limit.  7/20/23 Manoj is here for follow up visit for bilateral invasive lobular carcinoma of the breast, ER/DC positive and HER2/deana negative, status post bilateral mastectomy, bilateral sentinel lymph node biopsy, and right axillary lymph node dissection in 1/2013, status post adjuvant chemotherapy and adjuvant chest wall radiotherapy, currently on adjuvant endocrine therapy with letrozole since 7/2013 and tolerating well besides mild arthralgia. She had a bone density 12/7/21 which showed worsening osteopenia of femur and hip, T score -2.4. She takes Vitamin D but not calcium because her calcium has been mildly elevated. Repeat serum calcium normal, MM panel normal. She was found to have large hiatal hernia and surgery on 11/21/22. Her Hb drooped to 9.5 g/dl after surgery, now normal. After hernia repair she was having symptoms consistent with vagal nerve mediated gastroparesis, she then had endoscopic pyloromyotomy, symptoms resolved.   1/25/2024: Manoj is here for follow up visit for bilateral invasive lobular carcinoma of the breast, ER/DC positive and HER2/deana negative, status post bilateral mastectomy, bilateral sentinel lymph node biopsy, and right axillary lymph node dissection in 1/2013, status post adjuvant chemotherapy and adjuvant chest wall radiotherapy, currently on adjuvant endocrine therapy with letrozole since 7/2013 and tolerating well besides mild arthralgia. She Completed 10 years treatment of Letrozole in 7/2023. She had a bone density 12/8/23 Showed:         ----------------------------------------------------------------- AP Spine (L1-L4)         0.998   -0.4      1.8     Normal Femoral Neck (Left) 0.552   -2.7     -0.7     Osteoporosis Total Hip (Left) 0.676   -2.2     -0.6     Osteopenia -which showed Osteoporosis of femur Neck and Osteopenia in the Hip. She takes Vitamin D.  After hernia repair, she was having symptoms consistent with vagal nerve mediated gastroparesis, she then had endoscopic pyloromyotomy, symptoms resolved.  She starts feeling better now.   3/29/24: Manoj is here for follow up visit. She has h/o bilateral invasive lobular carcinoma of the breast, ER/DC positive and HER2/deana negative, status post bilateral mastectomy, bilateral sentinel lymph node biopsy, and right axillary lymph node dissection in 1/2013, status post adjuvant chemotherapy and adjuvant chest wall radiotherapy, currently on adjuvant endocrine therapy with letrozole since 7/2013. She had CT AP in 2/2024 for abdominal pain which incidentally showed a lesion in T6. She had MRI spine on 3/11/24 which showed a 2.7 cm lesion within the left aspect of the T6 vertebral body and 7 mm marrow replacing lesion within the T11 vertebral body.  PET/CT on 3/28/24 showed Intense pathologic FDG uptake (SUV 9.3) coregistering with sclerotic lesion on the left side of T6 suspicious for biologic tumor activity. No other sites of abnormal FDG uptake. She is here today to discuss further management plan.  4/29/24: Manoj is here for follow up visit. She has h/o bilateral invasive lobular carcinoma of the breast, ER/DC positive and HER2/deana negative, status post bilateral mastectomy, bilateral sentinel lymph node biopsy, and right axillary lymph node dissection in 1/2013, status post adjuvant chemotherapy and adjuvant chest wall radiotherapy, Completed 10-year of endocrine therapy with Letrozole in 7/2023. She had CT AP in 2/2024 for abdominal pain which incidentally showed a lesion in T6. She had MRI spine on 3/11/24 which showed a 2.7 cm lesion within the left aspect of the T6 vertebral body and 7 mm marrow replacing lesion within the T11 vertebral body.  PET/CT on 3/28/24 showed Intense pathologic FDG uptake (SUV 9.3) coregistering with sclerotic lesion on the left side of T6 suspicious for biologic tumor activity. No other sites of abnormal FDG uptake. She had CT guided biopsy on 4/17/24 and is here today to discuss the result.     5/30/24: Manoj is here for follow up visit. She has h/o bilateral invasive lobular carcinoma of the breast, ER/DC positive and HER2/deana negative, status post bilateral mastectomy, bilateral sentinel lymph node biopsy, and right axillary lymph node dissection in 1/2013, status post adjuvant chemotherapy and adjuvant chest wall radiotherapy, Completed 10-year of endocrine therapy with Letrozole in 7/2023. She had CT AP in 2/2024 for abdominal pain which incidentally showed a lesion in T6. She had MRI spine on 3/11/24 which showed a 2.7 cm lesion within the left aspect of the T6 vertebral body and 7 mm marrow replacing lesion within the T11 vertebral body.  PET/CT on 3/28/24 showed Intense pathologic FDG uptake (SUV 9.3) coregistering with sclerotic lesion on the left side of T6 suspicious for biologic tumor activity.  She had CT guided biopsy on 4/17/24 which showed metastatic carcinoma consistent from breast primary. She saw Dr. Anaya and received palliative RT 2000cGy to T6 lesion and completed RT on 5/22/24.  6/27/24: Manoj is here for follow up visit. She has h/o bilateral invasive lobular carcinoma of the breast, ER/DC positive and HER2/deana negative, status post bilateral mastectomy, bilateral sentinel lymph node biopsy, and right axillary lymph node dissection in 1/2013, status post adjuvant chemotherapy and adjuvant chest wall radiotherapy, Completed 10-year of endocrine therapy with Letrozole in 7/2023. She had CT AP in 2/2024 for abdominal pain which incidentally showed a lesion in T6. She had MRI spine on 3/11/24 which showed a 2.7 cm lesion within the left aspect of the T6 vertebral body and 7 mm marrow replacing lesion within the T11 vertebral body.  PET/CT on 3/28/24 showed Intense pathologic FDG uptake (SUV 9.3) coregistering with sclerotic lesion on the left side of T6 suspicious for biologic tumor activity.  She had CT guided biopsy on 4/17/24 which showed metastatic carcinoma consistent from breast primary. She saw Dr. Anaya and received palliative RT 2000cGy to T6 lesion and completed RT on 5/22/24. She started on Xeloda and had 1st week treatment. She had loose stool but otherwise tolerated well.   08/01/24: bilateral sentinel lymph node biopsy, and right axillary lymph node dissection in 1/2013, status post adjuvant chemotherapy and adjuvant chest wall radiotherapy, Completed 10-year of endocrine therapy with Letrozole in 7/2023. She had CT AP in 2/2024 for abdominal pain which incidentally showed a lesion in T6. She had MRI spine on 3/11/24 which showed a 2.7 cm lesion within the left aspect of the T6 vertebral body and 7 mm marrow replacing lesion within the T11 vertebral body.  PET/CT on 3/28/24 showed Intense pathologic FDG uptake (SUV 9.3) coregistering with sclerotic lesion on the left side of T6 suspicious for biologic tumor activity.  She had CT guided biopsy on 4/17/24 which showed metastatic carcinoma consistent from breast primary. She saw Dr. Anaya and received palliative RT 2000cGy to T6 lesion and completed RT on 5/22/24. She started on Xeloda and had 1st week of June 2024. She is complaining of persistence diarrhea despite Imodium intake, currently on Xeloda 3tabs every 12 hrs for one week on and one week off.   8/29/24: Manoj is here for follow up visit. She has h/o bilateral invasive lobular carcinoma of the breast, ER/DC positive and HER2/deana negative, status post bilateral mastectomy, bilateral sentinel lymph node biopsy, and right axillary lymph node dissection in 1/2013, status post adjuvant chemotherapy and adjuvant chest wall radiotherapy, Completed 10-year of endocrine therapy with Letrozole in 7/2023. She had CT AP in 2/2024 for abdominal pain which incidentally showed a lesion in T6. She had MRI spine on 3/11/24 which showed a 2.7 cm lesion within the left aspect of the T6 vertebral body and 7 mm marrow replacing lesion within the T11 vertebral body.  PET/CT on 3/28/24 showed Intense pathologic FDG uptake (SUV 9.3) coregistering with sclerotic lesion on the left side of T6 suspicious for biologic tumor activity.  She had CT guided biopsy on 4/17/24 which showed metastatic carcinoma consistent from breast primary. She saw Dr. Anaya and received palliative RT 2000cGy to T6 lesion and completed RT on 5/22/24. She has been on Xeloda and did not tolerate well. She complains diarrhea, weight loss and eye irritation.   10/10/24 Manoj is here for follow up visit. She has h/o bilateral invasive lobular carcinoma of the breast, ER/DC positive and HER2/deana negative, status post bilateral mastectomy, bilateral sentinel lymph node biopsy, and right axillary lymph node dissection in 1/2013, status post adjuvant chemotherapy and adjuvant chest wall radiotherapy, Completed 10-year of endocrine therapy with Letrozole in 7/2023. She had CT AP in 2/2024 for abdominal pain which incidentally showed a lesion in T6. She had MRI spine on 3/11/24 which showed a 2.7 cm lesion within the left aspect of the T6 vertebral body and 7 mm marrow replacing lesion within the T11 vertebral body.  PET/CT on 3/28/24 showed Intense pathologic FDG uptake (SUV 9.3) coregistering with sclerotic lesion on the left side of T6 suspicious for biologic tumor activity.  She had CT guided biopsy on 4/17/24 which showed metastatic carcinoma consistent from breast primary. She saw Dr. Anaya and received palliative RT 2000cGy to T6 lesion and completed RT on 5/22/24. She took Xeloda for 3 months and did not tolerate well. Xeloda was discontinued. She had a PET scan in 9.24 which showed interval resolution of uptake in T6 and New uptake in L1 and new pleural effussion. She completed XRT 5 fractions in 9.24.  She is here for follow up.  11/11/24 Manoj is here for follow up visit. She has h/o bilateral invasive lobular carcinoma of the breast, ER/DC positive and HER2/deana negative, status post bilateral mastectomy, bilateral sentinel lymph node biopsy, and right axillary lymph node dissection in 1/2013, status post adjuvant chemotherapy and adjuvant chest wall radiotherapy, Completed 10-year of endocrine therapy with Letrozole in 7/2023. She developed recurrent disease with a lesion in T6 in 2/2024. CT guided biopsy on 4/17/24 showed metastatic carcinoma consistent from breast primary. She saw Dr. Anaya and received palliative RT 2000cGy to T6 lesion and completed RT on 5/22/24. She took Xeloda for 3 months and did not tolerate well. Xeloda was discontinued. She had repeat PET/CT in 9.24 which showed interval resolution of uptake in T6 but new uptake in L1 and new pleural effusion. She completed XRT 5 fractions. She complains low back pain.   12/30/24 Manoj is here for follow up visit. Her spouse is with her. She has h/o bilateral invasive lobular carcinoma of the breast, ER/DC positive and HER2/deana negative, s/p bilateral mastectomy, bilateral sentinel lymph node biopsy, and right axillary lymph node dissection in 1/2013, s/p adjuvant chemotherapy and adjuvant chest wall radiotherapy, Completed 10-year of endocrine therapy with Letrozole in 7/2023. She developed recurrent disease with a lesion in T6 in 2/2024. CT guided biopsy on 4/17/24 showed metastatic carcinoma consistent from breast primary. She saw Dr. Anaya and received palliative RT 2000cGy to T6 lesion and completed RT on 5/22/24. She took Xeloda for 3 months and did not tolerate well. Xeloda was discontinued. She had repeat PET/CT in 9/16/24 which showed interval resolution of uptake in T6 but new uptake in L1 and new pleural effusion. MR Lumbar spine on 9/25/24 showed Infiltrative enhancing bone marrow signal abnormality within the T11 and L1 vertebral bodies most compatible with osseous metastatic disease. Mild compression deformity of L1, likely pathologic. Multilevel degenerative changes as described. She completed XRT 5 fractions on 10/1/24 -10/7/24. She has a history of fall on 9/2024. CT chest 12/12/24 showed no pleural effusion. She complains low back pain, taking Aleeve as needed with relief.  1/14/25: Manoj is here for follow up visit. Her spouse is with her. She has h/o bilateral invasive lobular carcinoma of the breast, ER/DC positive and HER2/deana negative, s/p bilateral mastectomy, bilateral sentinel lymph node biopsy, and right axillary lymph node dissection in 1/2013, s/p adjuvant chemotherapy and adjuvant chest wall radiotherapy, Completed 10-year of endocrine therapy with Letrozole in 7/2023. She developed recurrent disease with a lesion in T6 in 2/2024. CT guided biopsy on 4/17/24 showed metastatic carcinoma consistent from breast primary. She saw Dr. Anaya and received palliative RT 2000cGy to T6 lesion and completed RT on 5/22/24. She took Xeloda for 3 months and did not tolerate. Xeloda was discontinued. She had repeat PET/CT in 9/16/24 which showed interval resolution of uptake in T6 but new uptake in L1 and new pleural effusion.  She completed XRT 5 fractions on 10/1/24 -10/7/24. She has been taking Letrozole. She feels some aches and pains.   2/20/25 Manoj is here for follow up visit. Her spouse is with her. She has h/o bilateral invasive lobular carcinoma of the breast, ER/DC positive and HER2/deana negative, s/p bilateral mastectomy, bilateral sentinel lymph node biopsy, and right axillary lymph node dissection in 1/2013, s/p adjuvant chemotherapy and adjuvant chest wall radiotherapy, Completed 10-year of endocrine therapy with Letrozole in 7/2023. She developed recurrent disease with a lesion in T6 in 2/2024. CT guided biopsy on 4/17/24 showed metastatic carcinoma consistent from breast primary. She saw Dr. Anaya and received palliative RT 2000cGy to T6 lesion and completed RT on 5/22/24. She took Xeloda for 3 months and did not tolerate. Xeloda was discontinued. She had repeat PET/CT in 9/16/24 which showed interval resolution of uptake in T6 but new uptake in L1 and new pleural effusion. She completed XRT 5 fractions on 10/1/24 -10/7/24. She has been taking Letrozole. PET/CT on 1/7/25 showed several new foci of pathologic FDG uptake in bones, suspicious for biologic tumor activity. Interval resolution of previously noted small right pleural effusion. She feels some aches and pains. She started weekly Taxol, 2 weeks on and 1 week off on 1/31/25, s/p cycle #1 and is tolerating well except for diarrhea yesterday, managed with Imodium. She denies nausea, vomiting, or numbness or tingling of hands/feet. She saw Dr Taylor and is planned for L1 kyphoplasty on 3/18/25.  3/13/25 Manoj is here for follow up visit. Her spouse is with her. She has h/o bilateral invasive lobular carcinoma of the breast, ER/DC positive and HER2/deana negative, s/p bilateral mastectomy, bilateral sentinel lymph node biopsy, and right axillary lymph node dissection in 1/2013, s/p adjuvant chemotherapy and adjuvant chest wall radiotherapy, Completed 10-year of endocrine therapy with Letrozole in 7/2023. She developed recurrent disease with a lesion in T6 in 2/2024. CT guided biopsy on 4/17/24 showed metastatic carcinoma consistent from breast primary. She saw Dr. Anaya and received palliative RT 2000cGy to T6 lesion and completed RT on 5/22/24. She took Xeloda for 3 months and did not tolerate. Xeloda was discontinued. She had repeat PET/CT in 9/16/24 which showed interval resolution of uptake in T6 but new uptake in L1 and new pleural effusion. She completed XRT 5 fractions on 10/1/24 -10/7/24. She has been taking Letrozole. PET/CT on 1/7/25 showed several new foci of pathologic FDG uptake in bones, suspicious for biologic tumor activity. She started weekly Taxol, 2 weeks on and 1 week off on 1/31/25, s/p 2 cycles and tolerated.    4/3/25 Manoj is here for follow up visit. Her spouse is with her. She has h/o bilateral invasive lobular carcinoma of the breast, ER/DC positive and HER2/deana negative, s/p bilateral mastectomy, bilateral sentinel lymph node biopsy, and right axillary lymph node dissection in 1/2013, s/p adjuvant chemotherapy and adjuvant chest wall radiotherapy, Completed 10-year of endocrine therapy with Letrozole in 7/2023. She developed recurrent disease with a lesion in T6 in 2/2024. CT guided biopsy on 4/17/24 showed metastatic carcinoma consistent from breast primary. She saw Dr. Anaya and received palliative RT 2000cGy to T6 lesion and completed RT on 5/22/24. She took Xeloda for 3 months and did not tolerate. Xeloda was discontinued. She had repeat PET/CT in 9/16/24 which showed interval resolution of uptake in T6 but new uptake in L1 and new pleural effusion. She completed XRT 5 fractions on 10/1/24 -10/7/24. She was taking Letrozole. PET/CT on 1/7/25 showed several new foci of pathologic FDG uptake in bones, suspicious for biologic tumor activity. She started weekly Taxol, 2 weeks on and 1 week off on 1/31/25, s/p 3 cycles and tolerated. She feels some numbness and tingling in her hands and feet. She does not have n/v/d, mouth sore, chill or fever.    4/16/25 Manoj is here for follow up visit. Her spouse is with her. She has h/o bilateral invasive lobular carcinoma of the breast, ER/DC positive and HER2/deana negative, s/p bilateral mastectomy, bilateral sentinel lymph node biopsy, and right axillary lymph node dissection in 1/2013, s/p adjuvant chemotherapy and adjuvant chest wall radiotherapy, Completed 10-year of endocrine therapy with Letrozole in 7/2023. She developed recurrent disease with a lesion in T6 in 2/2024. CT guided biopsy on 4/17/24 showed metastatic carcinoma consistent from breast primary. She saw Dr. Anaya and received palliative RT 2000cGy to T6 lesion and completed RT on 5/22/24. She took Xeloda for 3 months and did not tolerate. Xeloda was discontinued. She had repeat PET/CT in 9/16/24 which showed interval resolution of uptake in T6 but new uptake in L1 and new pleural effusion. She completed XRT 5 fractions on 10/1/24 -10/7/24. She was taking Letrozole. PET/CT on 1/7/25 showed several new foci of pathologic FDG uptake in bones, suspicious for biologic tumor activity. She started weekly Taxol, 2 weeks on and 1 week off on 1/31/25, s/p 3 cycles and tolerated. She feels some numbness and tingling in her hands and feet. She does not have n/v/d, mouth sore, chill or fever.    4/30/25 Manoj is here for follow up visit and chemotherapy. Her  is with her. She has h/o bilateral invasive lobular carcinoma of the breast, ER/DC positive and HER2/deana negative, s/p bilateral mastectomy, bilateral sentinel lymph node biopsy, and right axillary lymph node dissection in 1/2013, s/p adjuvant chemotherapy and adjuvant chest wall radiotherapy, Completed 10-year of endocrine therapy with Letrozole in 7/2023. She developed recurrent disease with a lesion in T6 in 2/2024. CT guided biopsy on 4/17/24 showed metastatic carcinoma consistent from breast primary. She received palliative RT 2000cGy to T6 lesion and completed RT on 5/22/24. She took Xeloda for 3 months and did not tolerate. Xeloda was discontinued. She had repeat PET/CT in 9/16/24 which showed interval resolution of uptake in T6 but new uptake in L1 and new pleural effusion. She completed XRT 5 fractions on 10/1/24 -10/7/24. She was taking Letrozole. PET/CT on 1/7/25 showed several new foci of pathologic FDG uptake in bones, suspicious for biologic tumor activity. She started weekly Taxol, 2 weeks on and 1 week off on 1/31/25, s/p 4 cycles and tolerated. She has mild neuropathy symptoms. She does not have n/v/d, mouth sore, chill or fever.    5/21/25 Manoj is here for follow up visit and chemotherapy. Her  is with her. She has h/o bilateral invasive lobular carcinoma of the breast, ER/DC positive and HER2/deana negative, s/p bilateral mastectomy, bilateral sentinel lymph node biopsy, and right axillary lymph node dissection in 1/2013, s/p adjuvant chemotherapy and adjuvant chest wall radiotherapy, Completed 10-year of endocrine therapy with Letrozole in 7/2023. She developed recurrent disease with a lesion in T6 in 2/2024. CT guided biopsy on 4/17/24 showed metastatic carcinoma consistent from breast primary, triple negative. She received palliative RT 2000cGy to T6 lesion and completed RT on 5/22/24. She took Xeloda for 3 months and did not tolerate. Xeloda was discontinued. She had repeat PET/CT in 9/16/24 which showed interval resolution of uptake in T6 but new uptake in L1 and new pleural effusion. She completed XRT 5 fractions on 10/1/24 -10/7/24. She was taking Letrozole. PET/CT on 1/7/25 showed several new foci of pathologic FDG uptake in bones, suspicious for biologic tumor activity. She started weekly Taxol, 2 weeks on and 1 week off on 1/31/25, s/p 5 cycles and is tolerating. She feels neuropathy which is getting worse on the right foot. She had explosive diarrhea last Sunday which resolved after taking Imodium. She gets intermittent back pain, managed with Aleeve. She denies nausea, vomiting, fever or chills.

## 2025-05-27 NOTE — HISTORY OF PRESENT ILLNESS
[de-identified] : This 68-year-old female is here today for a followup visit.  She has a history of bilateral invasive lobular carcinoma, stage IIIA (pT1c N2a M0) in the right breast, and stage IA (pT1b N0 M0) in the left breast.  Both sided breast cancer were ER/VT positive and HER2/deana negative.  She had bilateral mastectomy, bilateral sentinel lymph node biopsy, and right axillary lymph node dissection at [facility] on 01/08/2013.  She received adjuvant chemotherapy with Adriamycin and Cytoxan followed by paclitaxel.  She also received post mastectomy adjuvant radiotherapy to right side chest wall.  She has been on adjuvant endocrine therapy with Femara since 7/2013  and has been tolerating well.  She had genetic test for BRCA mutations.  She is negative for BRCA1/2 mutations.  In 10/2014, she had a bone density at [facility], which showed osteopenia. Her latest bone density was done 7/21/16 which showed osteopenia. MRI of breasts was done on 1/30/17 which showed no MR evidence of malignancy in either breast. Recommendation: Unless otherwise indicated by clinical findings, annual screening mammography recommended. This can be alternated at 6 month intervals with bilateral screening breast MRI.\par   She has been taking calcium and vitamin D supplements. Latest colonoscopy 11/2016 pt states it was negative. Saw Dr. Garcia 1/2017.\par  She saw Dr. Rankin recently. She is scheduled to have a revision of her lower abdominal fat necrosis on 1/2018.\par  \par   [de-identified] : 6/8/18: She stopped letrozole few months ago as she was found to be in Afib by her cardiologist on Pre-op clearance for abdominal fat necrosis. However her cardiologist () told her that you can resume Letrozole. She had surgery for abdominal fat necrosis and she is going for surgery for incisional hernia by .   12/13/18: The patient is here for followup visit. She has been taking Letrozole daily (since 7/2013) and tolerating well. She had hernia repair surgery. She complains some pain in her knees. She had MRI breast in 1 2017. There was no suspicious finding.  6/14/19; The patient is here for followup visit. She has been taking Letrozole daily (since 7/2013) and tolerating well. She complains some pain in her knees. She does not have other new complains. She has mild elevated calcium. PHT was normal.  12/13/2019 :  The patient is here for follow up. She has been on letrozole daily and tolerating well. She had a bone density 12/2/109 which showed osteopenia of femur and hip. She is not taking calcium and vit D since her last calcium was elevated 10.7. She had a recent US breasts 12/2019 for breast pain which was negative. She was seen by her cardiologist and had blood work in the summer. She was told that she has severe anemia. She started her on iron pills. She denies any vaginal bleeding or rectal bleeding  6/12/2020: The patient is here for follow up. She had bilateral invasive lobular carcinoma of the breast, ER/TN positive and HER2/deana negative, status post bilateral mastectomy, bilateral sentinel lymph node biopsy, and right axillary lymph node dissection in 1/2013, status post adjuvant chemotherapy and adjuvant chest wall radiotherapy, currently on adjuvant endocrine therapy with letrozole and tolerating well. She had a bone density 12/2/19 which showed osteopenia of femur and hip. She is not taking calcium and vit D since her last calcium was elevated 10.7. She had a recent US breasts 12/2019 for breast pain which was negative. She does not have new complains.  12/02/2020: MANOJ is here for follow up visit for bilateral invasive lobular carcinoma of the breast, ER/TN positive and HER2/deana negative, status post bilateral mastectomy, bilateral sentinel lymph node biopsy, and right axillary lymph node dissection in 1/2013, status post adjuvant chemotherapy and adjuvant chest wall radiotherapy, currently on adjuvant endocrine therapy with letrozole and tolerating well besides mild arthralgia. She had a bone density 12/2/19 which showed osteopenia of femur and hip. She continues on Vitamin D daily. She denies any new breast symptoms at this time. In addition, patient was diagnosed with COVID in 3/2020.   06/02/2021: MANOJ is here for follow up visit for bilateral invasive lobular carcinoma of the breast, ER/TN positive and HER2/deana negative, status post bilateral mastectomy, bilateral sentinel lymph node biopsy, and right axillary lymph node dissection in 1/2013, status post adjuvant chemotherapy and adjuvant chest wall radiotherapy, currently on adjuvant endocrine therapy with letrozole since 7/2013 and tolerating well besides mild arthralgia. She had a bone density 12/2/19 which showed osteopenia of femur and hip. She continues on Vitamin D daily. She denies any new breast symptoms at this time. she had right breast skin tag biopsied by dermatologist; which was benign.   1/5/22 MANOJ is here for follow up visit for bilateral invasive lobular carcinoma of the breast, ER/TN positive and HER2/deana negative, status post bilateral mastectomy, bilateral sentinel lymph node biopsy, and right axillary lymph node dissection in 1/2013, status post adjuvant chemotherapy and adjuvant chest wall radiotherapy, currently on adjuvant endocrine therapy with letrozole since 7/2013 and tolerating well besides mild arthralgia. She had a bone density 12/7/21 which showed worsening osteopenia of femur and hip, T score -2.4. She is not complaint with Vitamin D, does not take calcium due to mildly elevated Calcium. She denies any new breast symptoms at this time. She was experiencing lower abdominal and pelvic pain in Sept had US which was unremarkable. In addition, had Thyroid US due to hypercalcemia which was unremarkable, In 8/2021 TSH 2.36, Calcium 10, and PTH was 78.   7/21/22: Manoj is here for follow up visit for bilateral invasive lobular carcinoma of the breast, ER/TN positive and HER2/deana negative, status post bilateral mastectomy, bilateral sentinel lymph node biopsy, and right axillary lymph node dissection in 1/2013, status post adjuvant chemotherapy and adjuvant chest wall radiotherapy, currently on adjuvant endocrine therapy with letrozole since 7/2013 and tolerating well besides mild arthralgia. She had a bone density 12/7/21 which showed worsening osteopenia of femur and hip, T score -2.4. She takes Vitamin D but not calcium because her calcium has been mildly elevated. She was found to have large hiatal hernia and she has had on and off diarrhea for long time. She has been seen by Dr. Borjas.   01/26/2023 Manoj is here for follow up visit for bilateral invasive lobular carcinoma of the breast, ER/TN positive and HER2/deana negative, status post bilateral mastectomy, bilateral sentinel lymph node biopsy, and right axillary lymph node dissection in 1/2013, status post adjuvant chemotherapy and adjuvant chest wall radiotherapy, currently on adjuvant endocrine therapy with letrozole since 7/2013 and tolerating well besides mild arthralgia. She had a bone density 12/7/21 which showed worsening osteopenia of femur and hip, T score -2.4. She takes Vitamin D but not calcium because her calcium has been mildly elevated. She was found to have large hiatal hernia and surgery on 11/21/22. Her Hb drooped to 9.5 g/dl after surgery. She was found to have high serum calcium but repeat calcium was normal. PTH was also within normal limit.  7/20/23 Manoj is here for follow up visit for bilateral invasive lobular carcinoma of the breast, ER/TN positive and HER2/deana negative, status post bilateral mastectomy, bilateral sentinel lymph node biopsy, and right axillary lymph node dissection in 1/2013, status post adjuvant chemotherapy and adjuvant chest wall radiotherapy, currently on adjuvant endocrine therapy with letrozole since 7/2013 and tolerating well besides mild arthralgia. She had a bone density 12/7/21 which showed worsening osteopenia of femur and hip, T score -2.4. She takes Vitamin D but not calcium because her calcium has been mildly elevated. Repeat serum calcium normal, MM panel normal. She was found to have large hiatal hernia and surgery on 11/21/22. Her Hb drooped to 9.5 g/dl after surgery, now normal. After hernia repair she was having symptoms consistent with vagal nerve mediated gastroparesis, she then had endoscopic pyloromyotomy, symptoms resolved.   1/25/2024: Manoj is here for follow up visit for bilateral invasive lobular carcinoma of the breast, ER/TN positive and HER2/deana negative, status post bilateral mastectomy, bilateral sentinel lymph node biopsy, and right axillary lymph node dissection in 1/2013, status post adjuvant chemotherapy and adjuvant chest wall radiotherapy, currently on adjuvant endocrine therapy with letrozole since 7/2013 and tolerating well besides mild arthralgia. She Completed 10 years treatment of Letrozole in 7/2023. She had a bone density 12/8/23 Showed:         ----------------------------------------------------------------- AP Spine (L1-L4)         0.998   -0.4      1.8     Normal Femoral Neck (Left) 0.552   -2.7     -0.7     Osteoporosis Total Hip (Left) 0.676   -2.2     -0.6     Osteopenia -which showed Osteoporosis of femur Neck and Osteopenia in the Hip. She takes Vitamin D.  After hernia repair, she was having symptoms consistent with vagal nerve mediated gastroparesis, she then had endoscopic pyloromyotomy, symptoms resolved.  She starts feeling better now.   3/29/24: Manoj is here for follow up visit. She has h/o bilateral invasive lobular carcinoma of the breast, ER/TN positive and HER2/deana negative, status post bilateral mastectomy, bilateral sentinel lymph node biopsy, and right axillary lymph node dissection in 1/2013, status post adjuvant chemotherapy and adjuvant chest wall radiotherapy, currently on adjuvant endocrine therapy with letrozole since 7/2013. She had CT AP in 2/2024 for abdominal pain which incidentally showed a lesion in T6. She had MRI spine on 3/11/24 which showed a 2.7 cm lesion within the left aspect of the T6 vertebral body and 7 mm marrow replacing lesion within the T11 vertebral body.  PET/CT on 3/28/24 showed Intense pathologic FDG uptake (SUV 9.3) coregistering with sclerotic lesion on the left side of T6 suspicious for biologic tumor activity. No other sites of abnormal FDG uptake. She is here today to discuss further management plan.  4/29/24: Manoj is here for follow up visit. She has h/o bilateral invasive lobular carcinoma of the breast, ER/TN positive and HER2/deana negative, status post bilateral mastectomy, bilateral sentinel lymph node biopsy, and right axillary lymph node dissection in 1/2013, status post adjuvant chemotherapy and adjuvant chest wall radiotherapy, Completed 10-year of endocrine therapy with Letrozole in 7/2023. She had CT AP in 2/2024 for abdominal pain which incidentally showed a lesion in T6. She had MRI spine on 3/11/24 which showed a 2.7 cm lesion within the left aspect of the T6 vertebral body and 7 mm marrow replacing lesion within the T11 vertebral body.  PET/CT on 3/28/24 showed Intense pathologic FDG uptake (SUV 9.3) coregistering with sclerotic lesion on the left side of T6 suspicious for biologic tumor activity. No other sites of abnormal FDG uptake. She had CT guided biopsy on 4/17/24 and is here today to discuss the result.     5/30/24: Manoj is here for follow up visit. She has h/o bilateral invasive lobular carcinoma of the breast, ER/TN positive and HER2/deana negative, status post bilateral mastectomy, bilateral sentinel lymph node biopsy, and right axillary lymph node dissection in 1/2013, status post adjuvant chemotherapy and adjuvant chest wall radiotherapy, Completed 10-year of endocrine therapy with Letrozole in 7/2023. She had CT AP in 2/2024 for abdominal pain which incidentally showed a lesion in T6. She had MRI spine on 3/11/24 which showed a 2.7 cm lesion within the left aspect of the T6 vertebral body and 7 mm marrow replacing lesion within the T11 vertebral body.  PET/CT on 3/28/24 showed Intense pathologic FDG uptake (SUV 9.3) coregistering with sclerotic lesion on the left side of T6 suspicious for biologic tumor activity.  She had CT guided biopsy on 4/17/24 which showed metastatic carcinoma consistent from breast primary. She saw Dr. Anaya and received palliative RT 2000cGy to T6 lesion and completed RT on 5/22/24.  6/27/24: Manoj is here for follow up visit. She has h/o bilateral invasive lobular carcinoma of the breast, ER/TN positive and HER2/deana negative, status post bilateral mastectomy, bilateral sentinel lymph node biopsy, and right axillary lymph node dissection in 1/2013, status post adjuvant chemotherapy and adjuvant chest wall radiotherapy, Completed 10-year of endocrine therapy with Letrozole in 7/2023. She had CT AP in 2/2024 for abdominal pain which incidentally showed a lesion in T6. She had MRI spine on 3/11/24 which showed a 2.7 cm lesion within the left aspect of the T6 vertebral body and 7 mm marrow replacing lesion within the T11 vertebral body.  PET/CT on 3/28/24 showed Intense pathologic FDG uptake (SUV 9.3) coregistering with sclerotic lesion on the left side of T6 suspicious for biologic tumor activity.  She had CT guided biopsy on 4/17/24 which showed metastatic carcinoma consistent from breast primary. She saw Dr. Anaya and received palliative RT 2000cGy to T6 lesion and completed RT on 5/22/24. She started on Xeloda and had 1st week treatment. She had loose stool but otherwise tolerated well.   08/01/24: bilateral sentinel lymph node biopsy, and right axillary lymph node dissection in 1/2013, status post adjuvant chemotherapy and adjuvant chest wall radiotherapy, Completed 10-year of endocrine therapy with Letrozole in 7/2023. She had CT AP in 2/2024 for abdominal pain which incidentally showed a lesion in T6. She had MRI spine on 3/11/24 which showed a 2.7 cm lesion within the left aspect of the T6 vertebral body and 7 mm marrow replacing lesion within the T11 vertebral body.  PET/CT on 3/28/24 showed Intense pathologic FDG uptake (SUV 9.3) coregistering with sclerotic lesion on the left side of T6 suspicious for biologic tumor activity.  She had CT guided biopsy on 4/17/24 which showed metastatic carcinoma consistent from breast primary. She saw Dr. Anaya and received palliative RT 2000cGy to T6 lesion and completed RT on 5/22/24. She started on Xeloda and had 1st week of June 2024. She is complaining of persistence diarrhea despite Imodium intake, currently on Xeloda 3tabs every 12 hrs for one week on and one week off.   8/29/24: Manoj is here for follow up visit. She has h/o bilateral invasive lobular carcinoma of the breast, ER/TN positive and HER2/deana negative, status post bilateral mastectomy, bilateral sentinel lymph node biopsy, and right axillary lymph node dissection in 1/2013, status post adjuvant chemotherapy and adjuvant chest wall radiotherapy, Completed 10-year of endocrine therapy with Letrozole in 7/2023. She had CT AP in 2/2024 for abdominal pain which incidentally showed a lesion in T6. She had MRI spine on 3/11/24 which showed a 2.7 cm lesion within the left aspect of the T6 vertebral body and 7 mm marrow replacing lesion within the T11 vertebral body.  PET/CT on 3/28/24 showed Intense pathologic FDG uptake (SUV 9.3) coregistering with sclerotic lesion on the left side of T6 suspicious for biologic tumor activity.  She had CT guided biopsy on 4/17/24 which showed metastatic carcinoma consistent from breast primary. She saw Dr. Anaya and received palliative RT 2000cGy to T6 lesion and completed RT on 5/22/24. She has been on Xeloda and did not tolerate well. She complains diarrhea, weight loss and eye irritation.   10/10/24 Manoj is here for follow up visit. She has h/o bilateral invasive lobular carcinoma of the breast, ER/TN positive and HER2/deana negative, status post bilateral mastectomy, bilateral sentinel lymph node biopsy, and right axillary lymph node dissection in 1/2013, status post adjuvant chemotherapy and adjuvant chest wall radiotherapy, Completed 10-year of endocrine therapy with Letrozole in 7/2023. She had CT AP in 2/2024 for abdominal pain which incidentally showed a lesion in T6. She had MRI spine on 3/11/24 which showed a 2.7 cm lesion within the left aspect of the T6 vertebral body and 7 mm marrow replacing lesion within the T11 vertebral body.  PET/CT on 3/28/24 showed Intense pathologic FDG uptake (SUV 9.3) coregistering with sclerotic lesion on the left side of T6 suspicious for biologic tumor activity.  She had CT guided biopsy on 4/17/24 which showed metastatic carcinoma consistent from breast primary. She saw Dr. Anaya and received palliative RT 2000cGy to T6 lesion and completed RT on 5/22/24. She took Xeloda for 3 months and did not tolerate well. Xeloda was discontinued. She had a PET scan in 9.24 which showed interval resolution of uptake in T6 and New uptake in L1 and new pleural effussion. She completed XRT 5 fractions in 9.24.  She is here for follow up.  11/11/24 Manoj is here for follow up visit. She has h/o bilateral invasive lobular carcinoma of the breast, ER/TN positive and HER2/deana negative, status post bilateral mastectomy, bilateral sentinel lymph node biopsy, and right axillary lymph node dissection in 1/2013, status post adjuvant chemotherapy and adjuvant chest wall radiotherapy, Completed 10-year of endocrine therapy with Letrozole in 7/2023. She developed recurrent disease with a lesion in T6 in 2/2024. CT guided biopsy on 4/17/24 showed metastatic carcinoma consistent from breast primary. She saw Dr. Anaya and received palliative RT 2000cGy to T6 lesion and completed RT on 5/22/24. She took Xeloda for 3 months and did not tolerate well. Xeloda was discontinued. She had repeat PET/CT in 9.24 which showed interval resolution of uptake in T6 but new uptake in L1 and new pleural effusion. She completed XRT 5 fractions. She complains low back pain.   12/30/24 Manoj is here for follow up visit. Her spouse is with her. She has h/o bilateral invasive lobular carcinoma of the breast, ER/TN positive and HER2/deana negative, s/p bilateral mastectomy, bilateral sentinel lymph node biopsy, and right axillary lymph node dissection in 1/2013, s/p adjuvant chemotherapy and adjuvant chest wall radiotherapy, Completed 10-year of endocrine therapy with Letrozole in 7/2023. She developed recurrent disease with a lesion in T6 in 2/2024. CT guided biopsy on 4/17/24 showed metastatic carcinoma consistent from breast primary. She saw Dr. Anaya and received palliative RT 2000cGy to T6 lesion and completed RT on 5/22/24. She took Xeloda for 3 months and did not tolerate well. Xeloda was discontinued. She had repeat PET/CT in 9/16/24 which showed interval resolution of uptake in T6 but new uptake in L1 and new pleural effusion. MR Lumbar spine on 9/25/24 showed Infiltrative enhancing bone marrow signal abnormality within the T11 and L1 vertebral bodies most compatible with osseous metastatic disease. Mild compression deformity of L1, likely pathologic. Multilevel degenerative changes as described. She completed XRT 5 fractions on 10/1/24 -10/7/24. She has a history of fall on 9/2024. CT chest 12/12/24 showed no pleural effusion. She complains low back pain, taking Aleeve as needed with relief.  1/14/25: Manoj is here for follow up visit. Her spouse is with her. She has h/o bilateral invasive lobular carcinoma of the breast, ER/TN positive and HER2/deana negative, s/p bilateral mastectomy, bilateral sentinel lymph node biopsy, and right axillary lymph node dissection in 1/2013, s/p adjuvant chemotherapy and adjuvant chest wall radiotherapy, Completed 10-year of endocrine therapy with Letrozole in 7/2023. She developed recurrent disease with a lesion in T6 in 2/2024. CT guided biopsy on 4/17/24 showed metastatic carcinoma consistent from breast primary. She saw Dr. Anaya and received palliative RT 2000cGy to T6 lesion and completed RT on 5/22/24. She took Xeloda for 3 months and did not tolerate. Xeloda was discontinued. She had repeat PET/CT in 9/16/24 which showed interval resolution of uptake in T6 but new uptake in L1 and new pleural effusion.  She completed XRT 5 fractions on 10/1/24 -10/7/24. She has been taking Letrozole. She feels some aches and pains.   2/20/25 Manoj is here for follow up visit. Her spouse is with her. She has h/o bilateral invasive lobular carcinoma of the breast, ER/TN positive and HER2/deana negative, s/p bilateral mastectomy, bilateral sentinel lymph node biopsy, and right axillary lymph node dissection in 1/2013, s/p adjuvant chemotherapy and adjuvant chest wall radiotherapy, Completed 10-year of endocrine therapy with Letrozole in 7/2023. She developed recurrent disease with a lesion in T6 in 2/2024. CT guided biopsy on 4/17/24 showed metastatic carcinoma consistent from breast primary. She saw Dr. Anaya and received palliative RT 2000cGy to T6 lesion and completed RT on 5/22/24. She took Xeloda for 3 months and did not tolerate. Xeloda was discontinued. She had repeat PET/CT in 9/16/24 which showed interval resolution of uptake in T6 but new uptake in L1 and new pleural effusion. She completed XRT 5 fractions on 10/1/24 -10/7/24. She has been taking Letrozole. PET/CT on 1/7/25 showed several new foci of pathologic FDG uptake in bones, suspicious for biologic tumor activity. Interval resolution of previously noted small right pleural effusion. She feels some aches and pains. She started weekly Taxol, 2 weeks on and 1 week off on 1/31/25, s/p cycle #1 and is tolerating well except for diarrhea yesterday, managed with Imodium. She denies nausea, vomiting, or numbness or tingling of hands/feet. She saw Dr Taylor and is planned for L1 kyphoplasty on 3/18/25.  3/13/25 Manoj is here for follow up visit. Her spouse is with her. She has h/o bilateral invasive lobular carcinoma of the breast, ER/TN positive and HER2/deana negative, s/p bilateral mastectomy, bilateral sentinel lymph node biopsy, and right axillary lymph node dissection in 1/2013, s/p adjuvant chemotherapy and adjuvant chest wall radiotherapy, Completed 10-year of endocrine therapy with Letrozole in 7/2023. She developed recurrent disease with a lesion in T6 in 2/2024. CT guided biopsy on 4/17/24 showed metastatic carcinoma consistent from breast primary. She saw Dr. Anaya and received palliative RT 2000cGy to T6 lesion and completed RT on 5/22/24. She took Xeloda for 3 months and did not tolerate. Xeloda was discontinued. She had repeat PET/CT in 9/16/24 which showed interval resolution of uptake in T6 but new uptake in L1 and new pleural effusion. She completed XRT 5 fractions on 10/1/24 -10/7/24. She has been taking Letrozole. PET/CT on 1/7/25 showed several new foci of pathologic FDG uptake in bones, suspicious for biologic tumor activity. She started weekly Taxol, 2 weeks on and 1 week off on 1/31/25, s/p 2 cycles and tolerated.    4/3/25 Manoj is here for follow up visit. Her spouse is with her. She has h/o bilateral invasive lobular carcinoma of the breast, ER/TN positive and HER2/deana negative, s/p bilateral mastectomy, bilateral sentinel lymph node biopsy, and right axillary lymph node dissection in 1/2013, s/p adjuvant chemotherapy and adjuvant chest wall radiotherapy, Completed 10-year of endocrine therapy with Letrozole in 7/2023. She developed recurrent disease with a lesion in T6 in 2/2024. CT guided biopsy on 4/17/24 showed metastatic carcinoma consistent from breast primary. She saw Dr. Anaya and received palliative RT 2000cGy to T6 lesion and completed RT on 5/22/24. She took Xeloda for 3 months and did not tolerate. Xeloda was discontinued. She had repeat PET/CT in 9/16/24 which showed interval resolution of uptake in T6 but new uptake in L1 and new pleural effusion. She completed XRT 5 fractions on 10/1/24 -10/7/24. She was taking Letrozole. PET/CT on 1/7/25 showed several new foci of pathologic FDG uptake in bones, suspicious for biologic tumor activity. She started weekly Taxol, 2 weeks on and 1 week off on 1/31/25, s/p 3 cycles and tolerated. She feels some numbness and tingling in her hands and feet. She does not have n/v/d, mouth sore, chill or fever.    4/16/25 Manoj is here for follow up visit. Her spouse is with her. She has h/o bilateral invasive lobular carcinoma of the breast, ER/TN positive and HER2/deana negative, s/p bilateral mastectomy, bilateral sentinel lymph node biopsy, and right axillary lymph node dissection in 1/2013, s/p adjuvant chemotherapy and adjuvant chest wall radiotherapy, Completed 10-year of endocrine therapy with Letrozole in 7/2023. She developed recurrent disease with a lesion in T6 in 2/2024. CT guided biopsy on 4/17/24 showed metastatic carcinoma consistent from breast primary. She saw Dr. Anaya and received palliative RT 2000cGy to T6 lesion and completed RT on 5/22/24. She took Xeloda for 3 months and did not tolerate. Xeloda was discontinued. She had repeat PET/CT in 9/16/24 which showed interval resolution of uptake in T6 but new uptake in L1 and new pleural effusion. She completed XRT 5 fractions on 10/1/24 -10/7/24. She was taking Letrozole. PET/CT on 1/7/25 showed several new foci of pathologic FDG uptake in bones, suspicious for biologic tumor activity. She started weekly Taxol, 2 weeks on and 1 week off on 1/31/25, s/p 3 cycles and tolerated. She feels some numbness and tingling in her hands and feet. She does not have n/v/d, mouth sore, chill or fever.    4/30/25 Manoj is here for follow up visit and chemotherapy. Her  is with her. She has h/o bilateral invasive lobular carcinoma of the breast, ER/TN positive and HER2/deana negative, s/p bilateral mastectomy, bilateral sentinel lymph node biopsy, and right axillary lymph node dissection in 1/2013, s/p adjuvant chemotherapy and adjuvant chest wall radiotherapy, Completed 10-year of endocrine therapy with Letrozole in 7/2023. She developed recurrent disease with a lesion in T6 in 2/2024. CT guided biopsy on 4/17/24 showed metastatic carcinoma consistent from breast primary. She received palliative RT 2000cGy to T6 lesion and completed RT on 5/22/24. She took Xeloda for 3 months and did not tolerate. Xeloda was discontinued. She had repeat PET/CT in 9/16/24 which showed interval resolution of uptake in T6 but new uptake in L1 and new pleural effusion. She completed XRT 5 fractions on 10/1/24 -10/7/24. She was taking Letrozole. PET/CT on 1/7/25 showed several new foci of pathologic FDG uptake in bones, suspicious for biologic tumor activity. She started weekly Taxol, 2 weeks on and 1 week off on 1/31/25, s/p 4 cycles and tolerated. She has mild neuropathy symptoms. She does not have n/v/d, mouth sore, chill or fever.    5/21/25 Manoj is here for follow up visit and chemotherapy. Her  is with her. She has h/o bilateral invasive lobular carcinoma of the breast, ER/TN positive and HER2/deana negative, s/p bilateral mastectomy, bilateral sentinel lymph node biopsy, and right axillary lymph node dissection in 1/2013, s/p adjuvant chemotherapy and adjuvant chest wall radiotherapy, Completed 10-year of endocrine therapy with Letrozole in 7/2023. She developed recurrent disease with a lesion in T6 in 2/2024. CT guided biopsy on 4/17/24 showed metastatic carcinoma consistent from breast primary, triple negative. She received palliative RT 2000cGy to T6 lesion and completed RT on 5/22/24. She took Xeloda for 3 months and did not tolerate. Xeloda was discontinued. She had repeat PET/CT in 9/16/24 which showed interval resolution of uptake in T6 but new uptake in L1 and new pleural effusion. She completed XRT 5 fractions on 10/1/24 -10/7/24. She was taking Letrozole. PET/CT on 1/7/25 showed several new foci of pathologic FDG uptake in bones, suspicious for biologic tumor activity. She started weekly Taxol, 2 weeks on and 1 week off on 1/31/25, s/p 5 cycles and is tolerating. She feels neuropathy which is getting worse on the right foot. She had explosive diarrhea last Sunday which resolved after taking Imodium. She gets intermittent back pain, managed with Aleeve. She denies nausea, vomiting, fever or chills.

## 2025-05-27 NOTE — PHYSICAL EXAM
[Fully active, able to carry on all pre-disease performance without restriction] : Status 0 - Fully active, able to carry on all pre-disease performance without restriction [Normal] : affect appropriate [de-identified] : Right chestchemoport, intact and no erythema noted. [de-identified] :  Status post bilateral mastectomy and autologous tissue reconstruction. There is no skin lesion and no palpable axillary lymphadenopathy. [de-identified] : Lower abdominal fat necrosis. Abdominal scar present.  [de-identified] : Low back pain 2' metastatic disease.

## 2025-05-27 NOTE — HISTORY OF PRESENT ILLNESS
[de-identified] : This 68-year-old female is here today for a followup visit.  She has a history of bilateral invasive lobular carcinoma, stage IIIA (pT1c N2a M0) in the right breast, and stage IA (pT1b N0 M0) in the left breast.  Both sided breast cancer were ER/PA positive and HER2/deana negative.  She had bilateral mastectomy, bilateral sentinel lymph node biopsy, and right axillary lymph node dissection at [facility] on 01/08/2013.  She received adjuvant chemotherapy with Adriamycin and Cytoxan followed by paclitaxel.  She also received post mastectomy adjuvant radiotherapy to right side chest wall.  She has been on adjuvant endocrine therapy with Femara since 7/2013  and has been tolerating well.  She had genetic test for BRCA mutations.  She is negative for BRCA1/2 mutations.  In 10/2014, she had a bone density at [facility], which showed osteopenia. Her latest bone density was done 7/21/16 which showed osteopenia. MRI of breasts was done on 1/30/17 which showed no MR evidence of malignancy in either breast. Recommendation: Unless otherwise indicated by clinical findings, annual screening mammography recommended. This can be alternated at 6 month intervals with bilateral screening breast MRI.\par   She has been taking calcium and vitamin D supplements. Latest colonoscopy 11/2016 pt states it was negative. Saw Dr. Garcia 1/2017.\par  She saw Dr. Rankin recently. She is scheduled to have a revision of her lower abdominal fat necrosis on 1/2018.\par  \par   [de-identified] : 6/8/18: She stopped letrozole few months ago as she was found to be in Afib by her cardiologist on Pre-op clearance for abdominal fat necrosis. However her cardiologist () told her that you can resume Letrozole. She had surgery for abdominal fat necrosis and she is going for surgery for incisional hernia by .   12/13/18: The patient is here for followup visit. She has been taking Letrozole daily (since 7/2013) and tolerating well. She had hernia repair surgery. She complains some pain in her knees. She had MRI breast in 1 2017. There was no suspicious finding.  6/14/19; The patient is here for followup visit. She has been taking Letrozole daily (since 7/2013) and tolerating well. She complains some pain in her knees. She does not have other new complains. She has mild elevated calcium. PHT was normal.  12/13/2019 :  The patient is here for follow up. She has been on letrozole daily and tolerating well. She had a bone density 12/2/109 which showed osteopenia of femur and hip. She is not taking calcium and vit D since her last calcium was elevated 10.7. She had a recent US breasts 12/2019 for breast pain which was negative. She was seen by her cardiologist and had blood work in the summer. She was told that she has severe anemia. She started her on iron pills. She denies any vaginal bleeding or rectal bleeding  6/12/2020: The patient is here for follow up. She had bilateral invasive lobular carcinoma of the breast, ER/CT positive and HER2/denaa negative, status post bilateral mastectomy, bilateral sentinel lymph node biopsy, and right axillary lymph node dissection in 1/2013, status post adjuvant chemotherapy and adjuvant chest wall radiotherapy, currently on adjuvant endocrine therapy with letrozole and tolerating well. She had a bone density 12/2/19 which showed osteopenia of femur and hip. She is not taking calcium and vit D since her last calcium was elevated 10.7. She had a recent US breasts 12/2019 for breast pain which was negative. She does not have new complains.  12/02/2020: MANOJ is here for follow up visit for bilateral invasive lobular carcinoma of the breast, ER/CT positive and HER2/deana negative, status post bilateral mastectomy, bilateral sentinel lymph node biopsy, and right axillary lymph node dissection in 1/2013, status post adjuvant chemotherapy and adjuvant chest wall radiotherapy, currently on adjuvant endocrine therapy with letrozole and tolerating well besides mild arthralgia. She had a bone density 12/2/19 which showed osteopenia of femur and hip. She continues on Vitamin D daily. She denies any new breast symptoms at this time. In addition, patient was diagnosed with COVID in 3/2020.   06/02/2021: MANOJ is here for follow up visit for bilateral invasive lobular carcinoma of the breast, ER/CT positive and HER2/deana negative, status post bilateral mastectomy, bilateral sentinel lymph node biopsy, and right axillary lymph node dissection in 1/2013, status post adjuvant chemotherapy and adjuvant chest wall radiotherapy, currently on adjuvant endocrine therapy with letrozole since 7/2013 and tolerating well besides mild arthralgia. She had a bone density 12/2/19 which showed osteopenia of femur and hip. She continues on Vitamin D daily. She denies any new breast symptoms at this time. she had right breast skin tag biopsied by dermatologist; which was benign.   1/5/22 MANOJ is here for follow up visit for bilateral invasive lobular carcinoma of the breast, ER/CT positive and HER2/deana negative, status post bilateral mastectomy, bilateral sentinel lymph node biopsy, and right axillary lymph node dissection in 1/2013, status post adjuvant chemotherapy and adjuvant chest wall radiotherapy, currently on adjuvant endocrine therapy with letrozole since 7/2013 and tolerating well besides mild arthralgia. She had a bone density 12/7/21 which showed worsening osteopenia of femur and hip, T score -2.4. She is not complaint with Vitamin D, does not take calcium due to mildly elevated Calcium. She denies any new breast symptoms at this time. She was experiencing lower abdominal and pelvic pain in Sept had US which was unremarkable. In addition, had Thyroid US due to hypercalcemia which was unremarkable, In 8/2021 TSH 2.36, Calcium 10, and PTH was 78.   7/21/22: Manoj is here for follow up visit for bilateral invasive lobular carcinoma of the breast, ER/CT positive and HER2/deana negative, status post bilateral mastectomy, bilateral sentinel lymph node biopsy, and right axillary lymph node dissection in 1/2013, status post adjuvant chemotherapy and adjuvant chest wall radiotherapy, currently on adjuvant endocrine therapy with letrozole since 7/2013 and tolerating well besides mild arthralgia. She had a bone density 12/7/21 which showed worsening osteopenia of femur and hip, T score -2.4. She takes Vitamin D but not calcium because her calcium has been mildly elevated. She was found to have large hiatal hernia and she has had on and off diarrhea for long time. She has been seen by Dr. Borjas.   01/26/2023 Manoj is here for follow up visit for bilateral invasive lobular carcinoma of the breast, ER/CT positive and HER2/deana negative, status post bilateral mastectomy, bilateral sentinel lymph node biopsy, and right axillary lymph node dissection in 1/2013, status post adjuvant chemotherapy and adjuvant chest wall radiotherapy, currently on adjuvant endocrine therapy with letrozole since 7/2013 and tolerating well besides mild arthralgia. She had a bone density 12/7/21 which showed worsening osteopenia of femur and hip, T score -2.4. She takes Vitamin D but not calcium because her calcium has been mildly elevated. She was found to have large hiatal hernia and surgery on 11/21/22. Her Hb drooped to 9.5 g/dl after surgery. She was found to have high serum calcium but repeat calcium was normal. PTH was also within normal limit.  7/20/23 Manoj is here for follow up visit for bilateral invasive lobular carcinoma of the breast, ER/CT positive and HER2/deana negative, status post bilateral mastectomy, bilateral sentinel lymph node biopsy, and right axillary lymph node dissection in 1/2013, status post adjuvant chemotherapy and adjuvant chest wall radiotherapy, currently on adjuvant endocrine therapy with letrozole since 7/2013 and tolerating well besides mild arthralgia. She had a bone density 12/7/21 which showed worsening osteopenia of femur and hip, T score -2.4. She takes Vitamin D but not calcium because her calcium has been mildly elevated. Repeat serum calcium normal, MM panel normal. She was found to have large hiatal hernia and surgery on 11/21/22. Her Hb drooped to 9.5 g/dl after surgery, now normal. After hernia repair she was having symptoms consistent with vagal nerve mediated gastroparesis, she then had endoscopic pyloromyotomy, symptoms resolved.   1/25/2024: Manoj is here for follow up visit for bilateral invasive lobular carcinoma of the breast, ER/CT positive and HER2/deana negative, status post bilateral mastectomy, bilateral sentinel lymph node biopsy, and right axillary lymph node dissection in 1/2013, status post adjuvant chemotherapy and adjuvant chest wall radiotherapy, currently on adjuvant endocrine therapy with letrozole since 7/2013 and tolerating well besides mild arthralgia. She Completed 10 years treatment of Letrozole in 7/2023. She had a bone density 12/8/23 Showed:         ----------------------------------------------------------------- AP Spine (L1-L4)         0.998   -0.4      1.8     Normal Femoral Neck (Left) 0.552   -2.7     -0.7     Osteoporosis Total Hip (Left) 0.676   -2.2     -0.6     Osteopenia -which showed Osteoporosis of femur Neck and Osteopenia in the Hip. She takes Vitamin D.  After hernia repair, she was having symptoms consistent with vagal nerve mediated gastroparesis, she then had endoscopic pyloromyotomy, symptoms resolved.  She starts feeling better now.   3/29/24: Manoj is here for follow up visit. She has h/o bilateral invasive lobular carcinoma of the breast, ER/CT positive and HER2/deana negative, status post bilateral mastectomy, bilateral sentinel lymph node biopsy, and right axillary lymph node dissection in 1/2013, status post adjuvant chemotherapy and adjuvant chest wall radiotherapy, currently on adjuvant endocrine therapy with letrozole since 7/2013. She had CT AP in 2/2024 for abdominal pain which incidentally showed a lesion in T6. She had MRI spine on 3/11/24 which showed a 2.7 cm lesion within the left aspect of the T6 vertebral body and 7 mm marrow replacing lesion within the T11 vertebral body.  PET/CT on 3/28/24 showed Intense pathologic FDG uptake (SUV 9.3) coregistering with sclerotic lesion on the left side of T6 suspicious for biologic tumor activity. No other sites of abnormal FDG uptake. She is here today to discuss further management plan.  4/29/24: Manoj is here for follow up visit. She has h/o bilateral invasive lobular carcinoma of the breast, ER/CT positive and HER2/deana negative, status post bilateral mastectomy, bilateral sentinel lymph node biopsy, and right axillary lymph node dissection in 1/2013, status post adjuvant chemotherapy and adjuvant chest wall radiotherapy, Completed 10-year of endocrine therapy with Letrozole in 7/2023. She had CT AP in 2/2024 for abdominal pain which incidentally showed a lesion in T6. She had MRI spine on 3/11/24 which showed a 2.7 cm lesion within the left aspect of the T6 vertebral body and 7 mm marrow replacing lesion within the T11 vertebral body.  PET/CT on 3/28/24 showed Intense pathologic FDG uptake (SUV 9.3) coregistering with sclerotic lesion on the left side of T6 suspicious for biologic tumor activity. No other sites of abnormal FDG uptake. She had CT guided biopsy on 4/17/24 and is here today to discuss the result.     5/30/24: Manoj is here for follow up visit. She has h/o bilateral invasive lobular carcinoma of the breast, ER/CT positive and HER2/deana negative, status post bilateral mastectomy, bilateral sentinel lymph node biopsy, and right axillary lymph node dissection in 1/2013, status post adjuvant chemotherapy and adjuvant chest wall radiotherapy, Completed 10-year of endocrine therapy with Letrozole in 7/2023. She had CT AP in 2/2024 for abdominal pain which incidentally showed a lesion in T6. She had MRI spine on 3/11/24 which showed a 2.7 cm lesion within the left aspect of the T6 vertebral body and 7 mm marrow replacing lesion within the T11 vertebral body.  PET/CT on 3/28/24 showed Intense pathologic FDG uptake (SUV 9.3) coregistering with sclerotic lesion on the left side of T6 suspicious for biologic tumor activity.  She had CT guided biopsy on 4/17/24 which showed metastatic carcinoma consistent from breast primary. She saw Dr. Anaya and received palliative RT 2000cGy to T6 lesion and completed RT on 5/22/24.  6/27/24: Manoj is here for follow up visit. She has h/o bilateral invasive lobular carcinoma of the breast, ER/CT positive and HER2/deana negative, status post bilateral mastectomy, bilateral sentinel lymph node biopsy, and right axillary lymph node dissection in 1/2013, status post adjuvant chemotherapy and adjuvant chest wall radiotherapy, Completed 10-year of endocrine therapy with Letrozole in 7/2023. She had CT AP in 2/2024 for abdominal pain which incidentally showed a lesion in T6. She had MRI spine on 3/11/24 which showed a 2.7 cm lesion within the left aspect of the T6 vertebral body and 7 mm marrow replacing lesion within the T11 vertebral body.  PET/CT on 3/28/24 showed Intense pathologic FDG uptake (SUV 9.3) coregistering with sclerotic lesion on the left side of T6 suspicious for biologic tumor activity.  She had CT guided biopsy on 4/17/24 which showed metastatic carcinoma consistent from breast primary. She saw Dr. Anaya and received palliative RT 2000cGy to T6 lesion and completed RT on 5/22/24. She started on Xeloda and had 1st week treatment. She had loose stool but otherwise tolerated well.   08/01/24: bilateral sentinel lymph node biopsy, and right axillary lymph node dissection in 1/2013, status post adjuvant chemotherapy and adjuvant chest wall radiotherapy, Completed 10-year of endocrine therapy with Letrozole in 7/2023. She had CT AP in 2/2024 for abdominal pain which incidentally showed a lesion in T6. She had MRI spine on 3/11/24 which showed a 2.7 cm lesion within the left aspect of the T6 vertebral body and 7 mm marrow replacing lesion within the T11 vertebral body.  PET/CT on 3/28/24 showed Intense pathologic FDG uptake (SUV 9.3) coregistering with sclerotic lesion on the left side of T6 suspicious for biologic tumor activity.  She had CT guided biopsy on 4/17/24 which showed metastatic carcinoma consistent from breast primary. She saw Dr. Anaya and received palliative RT 2000cGy to T6 lesion and completed RT on 5/22/24. She started on Xeloda and had 1st week of June 2024. She is complaining of persistence diarrhea despite Imodium intake, currently on Xeloda 3tabs every 12 hrs for one week on and one week off.   8/29/24: Manoj is here for follow up visit. She has h/o bilateral invasive lobular carcinoma of the breast, ER/CT positive and HER2/deana negative, status post bilateral mastectomy, bilateral sentinel lymph node biopsy, and right axillary lymph node dissection in 1/2013, status post adjuvant chemotherapy and adjuvant chest wall radiotherapy, Completed 10-year of endocrine therapy with Letrozole in 7/2023. She had CT AP in 2/2024 for abdominal pain which incidentally showed a lesion in T6. She had MRI spine on 3/11/24 which showed a 2.7 cm lesion within the left aspect of the T6 vertebral body and 7 mm marrow replacing lesion within the T11 vertebral body.  PET/CT on 3/28/24 showed Intense pathologic FDG uptake (SUV 9.3) coregistering with sclerotic lesion on the left side of T6 suspicious for biologic tumor activity.  She had CT guided biopsy on 4/17/24 which showed metastatic carcinoma consistent from breast primary. She saw Dr. Anaya and received palliative RT 2000cGy to T6 lesion and completed RT on 5/22/24. She has been on Xeloda and did not tolerate well. She complains diarrhea, weight loss and eye irritation.   10/10/24 Manoj is here for follow up visit. She has h/o bilateral invasive lobular carcinoma of the breast, ER/CT positive and HER2/deana negative, status post bilateral mastectomy, bilateral sentinel lymph node biopsy, and right axillary lymph node dissection in 1/2013, status post adjuvant chemotherapy and adjuvant chest wall radiotherapy, Completed 10-year of endocrine therapy with Letrozole in 7/2023. She had CT AP in 2/2024 for abdominal pain which incidentally showed a lesion in T6. She had MRI spine on 3/11/24 which showed a 2.7 cm lesion within the left aspect of the T6 vertebral body and 7 mm marrow replacing lesion within the T11 vertebral body.  PET/CT on 3/28/24 showed Intense pathologic FDG uptake (SUV 9.3) coregistering with sclerotic lesion on the left side of T6 suspicious for biologic tumor activity.  She had CT guided biopsy on 4/17/24 which showed metastatic carcinoma consistent from breast primary. She saw Dr. Anaya and received palliative RT 2000cGy to T6 lesion and completed RT on 5/22/24. She took Xeloda for 3 months and did not tolerate well. Xeloda was discontinued. She had a PET scan in 9.24 which showed interval resolution of uptake in T6 and New uptake in L1 and new pleural effussion. She completed XRT 5 fractions in 9.24.  She is here for follow up.  11/11/24 Manoj is here for follow up visit. She has h/o bilateral invasive lobular carcinoma of the breast, ER/CT positive and HER2/deana negative, status post bilateral mastectomy, bilateral sentinel lymph node biopsy, and right axillary lymph node dissection in 1/2013, status post adjuvant chemotherapy and adjuvant chest wall radiotherapy, Completed 10-year of endocrine therapy with Letrozole in 7/2023. She developed recurrent disease with a lesion in T6 in 2/2024. CT guided biopsy on 4/17/24 showed metastatic carcinoma consistent from breast primary. She saw Dr. Anaya and received palliative RT 2000cGy to T6 lesion and completed RT on 5/22/24. She took Xeloda for 3 months and did not tolerate well. Xeloda was discontinued. She had repeat PET/CT in 9.24 which showed interval resolution of uptake in T6 but new uptake in L1 and new pleural effusion. She completed XRT 5 fractions. She complains low back pain.   12/30/24 Manoj is here for follow up visit. Her spouse is with her. She has h/o bilateral invasive lobular carcinoma of the breast, ER/CT positive and HER2/deana negative, s/p bilateral mastectomy, bilateral sentinel lymph node biopsy, and right axillary lymph node dissection in 1/2013, s/p adjuvant chemotherapy and adjuvant chest wall radiotherapy, Completed 10-year of endocrine therapy with Letrozole in 7/2023. She developed recurrent disease with a lesion in T6 in 2/2024. CT guided biopsy on 4/17/24 showed metastatic carcinoma consistent from breast primary. She saw Dr. Anaya and received palliative RT 2000cGy to T6 lesion and completed RT on 5/22/24. She took Xeloda for 3 months and did not tolerate well. Xeloda was discontinued. She had repeat PET/CT in 9/16/24 which showed interval resolution of uptake in T6 but new uptake in L1 and new pleural effusion. MR Lumbar spine on 9/25/24 showed Infiltrative enhancing bone marrow signal abnormality within the T11 and L1 vertebral bodies most compatible with osseous metastatic disease. Mild compression deformity of L1, likely pathologic. Multilevel degenerative changes as described. She completed XRT 5 fractions on 10/1/24 -10/7/24. She has a history of fall on 9/2024. CT chest 12/12/24 showed no pleural effusion. She complains low back pain, taking Aleeve as needed with relief.  1/14/25: Manoj is here for follow up visit. Her spouse is with her. She has h/o bilateral invasive lobular carcinoma of the breast, ER/CT positive and HER2/deana negative, s/p bilateral mastectomy, bilateral sentinel lymph node biopsy, and right axillary lymph node dissection in 1/2013, s/p adjuvant chemotherapy and adjuvant chest wall radiotherapy, Completed 10-year of endocrine therapy with Letrozole in 7/2023. She developed recurrent disease with a lesion in T6 in 2/2024. CT guided biopsy on 4/17/24 showed metastatic carcinoma consistent from breast primary. She saw Dr. Anaya and received palliative RT 2000cGy to T6 lesion and completed RT on 5/22/24. She took Xeloda for 3 months and did not tolerate. Xeloda was discontinued. She had repeat PET/CT in 9/16/24 which showed interval resolution of uptake in T6 but new uptake in L1 and new pleural effusion.  She completed XRT 5 fractions on 10/1/24 -10/7/24. She has been taking Letrozole. She feels some aches and pains.   2/20/25 Manoj is here for follow up visit. Her spouse is with her. She has h/o bilateral invasive lobular carcinoma of the breast, ER/CT positive and HER2/deana negative, s/p bilateral mastectomy, bilateral sentinel lymph node biopsy, and right axillary lymph node dissection in 1/2013, s/p adjuvant chemotherapy and adjuvant chest wall radiotherapy, Completed 10-year of endocrine therapy with Letrozole in 7/2023. She developed recurrent disease with a lesion in T6 in 2/2024. CT guided biopsy on 4/17/24 showed metastatic carcinoma consistent from breast primary. She saw Dr. Anaya and received palliative RT 2000cGy to T6 lesion and completed RT on 5/22/24. She took Xeloda for 3 months and did not tolerate. Xeloda was discontinued. She had repeat PET/CT in 9/16/24 which showed interval resolution of uptake in T6 but new uptake in L1 and new pleural effusion. She completed XRT 5 fractions on 10/1/24 -10/7/24. She has been taking Letrozole. PET/CT on 1/7/25 showed several new foci of pathologic FDG uptake in bones, suspicious for biologic tumor activity. Interval resolution of previously noted small right pleural effusion. She feels some aches and pains. She started weekly Taxol, 2 weeks on and 1 week off on 1/31/25, s/p cycle #1 and is tolerating well except for diarrhea yesterday, managed with Imodium. She denies nausea, vomiting, or numbness or tingling of hands/feet. She saw Dr Taylor and is planned for L1 kyphoplasty on 3/18/25.  3/13/25 Manoj is here for follow up visit. Her spouse is with her. She has h/o bilateral invasive lobular carcinoma of the breast, ER/CT positive and HER2/deana negative, s/p bilateral mastectomy, bilateral sentinel lymph node biopsy, and right axillary lymph node dissection in 1/2013, s/p adjuvant chemotherapy and adjuvant chest wall radiotherapy, Completed 10-year of endocrine therapy with Letrozole in 7/2023. She developed recurrent disease with a lesion in T6 in 2/2024. CT guided biopsy on 4/17/24 showed metastatic carcinoma consistent from breast primary. She saw Dr. Anaya and received palliative RT 2000cGy to T6 lesion and completed RT on 5/22/24. She took Xeloda for 3 months and did not tolerate. Xeloda was discontinued. She had repeat PET/CT in 9/16/24 which showed interval resolution of uptake in T6 but new uptake in L1 and new pleural effusion. She completed XRT 5 fractions on 10/1/24 -10/7/24. She has been taking Letrozole. PET/CT on 1/7/25 showed several new foci of pathologic FDG uptake in bones, suspicious for biologic tumor activity. She started weekly Taxol, 2 weeks on and 1 week off on 1/31/25, s/p 2 cycles and tolerated.    4/3/25 Manoj is here for follow up visit. Her spouse is with her. She has h/o bilateral invasive lobular carcinoma of the breast, ER/CT positive and HER2/deana negative, s/p bilateral mastectomy, bilateral sentinel lymph node biopsy, and right axillary lymph node dissection in 1/2013, s/p adjuvant chemotherapy and adjuvant chest wall radiotherapy, Completed 10-year of endocrine therapy with Letrozole in 7/2023. She developed recurrent disease with a lesion in T6 in 2/2024. CT guided biopsy on 4/17/24 showed metastatic carcinoma consistent from breast primary. She saw Dr. Anaya and received palliative RT 2000cGy to T6 lesion and completed RT on 5/22/24. She took Xeloda for 3 months and did not tolerate. Xeloda was discontinued. She had repeat PET/CT in 9/16/24 which showed interval resolution of uptake in T6 but new uptake in L1 and new pleural effusion. She completed XRT 5 fractions on 10/1/24 -10/7/24. She was taking Letrozole. PET/CT on 1/7/25 showed several new foci of pathologic FDG uptake in bones, suspicious for biologic tumor activity. She started weekly Taxol, 2 weeks on and 1 week off on 1/31/25, s/p 3 cycles and tolerated. She feels some numbness and tingling in her hands and feet. She does not have n/v/d, mouth sore, chill or fever.    4/16/25 Manoj is here for follow up visit. Her spouse is with her. She has h/o bilateral invasive lobular carcinoma of the breast, ER/CT positive and HER2/deana negative, s/p bilateral mastectomy, bilateral sentinel lymph node biopsy, and right axillary lymph node dissection in 1/2013, s/p adjuvant chemotherapy and adjuvant chest wall radiotherapy, Completed 10-year of endocrine therapy with Letrozole in 7/2023. She developed recurrent disease with a lesion in T6 in 2/2024. CT guided biopsy on 4/17/24 showed metastatic carcinoma consistent from breast primary. She saw Dr. Anaya and received palliative RT 2000cGy to T6 lesion and completed RT on 5/22/24. She took Xeloda for 3 months and did not tolerate. Xeloda was discontinued. She had repeat PET/CT in 9/16/24 which showed interval resolution of uptake in T6 but new uptake in L1 and new pleural effusion. She completed XRT 5 fractions on 10/1/24 -10/7/24. She was taking Letrozole. PET/CT on 1/7/25 showed several new foci of pathologic FDG uptake in bones, suspicious for biologic tumor activity. She started weekly Taxol, 2 weeks on and 1 week off on 1/31/25, s/p 3 cycles and tolerated. She feels some numbness and tingling in her hands and feet. She does not have n/v/d, mouth sore, chill or fever.    4/30/25 Manoj is here for follow up visit and chemotherapy. Her  is with her. She has h/o bilateral invasive lobular carcinoma of the breast, ER/CT positive and HER2/deana negative, s/p bilateral mastectomy, bilateral sentinel lymph node biopsy, and right axillary lymph node dissection in 1/2013, s/p adjuvant chemotherapy and adjuvant chest wall radiotherapy, Completed 10-year of endocrine therapy with Letrozole in 7/2023. She developed recurrent disease with a lesion in T6 in 2/2024. CT guided biopsy on 4/17/24 showed metastatic carcinoma consistent from breast primary. She received palliative RT 2000cGy to T6 lesion and completed RT on 5/22/24. She took Xeloda for 3 months and did not tolerate. Xeloda was discontinued. She had repeat PET/CT in 9/16/24 which showed interval resolution of uptake in T6 but new uptake in L1 and new pleural effusion. She completed XRT 5 fractions on 10/1/24 -10/7/24. She was taking Letrozole. PET/CT on 1/7/25 showed several new foci of pathologic FDG uptake in bones, suspicious for biologic tumor activity. She started weekly Taxol, 2 weeks on and 1 week off on 1/31/25, s/p 4 cycles and tolerated. She has mild neuropathy symptoms. She does not have n/v/d, mouth sore, chill or fever.    5/21/25 Manoj is here for follow up visit and chemotherapy. Her  is with her. She has h/o bilateral invasive lobular carcinoma of the breast, ER/CT positive and HER2/deana negative, s/p bilateral mastectomy, bilateral sentinel lymph node biopsy, and right axillary lymph node dissection in 1/2013, s/p adjuvant chemotherapy and adjuvant chest wall radiotherapy, Completed 10-year of endocrine therapy with Letrozole in 7/2023. She developed recurrent disease with a lesion in T6 in 2/2024. CT guided biopsy on 4/17/24 showed metastatic carcinoma consistent from breast primary, triple negative. She received palliative RT 2000cGy to T6 lesion and completed RT on 5/22/24. She took Xeloda for 3 months and did not tolerate. Xeloda was discontinued. She had repeat PET/CT in 9/16/24 which showed interval resolution of uptake in T6 but new uptake in L1 and new pleural effusion. She completed XRT 5 fractions on 10/1/24 -10/7/24. She was taking Letrozole. PET/CT on 1/7/25 showed several new foci of pathologic FDG uptake in bones, suspicious for biologic tumor activity. She started weekly Taxol, 2 weeks on and 1 week off on 1/31/25, s/p 5 cycles and is tolerating. She feels neuropathy which is getting worse on the right foot. She had explosive diarrhea last Sunday which resolved after taking Imodium. She gets intermittent back pain, managed with Aleeve. She denies nausea, vomiting, fever or chills.

## 2025-05-27 NOTE — PHYSICAL EXAM
[Fully active, able to carry on all pre-disease performance without restriction] : Status 0 - Fully active, able to carry on all pre-disease performance without restriction [Normal] : affect appropriate [de-identified] : Right chestchemoport, intact and no erythema noted. [de-identified] :  Status post bilateral mastectomy and autologous tissue reconstruction. There is no skin lesion and no palpable axillary lymphadenopathy. [de-identified] : Lower abdominal fat necrosis. Abdominal scar present.  [de-identified] : Low back pain 2' metastatic disease.

## 2025-05-27 NOTE — ASSESSMENT
[FreeTextEntry1] : Assessment and Plan: # H/O Bilateral invasive lobular carcinoma of the breast, ER/AL positive and HER2/deana negative, status post bilateral mastectomy, bilateral sentinel lymph node biopsy, and right axillary lymph node dissection, status post adjuvant chemotherapy and adjuvant chest wall radiotherapy, Completed 10-year of endocrine therapy with Letrozole in 7/2023.  - S/P b/l mastectomy. There is no palpable abnormality.  # Recurrent breast cancer(ER/AL/Her 2 negative Biopsy proven) with T6 vertebral body lesion S/P XRT(5/24) now presenting with new L1 lesion S/P XRT  9.24 - MRI spine on 3/11/24 showed a 2.7 cm lesion within the left aspect of the T6 vertebral body and 7 mm marrow replacing lesion within the T11 vertebral body.  - PET/CT on 3/28/24 showed Intense pathologic FDG uptake (SUV 9.3) coregistering with sclerotic lesion on the left side of T6 suspicious for biologic tumor activity. No other sites of abnormal FDG uptake.  - CT guided core bx of the lesion in T6 vertebral body on 4/17/24 showed metastatic carcinoma, favor breast primary, ER/AL negative. Her2 is negative.  - S/P palliative RT 2000cGy to T6 lesion and completed RT on 5/22/24. - She took Xeloda for 3 months, 1500 mg twice a day, one week on and one week off. Due to frequent diarrhea, dose reduced to Xeloda 1000 mg q12h, 1 week on and 1 week off.  She developed multiple side effects and Xeloda was discontinued.  -- PET/CT in 9/16/24 showed interval resolution of uptake in T6 and New uptake in L1 and new pleural effusion.  -- MRI of lumbar spine 9/25/24 showed infiltrative enhancing bone marrow signal abnormality within the T11 and L1 vertebral bodies most compatible with osseous metastatic disease. Mild compression deformity of L1, likely pathologic. -- She received XRT 5 fractions 2000cGy to Vertebrae, Lumbar on 10/1/24 - 10/7/24. -- Recurrent breast cancer in the spine is triple negative. She had palliative RT x 2. There was no evidence of visceral disease or other site disease. Since her initial breast cancer was hormone receptor and bone met biopsy could have inadequate IHC staining for hormone receptor, she was given Letrozole.  -- Repeat PET/CT on 1/7/25 showed several new foci of pathologic FDG uptake in bones, suspicious for biologic tumor activity. Interval resolution of previously noted small right pleural effusion. -- In light of progression of disease, she switched to Taxol 80 mg weekly, 2 weeks on and one week off. S/P 5 cycles. Repeat PET/CT on 4/23/25 showed stable bony mets with SUV values trending down in some areas and trending up slightly in the other areas. There is no visceral mets.  -- Will continue weekly Taxol and proceed with the 6th cycle.  -- Continue X-Geva injection monthly. Dental clearance was done.  -- Mild elevated AST and ALT. Resolved. Will continue monitoring.  -- Order blood work: CBC, BMP, LFT, Mg, CEA and .  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 She saw neurosurgeon.   #Low back pain.  -- S/P L1 kyphoplasty on 4/11/25. Pain has reduced.  -- NSAIDs as needed.  -- F/U with Neurosurgery, Dr Taylor as scheduled.  RTO in 3 weeks. Case was seen and discussed with Dr. Tovar who agreed with assessment and plan.  
[FreeTextEntry1] : Assessment and Plan: # H/O Bilateral invasive lobular carcinoma of the breast, ER/MA positive and HER2/deana negative, status post bilateral mastectomy, bilateral sentinel lymph node biopsy, and right axillary lymph node dissection, status post adjuvant chemotherapy and adjuvant chest wall radiotherapy, Completed 10-year of endocrine therapy with Letrozole in 7/2023.  - S/P b/l mastectomy. There is no palpable abnormality.  # Recurrent breast cancer(ER/MA/Her 2 negative Biopsy proven) with T6 vertebral body lesion S/P XRT(5/24) now presenting with new L1 lesion S/P XRT  9.24 - MRI spine on 3/11/24 showed a 2.7 cm lesion within the left aspect of the T6 vertebral body and 7 mm marrow replacing lesion within the T11 vertebral body.  - PET/CT on 3/28/24 showed Intense pathologic FDG uptake (SUV 9.3) coregistering with sclerotic lesion on the left side of T6 suspicious for biologic tumor activity. No other sites of abnormal FDG uptake.  - CT guided core bx of the lesion in T6 vertebral body on 4/17/24 showed metastatic carcinoma, favor breast primary, ER/MA negative. Her2 is negative.  - S/P palliative RT 2000cGy to T6 lesion and completed RT on 5/22/24. - She took Xeloda for 3 months, 1500 mg twice a day, one week on and one week off. Due to frequent diarrhea, dose reduced to Xeloda 1000 mg q12h, 1 week on and 1 week off.  She developed multiple side effects and Xeloda was discontinued.  -- PET/CT in 9/16/24 showed interval resolution of uptake in T6 and New uptake in L1 and new pleural effusion.  -- MRI of lumbar spine 9/25/24 showed infiltrative enhancing bone marrow signal abnormality within the T11 and L1 vertebral bodies most compatible with osseous metastatic disease. Mild compression deformity of L1, likely pathologic. -- She received XRT 5 fractions 2000cGy to Vertebrae, Lumbar on 10/1/24 - 10/7/24. -- Recurrent breast cancer in the spine is triple negative. She had palliative RT x 2. There was no evidence of visceral disease or other site disease. Since her initial breast cancer was hormone receptor and bone met biopsy could have inadequate IHC staining for hormone receptor, she was given Letrozole.  -- Repeat PET/CT on 1/7/25 showed several new foci of pathologic FDG uptake in bones, suspicious for biologic tumor activity. Interval resolution of previously noted small right pleural effusion. -- In light of progression of disease, she switched to Taxol 80 mg weekly, 2 weeks on and one week off. S/P 5 cycles. Repeat PET/CT on 4/23/25 showed stable bony mets with SUV values trending down in some areas and trending up slightly in the other areas. There is no visceral mets.  -- Will continue weekly Taxol and proceed with the 6th cycle.  -- Continue X-Geva injection monthly. Dental clearance was done.  -- Mild elevated AST and ALT. Resolved. Will continue monitoring.  -- Order blood work: CBC, BMP, LFT, Mg, CEA and .  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 She saw neurosurgeon.   #Low back pain.  -- S/P L1 kyphoplasty on 4/11/25. Pain has reduced.  -- NSAIDs as needed.  -- F/U with Neurosurgery, Dr Taylor as scheduled.  RTO in 3 weeks. Case was seen and discussed with Dr. Tovar who agreed with assessment and plan.  
[FreeTextEntry1] : Assessment and Plan: # H/O Bilateral invasive lobular carcinoma of the breast, ER/MD positive and HER2/deana negative, status post bilateral mastectomy, bilateral sentinel lymph node biopsy, and right axillary lymph node dissection, status post adjuvant chemotherapy and adjuvant chest wall radiotherapy, Completed 10-year of endocrine therapy with Letrozole in 7/2023.  - S/P b/l mastectomy. There is no palpable abnormality.  # Recurrent breast cancer(ER/MD/Her 2 negative Biopsy proven) with T6 vertebral body lesion S/P XRT(5/24) now presenting with new L1 lesion S/P XRT  9.24 - MRI spine on 3/11/24 showed a 2.7 cm lesion within the left aspect of the T6 vertebral body and 7 mm marrow replacing lesion within the T11 vertebral body.  - PET/CT on 3/28/24 showed Intense pathologic FDG uptake (SUV 9.3) coregistering with sclerotic lesion on the left side of T6 suspicious for biologic tumor activity. No other sites of abnormal FDG uptake.  - CT guided core bx of the lesion in T6 vertebral body on 4/17/24 showed metastatic carcinoma, favor breast primary, ER/MD negative. Her2 is negative.  - S/P palliative RT 2000cGy to T6 lesion and completed RT on 5/22/24. - She took Xeloda for 3 months, 1500 mg twice a day, one week on and one week off. Due to frequent diarrhea, dose reduced to Xeloda 1000 mg q12h, 1 week on and 1 week off.  She developed multiple side effects and Xeloda was discontinued.  -- PET/CT in 9/16/24 showed interval resolution of uptake in T6 and New uptake in L1 and new pleural effusion.  -- MRI of lumbar spine 9/25/24 showed infiltrative enhancing bone marrow signal abnormality within the T11 and L1 vertebral bodies most compatible with osseous metastatic disease. Mild compression deformity of L1, likely pathologic. -- She received XRT 5 fractions 2000cGy to Vertebrae, Lumbar on 10/1/24 - 10/7/24. -- Recurrent breast cancer in the spine is triple negative. She had palliative RT x 2. There was no evidence of visceral disease or other site disease. Since her initial breast cancer was hormone receptor and bone met biopsy could have inadequate IHC staining for hormone receptor, she was given Letrozole.  -- Repeat PET/CT on 1/7/25 showed several new foci of pathologic FDG uptake in bones, suspicious for biologic tumor activity. Interval resolution of previously noted small right pleural effusion. -- In light of progression of disease, she switched to Taxol 80 mg weekly, 2 weeks on and one week off. S/P 5 cycles. Repeat PET/CT on 4/23/25 showed stable bony mets with SUV values trending down in some areas and trending up slightly in the other areas. There is no visceral mets.  -- Will continue weekly Taxol and proceed with the 6th cycle.  -- Continue X-Geva injection monthly. Dental clearance was done.  -- Mild elevated AST and ALT. Resolved. Will continue monitoring.  -- Order blood work: CBC, BMP, LFT, Mg, CEA and .  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 She saw neurosurgeon.   #Low back pain.  -- S/P L1 kyphoplasty on 4/11/25. Pain has reduced.  -- NSAIDs as needed.  -- F/U with Neurosurgery, Dr Taylor as scheduled.  RTO in 3 weeks. Case was seen and discussed with Dr. Tovar who agreed with assessment and plan.  
There are no Wet Read(s) to document.

## 2025-05-27 NOTE — PHYSICAL EXAM
[Fully active, able to carry on all pre-disease performance without restriction] : Status 0 - Fully active, able to carry on all pre-disease performance without restriction [Normal] : affect appropriate [de-identified] : Right chestchemoport, intact and no erythema noted. [de-identified] :  Status post bilateral mastectomy and autologous tissue reconstruction. There is no skin lesion and no palpable axillary lymphadenopathy. [de-identified] : Lower abdominal fat necrosis. Abdominal scar present.  [de-identified] : Low back pain 2' metastatic disease.

## 2025-05-27 NOTE — REVIEW OF SYSTEMS
[Diarrhea: Grade 1 - Increase of <4 stools per day over baseline; mild increase in ostomy output compared to baseline] : Diarrhea: Grade 1 - Increase of <4 stools per day over baseline; mild increase in ostomy output compared to baseline [Joint Pain] : joint pain [Muscle Pain] : muscle pain [Negative] : Allergic/Immunologic [FreeTextEntry7] : Intermittent diarrhea, managed with Imodium. [FreeTextEntry9] : Chronic low back pain, managed with Aleeve.

## 2025-06-11 NOTE — PHYSICAL EXAM
[Fully active, able to carry on all pre-disease performance without restriction] : Status 0 - Fully active, able to carry on all pre-disease performance without restriction [Normal] : affect appropriate [de-identified] : Right chestchemoport, intact and no erythema noted. [de-identified] :  Status post bilateral mastectomy and autologous tissue reconstruction. There is no skin lesion and no palpable axillary lymphadenopathy. [de-identified] : Low back pain 2' metastatic disease. [de-identified] : Lower abdominal fat necrosis. Abdominal scar present.

## 2025-06-11 NOTE — CONSULT LETTER
[Dear  ___] : Dear  [unfilled], [Courtesy Letter:] : I had the pleasure of seeing your patient, [unfilled], in my office today. [Sincerely,] : Sincerely, [Please see my note below.] : Please see my note below. [DrIsabel  ___] : Dr. HILLIARD [FreeTextEntry3] : Nathan Tovar MD

## 2025-06-11 NOTE — ASSESSMENT
[FreeTextEntry1] : Assessment and Plan: # H/O Bilateral invasive lobular carcinoma of the breast, ER/IL positive and HER2/deana negative, status post bilateral mastectomy, bilateral sentinel lymph node biopsy, and right axillary lymph node dissection, status post adjuvant chemotherapy and adjuvant chest wall radiotherapy, Completed 10-year of endocrine therapy with Letrozole in 7/2023.  - S/P b/l mastectomy. There is no palpable abnormality.  # Recurrent breast cancer(ER/IL/Her 2 negative Biopsy proven) with T6 vertebral body lesion S/P XRT(5/24) now presenting with new L1 lesion S/P XRT  9.24 - MRI spine on 3/11/24 showed a 2.7 cm lesion within the left aspect of the T6 vertebral body and 7 mm marrow replacing lesion within the T11 vertebral body.  - PET/CT on 3/28/24 showed Intense pathologic FDG uptake (SUV 9.3) coregistering with sclerotic lesion on the left side of T6 suspicious for biologic tumor activity. No other sites of abnormal FDG uptake.  - CT guided core bx of the lesion in T6 vertebral body on 4/17/24 showed metastatic carcinoma, favor breast primary, ER/IL negative. Her2 is negative.  - S/P palliative RT 2000cGy to T6 lesion and completed RT on 5/22/24. - She took Xeloda for 3 months, 1500 mg twice a day, one week on and one week off. Due to frequent diarrhea, dose reduced to Xeloda 1000 mg q12h, 1 week on and 1 week off.  She developed multiple side effects and Xeloda was discontinued.  -- PET/CT in 9/16/24 showed interval resolution of uptake in T6 and New uptake in L1 and new pleural effusion.  -- MRI of lumbar spine 9/25/24 showed infiltrative enhancing bone marrow signal abnormality within the T11 and L1 vertebral bodies most compatible with osseous metastatic disease. Mild compression deformity of L1, likely pathologic. -- She received XRT 5 fractions 2000cGy to Vertebrae, Lumbar on 10/1/24 - 10/7/24. -- Recurrent breast cancer in the spine is triple negative. Since her initial breast cancer was hormone receptor and bone met biopsy could have inadequate IHC staining for hormone receptor, she was given Letrozole.  -- Repeat PET/CT on 1/7/25 showed several new foci of pathologic FDG uptake in bones, suspicious for biologic tumor activity. Interval resolution of previously noted small right pleural effusion. -- In light of progression of disease, she switched to Taxol 80 mg weekly, 2 weeks on and one week off. S/P 6 cycles. Repeat PET/CT on 4/23/25 showed stable bony mets with SUV values trending down in some areas and trending up slightly in the other areas. There is no visceral mets.  -- Will proceed with 7th cycle of weekly Taxol, 2 weeks on and 1 week off. -- Will order liquid NGS and PD-L1 to look for additional treatment options.  -- Continue X-Geva injection monthly. Dental clearance was done. Calcium was low.  Repeat BMP. 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 Will start Gabapentin.  -- Mild elevated AST and ALT. Resolved. Will continue monitoring.  -- Order blood work: CBC, BMP, LFT, Mg, CEA and .  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 She saw neurosurgeon.   #Low back pain.  -- S/P L1 kyphoplasty on 4/11/25. Pain has reduced.  -- NSAIDs as needed.  -- F/U with Neurosurgery, Dr Taylor as scheduled.  RTO in 3 weeks.

## 2025-06-11 NOTE — HISTORY OF PRESENT ILLNESS
[de-identified] : This 68-year-old female is here today for a followup visit.  She has a history of bilateral invasive lobular carcinoma, stage IIIA (pT1c N2a M0) in the right breast, and stage IA (pT1b N0 M0) in the left breast.  Both sided breast cancer were ER/MS positive and HER2/deana negative.  She had bilateral mastectomy, bilateral sentinel lymph node biopsy, and right axillary lymph node dissection at [facility] on 01/08/2013.  She received adjuvant chemotherapy with Adriamycin and Cytoxan followed by paclitaxel.  She also received post mastectomy adjuvant radiotherapy to right side chest wall.  She has been on adjuvant endocrine therapy with Femara since 7/2013  and has been tolerating well.  She had genetic test for BRCA mutations.  She is negative for BRCA1/2 mutations.  In 10/2014, she had a bone density at [facility], which showed osteopenia. Her latest bone density was done 7/21/16 which showed osteopenia. MRI of breasts was done on 1/30/17 which showed no MR evidence of malignancy in either breast. Recommendation: Unless otherwise indicated by clinical findings, annual screening mammography recommended. This can be alternated at 6 month intervals with bilateral screening breast MRI.\par   She has been taking calcium and vitamin D supplements. Latest colonoscopy 11/2016 pt states it was negative. Saw Dr. Garcia 1/2017.\par  She saw Dr. Rankin recently. She is scheduled to have a revision of her lower abdominal fat necrosis on 1/2018.\par  \par   [de-identified] : 6/8/18: She stopped letrozole few months ago as she was found to be in Afib by her cardiologist on Pre-op clearance for abdominal fat necrosis. However her cardiologist () told her that you can resume Letrozole. She had surgery for abdominal fat necrosis and she is going for surgery for incisional hernia by .   12/13/18: The patient is here for followup visit. She has been taking Letrozole daily (since 7/2013) and tolerating well. She had hernia repair surgery. She complains some pain in her knees. She had MRI breast in 1 2017. There was no suspicious finding.  6/14/19; The patient is here for followup visit. She has been taking Letrozole daily (since 7/2013) and tolerating well. She complains some pain in her knees. She does not have other new complains. She has mild elevated calcium. PHT was normal.  12/13/2019 :  The patient is here for follow up. She has been on letrozole daily and tolerating well. She had a bone density 12/2/109 which showed osteopenia of femur and hip. She is not taking calcium and vit D since her last calcium was elevated 10.7. She had a recent US breasts 12/2019 for breast pain which was negative. She was seen by her cardiologist and had blood work in the summer. She was told that she has severe anemia. She started her on iron pills. She denies any vaginal bleeding or rectal bleeding  6/12/2020: The patient is here for follow up. She had bilateral invasive lobular carcinoma of the breast, ER/TX positive and HER2/deana negative, status post bilateral mastectomy, bilateral sentinel lymph node biopsy, and right axillary lymph node dissection in 1/2013, status post adjuvant chemotherapy and adjuvant chest wall radiotherapy, currently on adjuvant endocrine therapy with letrozole and tolerating well. She had a bone density 12/2/19 which showed osteopenia of femur and hip. She is not taking calcium and vit D since her last calcium was elevated 10.7. She had a recent US breasts 12/2019 for breast pain which was negative. She does not have new complains.  12/02/2020: MANOJ is here for follow up visit for bilateral invasive lobular carcinoma of the breast, ER/TX positive and HER2/deana negative, status post bilateral mastectomy, bilateral sentinel lymph node biopsy, and right axillary lymph node dissection in 1/2013, status post adjuvant chemotherapy and adjuvant chest wall radiotherapy, currently on adjuvant endocrine therapy with letrozole and tolerating well besides mild arthralgia. She had a bone density 12/2/19 which showed osteopenia of femur and hip. She continues on Vitamin D daily. She denies any new breast symptoms at this time. In addition, patient was diagnosed with COVID in 3/2020.   06/02/2021: MANOJ is here for follow up visit for bilateral invasive lobular carcinoma of the breast, ER/TX positive and HER2/deana negative, status post bilateral mastectomy, bilateral sentinel lymph node biopsy, and right axillary lymph node dissection in 1/2013, status post adjuvant chemotherapy and adjuvant chest wall radiotherapy, currently on adjuvant endocrine therapy with letrozole since 7/2013 and tolerating well besides mild arthralgia. She had a bone density 12/2/19 which showed osteopenia of femur and hip. She continues on Vitamin D daily. She denies any new breast symptoms at this time. she had right breast skin tag biopsied by dermatologist; which was benign.   1/5/22 MANOJ is here for follow up visit for bilateral invasive lobular carcinoma of the breast, ER/TX positive and HER2/deana negative, status post bilateral mastectomy, bilateral sentinel lymph node biopsy, and right axillary lymph node dissection in 1/2013, status post adjuvant chemotherapy and adjuvant chest wall radiotherapy, currently on adjuvant endocrine therapy with letrozole since 7/2013 and tolerating well besides mild arthralgia. She had a bone density 12/7/21 which showed worsening osteopenia of femur and hip, T score -2.4. She is not complaint with Vitamin D, does not take calcium due to mildly elevated Calcium. She denies any new breast symptoms at this time. She was experiencing lower abdominal and pelvic pain in Sept had US which was unremarkable. In addition, had Thyroid US due to hypercalcemia which was unremarkable, In 8/2021 TSH 2.36, Calcium 10, and PTH was 78.   7/21/22: Manoj is here for follow up visit for bilateral invasive lobular carcinoma of the breast, ER/TX positive and HER2/deana negative, status post bilateral mastectomy, bilateral sentinel lymph node biopsy, and right axillary lymph node dissection in 1/2013, status post adjuvant chemotherapy and adjuvant chest wall radiotherapy, currently on adjuvant endocrine therapy with letrozole since 7/2013 and tolerating well besides mild arthralgia. She had a bone density 12/7/21 which showed worsening osteopenia of femur and hip, T score -2.4. She takes Vitamin D but not calcium because her calcium has been mildly elevated. She was found to have large hiatal hernia and she has had on and off diarrhea for long time. She has been seen by Dr. Borjas.   01/26/2023 Manoj is here for follow up visit for bilateral invasive lobular carcinoma of the breast, ER/TX positive and HER2/deana negative, status post bilateral mastectomy, bilateral sentinel lymph node biopsy, and right axillary lymph node dissection in 1/2013, status post adjuvant chemotherapy and adjuvant chest wall radiotherapy, currently on adjuvant endocrine therapy with letrozole since 7/2013 and tolerating well besides mild arthralgia. She had a bone density 12/7/21 which showed worsening osteopenia of femur and hip, T score -2.4. She takes Vitamin D but not calcium because her calcium has been mildly elevated. She was found to have large hiatal hernia and surgery on 11/21/22. Her Hb drooped to 9.5 g/dl after surgery. She was found to have high serum calcium but repeat calcium was normal. PTH was also within normal limit.  7/20/23 Manoj is here for follow up visit for bilateral invasive lobular carcinoma of the breast, ER/TX positive and HER2/deana negative, status post bilateral mastectomy, bilateral sentinel lymph node biopsy, and right axillary lymph node dissection in 1/2013, status post adjuvant chemotherapy and adjuvant chest wall radiotherapy, currently on adjuvant endocrine therapy with letrozole since 7/2013 and tolerating well besides mild arthralgia. She had a bone density 12/7/21 which showed worsening osteopenia of femur and hip, T score -2.4. She takes Vitamin D but not calcium because her calcium has been mildly elevated. Repeat serum calcium normal, MM panel normal. She was found to have large hiatal hernia and surgery on 11/21/22. Her Hb drooped to 9.5 g/dl after surgery, now normal. After hernia repair she was having symptoms consistent with vagal nerve mediated gastroparesis, she then had endoscopic pyloromyotomy, symptoms resolved.   1/25/2024: Manoj is here for follow up visit for bilateral invasive lobular carcinoma of the breast, ER/TX positive and HER2/deana negative, status post bilateral mastectomy, bilateral sentinel lymph node biopsy, and right axillary lymph node dissection in 1/2013, status post adjuvant chemotherapy and adjuvant chest wall radiotherapy, currently on adjuvant endocrine therapy with letrozole since 7/2013 and tolerating well besides mild arthralgia. She Completed 10 years treatment of Letrozole in 7/2023. She had a bone density 12/8/23 Showed:         ----------------------------------------------------------------- AP Spine (L1-L4)         0.998   -0.4      1.8     Normal Femoral Neck (Left) 0.552   -2.7     -0.7     Osteoporosis Total Hip (Left) 0.676   -2.2     -0.6     Osteopenia -which showed Osteoporosis of femur Neck and Osteopenia in the Hip. She takes Vitamin D.  After hernia repair, she was having symptoms consistent with vagal nerve mediated gastroparesis, she then had endoscopic pyloromyotomy, symptoms resolved.  She starts feeling better now.   3/29/24: Manoj is here for follow up visit. She has h/o bilateral invasive lobular carcinoma of the breast, ER/TX positive and HER2/deana negative, status post bilateral mastectomy, bilateral sentinel lymph node biopsy, and right axillary lymph node dissection in 1/2013, status post adjuvant chemotherapy and adjuvant chest wall radiotherapy, currently on adjuvant endocrine therapy with letrozole since 7/2013. She had CT AP in 2/2024 for abdominal pain which incidentally showed a lesion in T6. She had MRI spine on 3/11/24 which showed a 2.7 cm lesion within the left aspect of the T6 vertebral body and 7 mm marrow replacing lesion within the T11 vertebral body.  PET/CT on 3/28/24 showed Intense pathologic FDG uptake (SUV 9.3) coregistering with sclerotic lesion on the left side of T6 suspicious for biologic tumor activity. No other sites of abnormal FDG uptake. She is here today to discuss further management plan.  4/29/24: Manoj is here for follow up visit. She has h/o bilateral invasive lobular carcinoma of the breast, ER/TX positive and HER2/daena negative, status post bilateral mastectomy, bilateral sentinel lymph node biopsy, and right axillary lymph node dissection in 1/2013, status post adjuvant chemotherapy and adjuvant chest wall radiotherapy, Completed 10-year of endocrine therapy with Letrozole in 7/2023. She had CT AP in 2/2024 for abdominal pain which incidentally showed a lesion in T6. She had MRI spine on 3/11/24 which showed a 2.7 cm lesion within the left aspect of the T6 vertebral body and 7 mm marrow replacing lesion within the T11 vertebral body.  PET/CT on 3/28/24 showed Intense pathologic FDG uptake (SUV 9.3) coregistering with sclerotic lesion on the left side of T6 suspicious for biologic tumor activity. No other sites of abnormal FDG uptake. She had CT guided biopsy on 4/17/24 and is here today to discuss the result.     5/30/24: Manoj is here for follow up visit. She has h/o bilateral invasive lobular carcinoma of the breast, ER/TX positive and HER2/deana negative, status post bilateral mastectomy, bilateral sentinel lymph node biopsy, and right axillary lymph node dissection in 1/2013, status post adjuvant chemotherapy and adjuvant chest wall radiotherapy, Completed 10-year of endocrine therapy with Letrozole in 7/2023. She had CT AP in 2/2024 for abdominal pain which incidentally showed a lesion in T6. She had MRI spine on 3/11/24 which showed a 2.7 cm lesion within the left aspect of the T6 vertebral body and 7 mm marrow replacing lesion within the T11 vertebral body.  PET/CT on 3/28/24 showed Intense pathologic FDG uptake (SUV 9.3) coregistering with sclerotic lesion on the left side of T6 suspicious for biologic tumor activity.  She had CT guided biopsy on 4/17/24 which showed metastatic carcinoma consistent from breast primary. She saw Dr. Anaya and received palliative RT 2000cGy to T6 lesion and completed RT on 5/22/24.  6/27/24: Manoj is here for follow up visit. She has h/o bilateral invasive lobular carcinoma of the breast, ER/TX positive and HER2/deana negative, status post bilateral mastectomy, bilateral sentinel lymph node biopsy, and right axillary lymph node dissection in 1/2013, status post adjuvant chemotherapy and adjuvant chest wall radiotherapy, Completed 10-year of endocrine therapy with Letrozole in 7/2023. She had CT AP in 2/2024 for abdominal pain which incidentally showed a lesion in T6. She had MRI spine on 3/11/24 which showed a 2.7 cm lesion within the left aspect of the T6 vertebral body and 7 mm marrow replacing lesion within the T11 vertebral body.  PET/CT on 3/28/24 showed Intense pathologic FDG uptake (SUV 9.3) coregistering with sclerotic lesion on the left side of T6 suspicious for biologic tumor activity.  She had CT guided biopsy on 4/17/24 which showed metastatic carcinoma consistent from breast primary. She saw Dr. Anaya and received palliative RT 2000cGy to T6 lesion and completed RT on 5/22/24. She started on Xeloda and had 1st week treatment. She had loose stool but otherwise tolerated well.   08/01/24: bilateral sentinel lymph node biopsy, and right axillary lymph node dissection in 1/2013, status post adjuvant chemotherapy and adjuvant chest wall radiotherapy, Completed 10-year of endocrine therapy with Letrozole in 7/2023. She had CT AP in 2/2024 for abdominal pain which incidentally showed a lesion in T6. She had MRI spine on 3/11/24 which showed a 2.7 cm lesion within the left aspect of the T6 vertebral body and 7 mm marrow replacing lesion within the T11 vertebral body.  PET/CT on 3/28/24 showed Intense pathologic FDG uptake (SUV 9.3) coregistering with sclerotic lesion on the left side of T6 suspicious for biologic tumor activity.  She had CT guided biopsy on 4/17/24 which showed metastatic carcinoma consistent from breast primary. She saw Dr. Anaya and received palliative RT 2000cGy to T6 lesion and completed RT on 5/22/24. She started on Xeloda and had 1st week of June 2024. She is complaining of persistence diarrhea despite Imodium intake, currently on Xeloda 3tabs every 12 hrs for one week on and one week off.   8/29/24: Manoj is here for follow up visit. She has h/o bilateral invasive lobular carcinoma of the breast, ER/TX positive and HER2/deana negative, status post bilateral mastectomy, bilateral sentinel lymph node biopsy, and right axillary lymph node dissection in 1/2013, status post adjuvant chemotherapy and adjuvant chest wall radiotherapy, Completed 10-year of endocrine therapy with Letrozole in 7/2023. She had CT AP in 2/2024 for abdominal pain which incidentally showed a lesion in T6. She had MRI spine on 3/11/24 which showed a 2.7 cm lesion within the left aspect of the T6 vertebral body and 7 mm marrow replacing lesion within the T11 vertebral body.  PET/CT on 3/28/24 showed Intense pathologic FDG uptake (SUV 9.3) coregistering with sclerotic lesion on the left side of T6 suspicious for biologic tumor activity.  She had CT guided biopsy on 4/17/24 which showed metastatic carcinoma consistent from breast primary. She saw Dr. Anaya and received palliative RT 2000cGy to T6 lesion and completed RT on 5/22/24. She has been on Xeloda and did not tolerate well. She complains diarrhea, weight loss and eye irritation.   10/10/24 Manoj is here for follow up visit. She has h/o bilateral invasive lobular carcinoma of the breast, ER/TX positive and HER2/deana negative, status post bilateral mastectomy, bilateral sentinel lymph node biopsy, and right axillary lymph node dissection in 1/2013, status post adjuvant chemotherapy and adjuvant chest wall radiotherapy, Completed 10-year of endocrine therapy with Letrozole in 7/2023. She had CT AP in 2/2024 for abdominal pain which incidentally showed a lesion in T6. She had MRI spine on 3/11/24 which showed a 2.7 cm lesion within the left aspect of the T6 vertebral body and 7 mm marrow replacing lesion within the T11 vertebral body.  PET/CT on 3/28/24 showed Intense pathologic FDG uptake (SUV 9.3) coregistering with sclerotic lesion on the left side of T6 suspicious for biologic tumor activity.  She had CT guided biopsy on 4/17/24 which showed metastatic carcinoma consistent from breast primary. She saw Dr. Anaya and received palliative RT 2000cGy to T6 lesion and completed RT on 5/22/24. She took Xeloda for 3 months and did not tolerate well. Xeloda was discontinued. She had a PET scan in 9.24 which showed interval resolution of uptake in T6 and New uptake in L1 and new pleural effussion. She completed XRT 5 fractions in 9.24.  She is here for follow up.  11/11/24 Manoj is here for follow up visit. She has h/o bilateral invasive lobular carcinoma of the breast, ER/TX positive and HER2/deana negative, status post bilateral mastectomy, bilateral sentinel lymph node biopsy, and right axillary lymph node dissection in 1/2013, status post adjuvant chemotherapy and adjuvant chest wall radiotherapy, Completed 10-year of endocrine therapy with Letrozole in 7/2023. She developed recurrent disease with a lesion in T6 in 2/2024. CT guided biopsy on 4/17/24 showed metastatic carcinoma consistent from breast primary. She saw Dr. Anaya and received palliative RT 2000cGy to T6 lesion and completed RT on 5/22/24. She took Xeloda for 3 months and did not tolerate well. Xeloda was discontinued. She had repeat PET/CT in 9.24 which showed interval resolution of uptake in T6 but new uptake in L1 and new pleural effusion. She completed XRT 5 fractions. She complains low back pain.   12/30/24 Manoj is here for follow up visit. Her spouse is with her. She has h/o bilateral invasive lobular carcinoma of the breast, ER/TX positive and HER2/deana negative, s/p bilateral mastectomy, bilateral sentinel lymph node biopsy, and right axillary lymph node dissection in 1/2013, s/p adjuvant chemotherapy and adjuvant chest wall radiotherapy, Completed 10-year of endocrine therapy with Letrozole in 7/2023. She developed recurrent disease with a lesion in T6 in 2/2024. CT guided biopsy on 4/17/24 showed metastatic carcinoma consistent from breast primary. She saw Dr. Anaya and received palliative RT 2000cGy to T6 lesion and completed RT on 5/22/24. She took Xeloda for 3 months and did not tolerate well. Xeloda was discontinued. She had repeat PET/CT in 9/16/24 which showed interval resolution of uptake in T6 but new uptake in L1 and new pleural effusion. MR Lumbar spine on 9/25/24 showed Infiltrative enhancing bone marrow signal abnormality within the T11 and L1 vertebral bodies most compatible with osseous metastatic disease. Mild compression deformity of L1, likely pathologic. Multilevel degenerative changes as described. She completed XRT 5 fractions on 10/1/24 -10/7/24. She has a history of fall on 9/2024. CT chest 12/12/24 showed no pleural effusion. She complains low back pain, taking Aleeve as needed with relief.  1/14/25: Manoj is here for follow up visit. Her spouse is with her. She has h/o bilateral invasive lobular carcinoma of the breast, ER/TX positive and HER2/deana negative, s/p bilateral mastectomy, bilateral sentinel lymph node biopsy, and right axillary lymph node dissection in 1/2013, s/p adjuvant chemotherapy and adjuvant chest wall radiotherapy, Completed 10-year of endocrine therapy with Letrozole in 7/2023. She developed recurrent disease with a lesion in T6 in 2/2024. CT guided biopsy on 4/17/24 showed metastatic carcinoma consistent from breast primary. She saw Dr. Anaya and received palliative RT 2000cGy to T6 lesion and completed RT on 5/22/24. She took Xeloda for 3 months and did not tolerate. Xeloda was discontinued. She had repeat PET/CT in 9/16/24 which showed interval resolution of uptake in T6 but new uptake in L1 and new pleural effusion.  She completed XRT 5 fractions on 10/1/24 -10/7/24. She has been taking Letrozole. She feels some aches and pains.   2/20/25 Manoj is here for follow up visit. Her spouse is with her. She has h/o bilateral invasive lobular carcinoma of the breast, ER/TX positive and HER2/deana negative, s/p bilateral mastectomy, bilateral sentinel lymph node biopsy, and right axillary lymph node dissection in 1/2013, s/p adjuvant chemotherapy and adjuvant chest wall radiotherapy, Completed 10-year of endocrine therapy with Letrozole in 7/2023. She developed recurrent disease with a lesion in T6 in 2/2024. CT guided biopsy on 4/17/24 showed metastatic carcinoma consistent from breast primary. She saw Dr. Anaya and received palliative RT 2000cGy to T6 lesion and completed RT on 5/22/24. She took Xeloda for 3 months and did not tolerate. Xeloda was discontinued. She had repeat PET/CT in 9/16/24 which showed interval resolution of uptake in T6 but new uptake in L1 and new pleural effusion. She completed XRT 5 fractions on 10/1/24 -10/7/24. She has been taking Letrozole. PET/CT on 1/7/25 showed several new foci of pathologic FDG uptake in bones, suspicious for biologic tumor activity. Interval resolution of previously noted small right pleural effusion. She feels some aches and pains. She started weekly Taxol, 2 weeks on and 1 week off on 1/31/25, s/p cycle #1 and is tolerating well except for diarrhea yesterday, managed with Imodium. She denies nausea, vomiting, or numbness or tingling of hands/feet. She saw Dr Taylor and is planned for L1 kyphoplasty on 3/18/25.  3/13/25 Manoj is here for follow up visit. Her spouse is with her. She has h/o bilateral invasive lobular carcinoma of the breast, ER/TX positive and HER2/deana negative, s/p bilateral mastectomy, bilateral sentinel lymph node biopsy, and right axillary lymph node dissection in 1/2013, s/p adjuvant chemotherapy and adjuvant chest wall radiotherapy, Completed 10-year of endocrine therapy with Letrozole in 7/2023. She developed recurrent disease with a lesion in T6 in 2/2024. CT guided biopsy on 4/17/24 showed metastatic carcinoma consistent from breast primary. She saw Dr. Anaya and received palliative RT 2000cGy to T6 lesion and completed RT on 5/22/24. She took Xeloda for 3 months and did not tolerate. Xeloda was discontinued. She had repeat PET/CT in 9/16/24 which showed interval resolution of uptake in T6 but new uptake in L1 and new pleural effusion. She completed XRT 5 fractions on 10/1/24 -10/7/24. She has been taking Letrozole. PET/CT on 1/7/25 showed several new foci of pathologic FDG uptake in bones, suspicious for biologic tumor activity. She started weekly Taxol, 2 weeks on and 1 week off on 1/31/25, s/p 2 cycles and tolerated.    4/3/25 Manoj is here for follow up visit. Her spouse is with her. She has h/o bilateral invasive lobular carcinoma of the breast, ER/TX positive and HER2/deana negative, s/p bilateral mastectomy, bilateral sentinel lymph node biopsy, and right axillary lymph node dissection in 1/2013, s/p adjuvant chemotherapy and adjuvant chest wall radiotherapy, Completed 10-year of endocrine therapy with Letrozole in 7/2023. She developed recurrent disease with a lesion in T6 in 2/2024. CT guided biopsy on 4/17/24 showed metastatic carcinoma consistent from breast primary. She saw Dr. Anaya and received palliative RT 2000cGy to T6 lesion and completed RT on 5/22/24. She took Xeloda for 3 months and did not tolerate. Xeloda was discontinued. She had repeat PET/CT in 9/16/24 which showed interval resolution of uptake in T6 but new uptake in L1 and new pleural effusion. She completed XRT 5 fractions on 10/1/24 -10/7/24. She was taking Letrozole. PET/CT on 1/7/25 showed several new foci of pathologic FDG uptake in bones, suspicious for biologic tumor activity. She started weekly Taxol, 2 weeks on and 1 week off on 1/31/25, s/p 3 cycles and tolerated. She feels some numbness and tingling in her hands and feet. She does not have n/v/d, mouth sore, chill or fever.    4/16/25 Manoj is here for follow up visit. Her spouse is with her. She has h/o bilateral invasive lobular carcinoma of the breast, ER/TX positive and HER2/deana negative, s/p bilateral mastectomy, bilateral sentinel lymph node biopsy, and right axillary lymph node dissection in 1/2013, s/p adjuvant chemotherapy and adjuvant chest wall radiotherapy, Completed 10-year of endocrine therapy with Letrozole in 7/2023. She developed recurrent disease with a lesion in T6 in 2/2024. CT guided biopsy on 4/17/24 showed metastatic carcinoma consistent from breast primary. She saw Dr. Anaya and received palliative RT 2000cGy to T6 lesion and completed RT on 5/22/24. She took Xeloda for 3 months and did not tolerate. Xeloda was discontinued. She had repeat PET/CT in 9/16/24 which showed interval resolution of uptake in T6 but new uptake in L1 and new pleural effusion. She completed XRT 5 fractions on 10/1/24 -10/7/24. She was taking Letrozole. PET/CT on 1/7/25 showed several new foci of pathologic FDG uptake in bones, suspicious for biologic tumor activity. She started weekly Taxol, 2 weeks on and 1 week off on 1/31/25, s/p 3 cycles and tolerated. She feels some numbness and tingling in her hands and feet. She does not have n/v/d, mouth sore, chill or fever.    4/30/25 Manoj is here for follow up visit and chemotherapy. Her  is with her. She has h/o bilateral invasive lobular carcinoma of the breast, ER/TX positive and HER2/deana negative, s/p bilateral mastectomy, bilateral sentinel lymph node biopsy, and right axillary lymph node dissection in 1/2013, s/p adjuvant chemotherapy and adjuvant chest wall radiotherapy, Completed 10-year of endocrine therapy with Letrozole in 7/2023. She developed recurrent disease with a lesion in T6 in 2/2024. CT guided biopsy on 4/17/24 showed metastatic carcinoma consistent from breast primary. She received palliative RT 2000cGy to T6 lesion and completed RT on 5/22/24. She took Xeloda for 3 months and did not tolerate. Xeloda was discontinued. She had repeat PET/CT in 9/16/24 which showed interval resolution of uptake in T6 but new uptake in L1 and new pleural effusion. She completed XRT 5 fractions on 10/1/24 -10/7/24. She was taking Letrozole. PET/CT on 1/7/25 showed several new foci of pathologic FDG uptake in bones, suspicious for biologic tumor activity. She started weekly Taxol, 2 weeks on and 1 week off on 1/31/25, s/p 4 cycles and tolerated. She has mild neuropathy symptoms. She does not have n/v/d, mouth sore, chill or fever.    5/21/25 Manoj is here for follow up visit and chemotherapy. Her  is with her. She has h/o bilateral invasive lobular carcinoma of the breast, ER/TX positive and HER2/deana negative, s/p bilateral mastectomy, bilateral sentinel lymph node biopsy, and right axillary lymph node dissection in 1/2013, s/p adjuvant chemotherapy and adjuvant chest wall radiotherapy, Completed 10-year of endocrine therapy with Letrozole in 7/2023. She developed recurrent disease with a lesion in T6 in 2/2024. CT guided biopsy on 4/17/24 showed metastatic carcinoma consistent from breast primary, triple negative. She received palliative RT 2000cGy to T6 lesion and completed RT on 5/22/24. She took Xeloda for 3 months and did not tolerate. Xeloda was discontinued. She had repeat PET/CT in 9/16/24 which showed interval resolution of uptake in T6 but new uptake in L1 and new pleural effusion. She completed XRT 5 fractions on 10/1/24 -10/7/24. She was taking Letrozole. PET/CT on 1/7/25 showed several new foci of pathologic FDG uptake in bones, suspicious for biologic tumor activity. She started weekly Taxol, 2 weeks on and 1 week off on 1/31/25, s/p 5 cycles and is tolerating. She feels neuropathy which is getting worse on the right foot. She had explosive diarrhea last Sunday which resolved after taking Imodium. She gets intermittent back pain, managed with Aleeve. She denies nausea, vomiting, fever or chills.  6/11/25 Manoj is here for follow up visit and chemotherapy. Her  is with her. She has h/o bilateral invasive lobular carcinoma of the breast, ER/TX positive and HER2/deana negative, s/p bilateral mastectomy, bilateral sentinel lymph node biopsy, and right axillary lymph node dissection in 1/2013, s/p adjuvant chemotherapy and adjuvant chest wall radiotherapy, Completed 10-year of endocrine therapy with Letrozole in 7/2023. She developed recurrent disease with a lesion in T6 in 2/2024. CT guided biopsy on 4/17/24 showed metastatic carcinoma consistent from breast primary, triple negative. She received palliative RT 2000cGy to T6 lesion and completed RT on 5/22/24. She took Xeloda for 3 months and did not tolerate. Xeloda was discontinued. She had repeat PET/CT in 9/16/24 which showed interval resolution of uptake in T6 but new uptake in L1 and new pleural effusion. She completed XRT 5 fractions on 10/1/24 -10/7/24. She was taking Letrozole. PET/CT on 1/7/25 showed several new foci of pathologic FDG uptake in bones, suspicious for biologic tumor activity. She started weekly Taxol, 2 weeks on and 1 week off on 1/31/25, s/p 6 cycles and is tolerating. She feels neuropathy which is getting worse on the right foot. She had explosive diarrhea last Sunday which resolved after taking Imodium. She gets intermittent back pain, managed with Aleeve. She denies nausea, vomiting, fever or chills.

## 2025-07-02 NOTE — PHYSICAL EXAM
[Fully active, able to carry on all pre-disease performance without restriction] : Status 0 - Fully active, able to carry on all pre-disease performance without restriction [Normal] : full range of motion and no deformities appreciated [de-identified] : Right chestchemoport, intact and no erythema noted. [de-identified] :  Status post bilateral mastectomy and autologous tissue reconstruction. There is no skin lesion and no palpable axillary lymphadenopathy. [de-identified] : Lower abdominal fat necrosis. Abdominal scar present.  [de-identified] : Low back pain 2' metastatic disease.

## 2025-07-02 NOTE — ASSESSMENT
[FreeTextEntry1] : Assessment and Plan: # H/O Bilateral invasive lobular carcinoma of the breast, ER/OH positive and HER2/deana negative, status post bilateral mastectomy, bilateral sentinel lymph node biopsy, and right axillary lymph node dissection, status post adjuvant chemotherapy and adjuvant chest wall radiotherapy, Completed 10-year of endocrine therapy with Letrozole in 7/2023.  - S/P b/l mastectomy. There is no palpable abnormality.  # Recurrent breast cancer(ER/OH/Her 2 negative Biopsy proven) with T6 vertebral body lesion S/P XRT(5/24) now presenting with new L1 lesion S/P XRT  9.24 - MRI spine on 3/11/24 showed a 2.7 cm lesion within the left aspect of the T6 vertebral body and 7 mm marrow replacing lesion within the T11 vertebral body.  - PET/CT on 3/28/24 showed Intense pathologic FDG uptake (SUV 9.3) coregistering with sclerotic lesion on the left side of T6 suspicious for biologic tumor activity. No other sites of abnormal FDG uptake.  - CT guided core bx of the lesion in T6 vertebral body on 4/17/24 showed metastatic carcinoma, favor breast primary, ER/OH negative. Her2 is negative.  - S/P palliative RT 2000cGy to T6 lesion and completed RT on 5/22/24. - She took Xeloda for 3 months, 1500 mg twice a day, one week on and one week off. Due to frequent diarrhea, dose reduced to Xeloda 1000 mg q12h, 1 week on and 1 week off.  She developed multiple side effects and Xeloda was discontinued.  -- PET/CT in 9/16/24 showed interval resolution of uptake in T6 and New uptake in L1 and new pleural effusion.  -- MRI of lumbar spine 9/25/24 showed infiltrative enhancing bone marrow signal abnormality within the T11 and L1 vertebral bodies most compatible with osseous metastatic disease. Mild compression deformity of L1, likely pathologic. -- She received XRT 5 fractions 2000cGy to Vertebrae, Lumbar on 10/1/24 - 10/7/24. -- Recurrent breast cancer in the spine is triple negative. Since her initial breast cancer was hormone receptor and bone met biopsy could have inadequate IHC staining for hormone receptor, she was given Letrozole.  -- Repeat PET/CT on 1/7/25 showed several new foci of pathologic FDG uptake in bones, suspicious for biologic tumor activity. Interval resolution of previously noted small right pleural effusion. -- In light of progression of disease, she switched to Taxol 80 mg weekly, 2 weeks on and one week off. S/P 7 cycles. Repeat PET/CT on 4/23/25 showed stable bony mets with SUV values trending down in some areas and trending up slightly in the other areas. There is no visceral mets.  -- Will proceed with 8th cycle of weekly Taxol, 2 weeks on and 1 week off. CBC today reviewed and is adequate. Mild leucopenia with normal ANC and mild anemia noted, will monitor. -- Will repeat PET scan to eval treatment response, script given. -- Will order liquid NGS and PD-L1 to look for additional treatment options.  -- Continue X-Geva injection monthly. Dental clearance was done.  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 Emphasized to take Gabapentin.  -- Mild elevated AST and ALT. Resolved. Will continue monitoring.  -- Order blood work: CBC, BMP, LFT, Mg, CEA and .  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 She saw neurosurgeon.   #Low back pain.  -- S/P L1 kyphoplasty on 4/11/25. Pain has reduced.  -- NSAIDs as needed.  -- F/U with Neurosurgery, Dr Taylor as scheduled.  RTO in 3 weeks. Case was seen and discussed with Dr. Tovar who agreed with assessment and plan.

## 2025-07-02 NOTE — HISTORY OF PRESENT ILLNESS
[de-identified] : This 68-year-old female is here today for a followup visit.  She has a history of bilateral invasive lobular carcinoma, stage IIIA (pT1c N2a M0) in the right breast, and stage IA (pT1b N0 M0) in the left breast.  Both sided breast cancer were ER/GA positive and HER2/deana negative.  She had bilateral mastectomy, bilateral sentinel lymph node biopsy, and right axillary lymph node dissection at [facility] on 01/08/2013.  She received adjuvant chemotherapy with Adriamycin and Cytoxan followed by paclitaxel.  She also received post mastectomy adjuvant radiotherapy to right side chest wall.  She has been on adjuvant endocrine therapy with Femara since 7/2013  and has been tolerating well.  She had genetic test for BRCA mutations.  She is negative for BRCA1/2 mutations.  In 10/2014, she had a bone density at [facility], which showed osteopenia. Her latest bone density was done 7/21/16 which showed osteopenia. MRI of breasts was done on 1/30/17 which showed no MR evidence of malignancy in either breast. Recommendation: Unless otherwise indicated by clinical findings, annual screening mammography recommended. This can be alternated at 6 month intervals with bilateral screening breast MRI.\par   She has been taking calcium and vitamin D supplements. Latest colonoscopy 11/2016 pt states it was negative. Saw Dr. Garcia 1/2017.\par  She saw Dr. Rankin recently. She is scheduled to have a revision of her lower abdominal fat necrosis on 1/2018.\par  \par   [de-identified] : 6/8/18: She stopped letrozole few months ago as she was found to be in Afib by her cardiologist on Pre-op clearance for abdominal fat necrosis. However her cardiologist () told her that you can resume Letrozole. She had surgery for abdominal fat necrosis and she is going for surgery for incisional hernia by .   12/13/18: The patient is here for followup visit. She has been taking Letrozole daily (since 7/2013) and tolerating well. She had hernia repair surgery. She complains some pain in her knees. She had MRI breast in 1 2017. There was no suspicious finding.  6/14/19; The patient is here for followup visit. She has been taking Letrozole daily (since 7/2013) and tolerating well. She complains some pain in her knees. She does not have other new complains. She has mild elevated calcium. PHT was normal.  12/13/2019 :  The patient is here for follow up. She has been on letrozole daily and tolerating well. She had a bone density 12/2/109 which showed osteopenia of femur and hip. She is not taking calcium and vit D since her last calcium was elevated 10.7. She had a recent US breasts 12/2019 for breast pain which was negative. She was seen by her cardiologist and had blood work in the summer. She was told that she has severe anemia. She started her on iron pills. She denies any vaginal bleeding or rectal bleeding  6/12/2020: The patient is here for follow up. She had bilateral invasive lobular carcinoma of the breast, ER/OK positive and HER2/deana negative, status post bilateral mastectomy, bilateral sentinel lymph node biopsy, and right axillary lymph node dissection in 1/2013, status post adjuvant chemotherapy and adjuvant chest wall radiotherapy, currently on adjuvant endocrine therapy with letrozole and tolerating well. She had a bone density 12/2/19 which showed osteopenia of femur and hip. She is not taking calcium and vit D since her last calcium was elevated 10.7. She had a recent US breasts 12/2019 for breast pain which was negative. She does not have new complains.  12/02/2020: MANOJ is here for follow up visit for bilateral invasive lobular carcinoma of the breast, ER/OK positive and HER2/deana negative, status post bilateral mastectomy, bilateral sentinel lymph node biopsy, and right axillary lymph node dissection in 1/2013, status post adjuvant chemotherapy and adjuvant chest wall radiotherapy, currently on adjuvant endocrine therapy with letrozole and tolerating well besides mild arthralgia. She had a bone density 12/2/19 which showed osteopenia of femur and hip. She continues on Vitamin D daily. She denies any new breast symptoms at this time. In addition, patient was diagnosed with COVID in 3/2020.   06/02/2021: MANOJ is here for follow up visit for bilateral invasive lobular carcinoma of the breast, ER/OK positive and HER2/deana negative, status post bilateral mastectomy, bilateral sentinel lymph node biopsy, and right axillary lymph node dissection in 1/2013, status post adjuvant chemotherapy and adjuvant chest wall radiotherapy, currently on adjuvant endocrine therapy with letrozole since 7/2013 and tolerating well besides mild arthralgia. She had a bone density 12/2/19 which showed osteopenia of femur and hip. She continues on Vitamin D daily. She denies any new breast symptoms at this time. she had right breast skin tag biopsied by dermatologist; which was benign.   1/5/22 MANOJ is here for follow up visit for bilateral invasive lobular carcinoma of the breast, ER/OK positive and HER2/deana negative, status post bilateral mastectomy, bilateral sentinel lymph node biopsy, and right axillary lymph node dissection in 1/2013, status post adjuvant chemotherapy and adjuvant chest wall radiotherapy, currently on adjuvant endocrine therapy with letrozole since 7/2013 and tolerating well besides mild arthralgia. She had a bone density 12/7/21 which showed worsening osteopenia of femur and hip, T score -2.4. She is not complaint with Vitamin D, does not take calcium due to mildly elevated Calcium. She denies any new breast symptoms at this time. She was experiencing lower abdominal and pelvic pain in Sept had US which was unremarkable. In addition, had Thyroid US due to hypercalcemia which was unremarkable, In 8/2021 TSH 2.36, Calcium 10, and PTH was 78.   7/21/22: Manoj is here for follow up visit for bilateral invasive lobular carcinoma of the breast, ER/OK positive and HER2/deana negative, status post bilateral mastectomy, bilateral sentinel lymph node biopsy, and right axillary lymph node dissection in 1/2013, status post adjuvant chemotherapy and adjuvant chest wall radiotherapy, currently on adjuvant endocrine therapy with letrozole since 7/2013 and tolerating well besides mild arthralgia. She had a bone density 12/7/21 which showed worsening osteopenia of femur and hip, T score -2.4. She takes Vitamin D but not calcium because her calcium has been mildly elevated. She was found to have large hiatal hernia and she has had on and off diarrhea for long time. She has been seen by Dr. Borjas.   01/26/2023 Manoj is here for follow up visit for bilateral invasive lobular carcinoma of the breast, ER/OK positive and HER2/deana negative, status post bilateral mastectomy, bilateral sentinel lymph node biopsy, and right axillary lymph node dissection in 1/2013, status post adjuvant chemotherapy and adjuvant chest wall radiotherapy, currently on adjuvant endocrine therapy with letrozole since 7/2013 and tolerating well besides mild arthralgia. She had a bone density 12/7/21 which showed worsening osteopenia of femur and hip, T score -2.4. She takes Vitamin D but not calcium because her calcium has been mildly elevated. She was found to have large hiatal hernia and surgery on 11/21/22. Her Hb drooped to 9.5 g/dl after surgery. She was found to have high serum calcium but repeat calcium was normal. PTH was also within normal limit.  7/20/23 Manoj is here for follow up visit for bilateral invasive lobular carcinoma of the breast, ER/OK positive and HER2/deana negative, status post bilateral mastectomy, bilateral sentinel lymph node biopsy, and right axillary lymph node dissection in 1/2013, status post adjuvant chemotherapy and adjuvant chest wall radiotherapy, currently on adjuvant endocrine therapy with letrozole since 7/2013 and tolerating well besides mild arthralgia. She had a bone density 12/7/21 which showed worsening osteopenia of femur and hip, T score -2.4. She takes Vitamin D but not calcium because her calcium has been mildly elevated. Repeat serum calcium normal, MM panel normal. She was found to have large hiatal hernia and surgery on 11/21/22. Her Hb drooped to 9.5 g/dl after surgery, now normal. After hernia repair she was having symptoms consistent with vagal nerve mediated gastroparesis, she then had endoscopic pyloromyotomy, symptoms resolved.   1/25/2024: Manoj is here for follow up visit for bilateral invasive lobular carcinoma of the breast, ER/OK positive and HER2/deana negative, status post bilateral mastectomy, bilateral sentinel lymph node biopsy, and right axillary lymph node dissection in 1/2013, status post adjuvant chemotherapy and adjuvant chest wall radiotherapy, currently on adjuvant endocrine therapy with letrozole since 7/2013 and tolerating well besides mild arthralgia. She Completed 10 years treatment of Letrozole in 7/2023. She had a bone density 12/8/23 Showed:         ----------------------------------------------------------------- AP Spine (L1-L4)         0.998   -0.4      1.8     Normal Femoral Neck (Left) 0.552   -2.7     -0.7     Osteoporosis Total Hip (Left) 0.676   -2.2     -0.6     Osteopenia -which showed Osteoporosis of femur Neck and Osteopenia in the Hip. She takes Vitamin D.  After hernia repair, she was having symptoms consistent with vagal nerve mediated gastroparesis, she then had endoscopic pyloromyotomy, symptoms resolved.  She starts feeling better now.   3/29/24: Manoj is here for follow up visit. She has h/o bilateral invasive lobular carcinoma of the breast, ER/OK positive and HER2/deana negative, status post bilateral mastectomy, bilateral sentinel lymph node biopsy, and right axillary lymph node dissection in 1/2013, status post adjuvant chemotherapy and adjuvant chest wall radiotherapy, currently on adjuvant endocrine therapy with letrozole since 7/2013. She had CT AP in 2/2024 for abdominal pain which incidentally showed a lesion in T6. She had MRI spine on 3/11/24 which showed a 2.7 cm lesion within the left aspect of the T6 vertebral body and 7 mm marrow replacing lesion within the T11 vertebral body.  PET/CT on 3/28/24 showed Intense pathologic FDG uptake (SUV 9.3) coregistering with sclerotic lesion on the left side of T6 suspicious for biologic tumor activity. No other sites of abnormal FDG uptake. She is here today to discuss further management plan.  4/29/24: Manoj is here for follow up visit. She has h/o bilateral invasive lobular carcinoma of the breast, ER/OK positive and HER2/deana negative, status post bilateral mastectomy, bilateral sentinel lymph node biopsy, and right axillary lymph node dissection in 1/2013, status post adjuvant chemotherapy and adjuvant chest wall radiotherapy, Completed 10-year of endocrine therapy with Letrozole in 7/2023. She had CT AP in 2/2024 for abdominal pain which incidentally showed a lesion in T6. She had MRI spine on 3/11/24 which showed a 2.7 cm lesion within the left aspect of the T6 vertebral body and 7 mm marrow replacing lesion within the T11 vertebral body.  PET/CT on 3/28/24 showed Intense pathologic FDG uptake (SUV 9.3) coregistering with sclerotic lesion on the left side of T6 suspicious for biologic tumor activity. No other sites of abnormal FDG uptake. She had CT guided biopsy on 4/17/24 and is here today to discuss the result.     5/30/24: Manoj is here for follow up visit. She has h/o bilateral invasive lobular carcinoma of the breast, ER/OK positive and HER2/deana negative, status post bilateral mastectomy, bilateral sentinel lymph node biopsy, and right axillary lymph node dissection in 1/2013, status post adjuvant chemotherapy and adjuvant chest wall radiotherapy, Completed 10-year of endocrine therapy with Letrozole in 7/2023. She had CT AP in 2/2024 for abdominal pain which incidentally showed a lesion in T6. She had MRI spine on 3/11/24 which showed a 2.7 cm lesion within the left aspect of the T6 vertebral body and 7 mm marrow replacing lesion within the T11 vertebral body.  PET/CT on 3/28/24 showed Intense pathologic FDG uptake (SUV 9.3) coregistering with sclerotic lesion on the left side of T6 suspicious for biologic tumor activity.  She had CT guided biopsy on 4/17/24 which showed metastatic carcinoma consistent from breast primary. She saw Dr. Anaya and received palliative RT 2000cGy to T6 lesion and completed RT on 5/22/24.  6/27/24: Manoj is here for follow up visit. She has h/o bilateral invasive lobular carcinoma of the breast, ER/OK positive and HER2/deana negative, status post bilateral mastectomy, bilateral sentinel lymph node biopsy, and right axillary lymph node dissection in 1/2013, status post adjuvant chemotherapy and adjuvant chest wall radiotherapy, Completed 10-year of endocrine therapy with Letrozole in 7/2023. She had CT AP in 2/2024 for abdominal pain which incidentally showed a lesion in T6. She had MRI spine on 3/11/24 which showed a 2.7 cm lesion within the left aspect of the T6 vertebral body and 7 mm marrow replacing lesion within the T11 vertebral body.  PET/CT on 3/28/24 showed Intense pathologic FDG uptake (SUV 9.3) coregistering with sclerotic lesion on the left side of T6 suspicious for biologic tumor activity.  She had CT guided biopsy on 4/17/24 which showed metastatic carcinoma consistent from breast primary. She saw Dr. Anaya and received palliative RT 2000cGy to T6 lesion and completed RT on 5/22/24. She started on Xeloda and had 1st week treatment. She had loose stool but otherwise tolerated well.   08/01/24: bilateral sentinel lymph node biopsy, and right axillary lymph node dissection in 1/2013, status post adjuvant chemotherapy and adjuvant chest wall radiotherapy, Completed 10-year of endocrine therapy with Letrozole in 7/2023. She had CT AP in 2/2024 for abdominal pain which incidentally showed a lesion in T6. She had MRI spine on 3/11/24 which showed a 2.7 cm lesion within the left aspect of the T6 vertebral body and 7 mm marrow replacing lesion within the T11 vertebral body.  PET/CT on 3/28/24 showed Intense pathologic FDG uptake (SUV 9.3) coregistering with sclerotic lesion on the left side of T6 suspicious for biologic tumor activity.  She had CT guided biopsy on 4/17/24 which showed metastatic carcinoma consistent from breast primary. She saw Dr. Anaya and received palliative RT 2000cGy to T6 lesion and completed RT on 5/22/24. She started on Xeloda and had 1st week of June 2024. She is complaining of persistence diarrhea despite Imodium intake, currently on Xeloda 3tabs every 12 hrs for one week on and one week off.   8/29/24: Manoj is here for follow up visit. She has h/o bilateral invasive lobular carcinoma of the breast, ER/OK positive and HER2/deana negative, status post bilateral mastectomy, bilateral sentinel lymph node biopsy, and right axillary lymph node dissection in 1/2013, status post adjuvant chemotherapy and adjuvant chest wall radiotherapy, Completed 10-year of endocrine therapy with Letrozole in 7/2023. She had CT AP in 2/2024 for abdominal pain which incidentally showed a lesion in T6. She had MRI spine on 3/11/24 which showed a 2.7 cm lesion within the left aspect of the T6 vertebral body and 7 mm marrow replacing lesion within the T11 vertebral body.  PET/CT on 3/28/24 showed Intense pathologic FDG uptake (SUV 9.3) coregistering with sclerotic lesion on the left side of T6 suspicious for biologic tumor activity.  She had CT guided biopsy on 4/17/24 which showed metastatic carcinoma consistent from breast primary. She saw Dr. Anaya and received palliative RT 2000cGy to T6 lesion and completed RT on 5/22/24. She has been on Xeloda and did not tolerate well. She complains diarrhea, weight loss and eye irritation.   10/10/24 Manoj is here for follow up visit. She has h/o bilateral invasive lobular carcinoma of the breast, ER/OK positive and HER2/deana negative, status post bilateral mastectomy, bilateral sentinel lymph node biopsy, and right axillary lymph node dissection in 1/2013, status post adjuvant chemotherapy and adjuvant chest wall radiotherapy, Completed 10-year of endocrine therapy with Letrozole in 7/2023. She had CT AP in 2/2024 for abdominal pain which incidentally showed a lesion in T6. She had MRI spine on 3/11/24 which showed a 2.7 cm lesion within the left aspect of the T6 vertebral body and 7 mm marrow replacing lesion within the T11 vertebral body.  PET/CT on 3/28/24 showed Intense pathologic FDG uptake (SUV 9.3) coregistering with sclerotic lesion on the left side of T6 suspicious for biologic tumor activity.  She had CT guided biopsy on 4/17/24 which showed metastatic carcinoma consistent from breast primary. She saw Dr. Anaya and received palliative RT 2000cGy to T6 lesion and completed RT on 5/22/24. She took Xeloda for 3 months and did not tolerate well. Xeloda was discontinued. She had a PET scan in 9.24 which showed interval resolution of uptake in T6 and New uptake in L1 and new pleural effussion. She completed XRT 5 fractions in 9.24.  She is here for follow up.  11/11/24 Manoj is here for follow up visit. She has h/o bilateral invasive lobular carcinoma of the breast, ER/OK positive and HER2/deana negative, status post bilateral mastectomy, bilateral sentinel lymph node biopsy, and right axillary lymph node dissection in 1/2013, status post adjuvant chemotherapy and adjuvant chest wall radiotherapy, Completed 10-year of endocrine therapy with Letrozole in 7/2023. She developed recurrent disease with a lesion in T6 in 2/2024. CT guided biopsy on 4/17/24 showed metastatic carcinoma consistent from breast primary. She saw Dr. Anaya and received palliative RT 2000cGy to T6 lesion and completed RT on 5/22/24. She took Xeloda for 3 months and did not tolerate well. Xeloda was discontinued. She had repeat PET/CT in 9.24 which showed interval resolution of uptake in T6 but new uptake in L1 and new pleural effusion. She completed XRT 5 fractions. She complains low back pain.   12/30/24 Manoj is here for follow up visit. Her spouse is with her. She has h/o bilateral invasive lobular carcinoma of the breast, ER/OK positive and HER2/deana negative, s/p bilateral mastectomy, bilateral sentinel lymph node biopsy, and right axillary lymph node dissection in 1/2013, s/p adjuvant chemotherapy and adjuvant chest wall radiotherapy, Completed 10-year of endocrine therapy with Letrozole in 7/2023. She developed recurrent disease with a lesion in T6 in 2/2024. CT guided biopsy on 4/17/24 showed metastatic carcinoma consistent from breast primary. She saw Dr. Anaya and received palliative RT 2000cGy to T6 lesion and completed RT on 5/22/24. She took Xeloda for 3 months and did not tolerate well. Xeloda was discontinued. She had repeat PET/CT in 9/16/24 which showed interval resolution of uptake in T6 but new uptake in L1 and new pleural effusion. MR Lumbar spine on 9/25/24 showed Infiltrative enhancing bone marrow signal abnormality within the T11 and L1 vertebral bodies most compatible with osseous metastatic disease. Mild compression deformity of L1, likely pathologic. Multilevel degenerative changes as described. She completed XRT 5 fractions on 10/1/24 -10/7/24. She has a history of fall on 9/2024. CT chest 12/12/24 showed no pleural effusion. She complains low back pain, taking Aleeve as needed with relief.  1/14/25: Manoj is here for follow up visit. Her spouse is with her. She has h/o bilateral invasive lobular carcinoma of the breast, ER/OK positive and HER2/deana negative, s/p bilateral mastectomy, bilateral sentinel lymph node biopsy, and right axillary lymph node dissection in 1/2013, s/p adjuvant chemotherapy and adjuvant chest wall radiotherapy, Completed 10-year of endocrine therapy with Letrozole in 7/2023. She developed recurrent disease with a lesion in T6 in 2/2024. CT guided biopsy on 4/17/24 showed metastatic carcinoma consistent from breast primary. She saw Dr. Anaya and received palliative RT 2000cGy to T6 lesion and completed RT on 5/22/24. She took Xeloda for 3 months and did not tolerate. Xeloda was discontinued. She had repeat PET/CT in 9/16/24 which showed interval resolution of uptake in T6 but new uptake in L1 and new pleural effusion.  She completed XRT 5 fractions on 10/1/24 -10/7/24. She has been taking Letrozole. She feels some aches and pains.   2/20/25 Manoj is here for follow up visit. Her spouse is with her. She has h/o bilateral invasive lobular carcinoma of the breast, ER/OK positive and HER2/deana negative, s/p bilateral mastectomy, bilateral sentinel lymph node biopsy, and right axillary lymph node dissection in 1/2013, s/p adjuvant chemotherapy and adjuvant chest wall radiotherapy, Completed 10-year of endocrine therapy with Letrozole in 7/2023. She developed recurrent disease with a lesion in T6 in 2/2024. CT guided biopsy on 4/17/24 showed metastatic carcinoma consistent from breast primary. She saw Dr. Anaya and received palliative RT 2000cGy to T6 lesion and completed RT on 5/22/24. She took Xeloda for 3 months and did not tolerate. Xeloda was discontinued. She had repeat PET/CT in 9/16/24 which showed interval resolution of uptake in T6 but new uptake in L1 and new pleural effusion. She completed XRT 5 fractions on 10/1/24 -10/7/24. She has been taking Letrozole. PET/CT on 1/7/25 showed several new foci of pathologic FDG uptake in bones, suspicious for biologic tumor activity. Interval resolution of previously noted small right pleural effusion. She feels some aches and pains. She started weekly Taxol, 2 weeks on and 1 week off on 1/31/25, s/p cycle #1 and is tolerating well except for diarrhea yesterday, managed with Imodium. She denies nausea, vomiting, or numbness or tingling of hands/feet. She saw Dr Taylor and is planned for L1 kyphoplasty on 3/18/25.  3/13/25 Manoj is here for follow up visit. Her spouse is with her. She has h/o bilateral invasive lobular carcinoma of the breast, ER/OK positive and HER2/deana negative, s/p bilateral mastectomy, bilateral sentinel lymph node biopsy, and right axillary lymph node dissection in 1/2013, s/p adjuvant chemotherapy and adjuvant chest wall radiotherapy, Completed 10-year of endocrine therapy with Letrozole in 7/2023. She developed recurrent disease with a lesion in T6 in 2/2024. CT guided biopsy on 4/17/24 showed metastatic carcinoma consistent from breast primary. She saw Dr. Anaya and received palliative RT 2000cGy to T6 lesion and completed RT on 5/22/24. She took Xeloda for 3 months and did not tolerate. Xeloda was discontinued. She had repeat PET/CT in 9/16/24 which showed interval resolution of uptake in T6 but new uptake in L1 and new pleural effusion. She completed XRT 5 fractions on 10/1/24 -10/7/24. She has been taking Letrozole. PET/CT on 1/7/25 showed several new foci of pathologic FDG uptake in bones, suspicious for biologic tumor activity. She started weekly Taxol, 2 weeks on and 1 week off on 1/31/25, s/p 2 cycles and tolerated.    4/3/25 Manoj is here for follow up visit. Her spouse is with her. She has h/o bilateral invasive lobular carcinoma of the breast, ER/OK positive and HER2/deana negative, s/p bilateral mastectomy, bilateral sentinel lymph node biopsy, and right axillary lymph node dissection in 1/2013, s/p adjuvant chemotherapy and adjuvant chest wall radiotherapy, Completed 10-year of endocrine therapy with Letrozole in 7/2023. She developed recurrent disease with a lesion in T6 in 2/2024. CT guided biopsy on 4/17/24 showed metastatic carcinoma consistent from breast primary. She saw Dr. Anaya and received palliative RT 2000cGy to T6 lesion and completed RT on 5/22/24. She took Xeloda for 3 months and did not tolerate. Xeloda was discontinued. She had repeat PET/CT in 9/16/24 which showed interval resolution of uptake in T6 but new uptake in L1 and new pleural effusion. She completed XRT 5 fractions on 10/1/24 -10/7/24. She was taking Letrozole. PET/CT on 1/7/25 showed several new foci of pathologic FDG uptake in bones, suspicious for biologic tumor activity. She started weekly Taxol, 2 weeks on and 1 week off on 1/31/25, s/p 3 cycles and tolerated. She feels some numbness and tingling in her hands and feet. She does not have n/v/d, mouth sore, chill or fever.    4/16/25 Manoj is here for follow up visit. Her spouse is with her. She has h/o bilateral invasive lobular carcinoma of the breast, ER/OK positive and HER2/deana negative, s/p bilateral mastectomy, bilateral sentinel lymph node biopsy, and right axillary lymph node dissection in 1/2013, s/p adjuvant chemotherapy and adjuvant chest wall radiotherapy, Completed 10-year of endocrine therapy with Letrozole in 7/2023. She developed recurrent disease with a lesion in T6 in 2/2024. CT guided biopsy on 4/17/24 showed metastatic carcinoma consistent from breast primary. She saw Dr. Anaya and received palliative RT 2000cGy to T6 lesion and completed RT on 5/22/24. She took Xeloda for 3 months and did not tolerate. Xeloda was discontinued. She had repeat PET/CT in 9/16/24 which showed interval resolution of uptake in T6 but new uptake in L1 and new pleural effusion. She completed XRT 5 fractions on 10/1/24 -10/7/24. She was taking Letrozole. PET/CT on 1/7/25 showed several new foci of pathologic FDG uptake in bones, suspicious for biologic tumor activity. She started weekly Taxol, 2 weeks on and 1 week off on 1/31/25, s/p 3 cycles and tolerated. She feels some numbness and tingling in her hands and feet. She does not have n/v/d, mouth sore, chill or fever.    4/30/25 Manoj is here for follow up visit and chemotherapy. Her  is with her. She has h/o bilateral invasive lobular carcinoma of the breast, ER/OK positive and HER2/deana negative, s/p bilateral mastectomy, bilateral sentinel lymph node biopsy, and right axillary lymph node dissection in 1/2013, s/p adjuvant chemotherapy and adjuvant chest wall radiotherapy, Completed 10-year of endocrine therapy with Letrozole in 7/2023. She developed recurrent disease with a lesion in T6 in 2/2024. CT guided biopsy on 4/17/24 showed metastatic carcinoma consistent from breast primary. She received palliative RT 2000cGy to T6 lesion and completed RT on 5/22/24. She took Xeloda for 3 months and did not tolerate. Xeloda was discontinued. She had repeat PET/CT in 9/16/24 which showed interval resolution of uptake in T6 but new uptake in L1 and new pleural effusion. She completed XRT 5 fractions on 10/1/24 -10/7/24. She was taking Letrozole. PET/CT on 1/7/25 showed several new foci of pathologic FDG uptake in bones, suspicious for biologic tumor activity. She started weekly Taxol, 2 weeks on and 1 week off on 1/31/25, s/p 4 cycles and tolerated. She has mild neuropathy symptoms. She does not have n/v/d, mouth sore, chill or fever.    5/21/25 Manoj is here for follow up visit and chemotherapy. Her  is with her. She has h/o bilateral invasive lobular carcinoma of the breast, ER/OK positive and HER2/deana negative, s/p bilateral mastectomy, bilateral sentinel lymph node biopsy, and right axillary lymph node dissection in 1/2013, s/p adjuvant chemotherapy and adjuvant chest wall radiotherapy, Completed 10-year of endocrine therapy with Letrozole in 7/2023. She developed recurrent disease with a lesion in T6 in 2/2024. CT guided biopsy on 4/17/24 showed metastatic carcinoma consistent from breast primary, triple negative. She received palliative RT 2000cGy to T6 lesion and completed RT on 5/22/24. She took Xeloda for 3 months and did not tolerate. Xeloda was discontinued. She had repeat PET/CT in 9/16/24 which showed interval resolution of uptake in T6 but new uptake in L1 and new pleural effusion. She completed XRT 5 fractions on 10/1/24 -10/7/24. She was taking Letrozole. PET/CT on 1/7/25 showed several new foci of pathologic FDG uptake in bones, suspicious for biologic tumor activity. She started weekly Taxol, 2 weeks on and 1 week off on 1/31/25, s/p 5 cycles and is tolerating. She feels neuropathy which is getting worse on the right foot. She had explosive diarrhea last Sunday which resolved after taking Imodium. She gets intermittent back pain, managed with Aleeve. She denies nausea, vomiting, fever or chills.  6/11/25 Manoj is here for follow up visit and chemotherapy. Her  is with her. She has h/o bilateral invasive lobular carcinoma of the breast, ER/OK positive and HER2/deana negative, s/p bilateral mastectomy, bilateral sentinel lymph node biopsy, and right axillary lymph node dissection in 1/2013, s/p adjuvant chemotherapy and adjuvant chest wall radiotherapy, Completed 10-year of endocrine therapy with Letrozole in 7/2023. She developed recurrent disease with a lesion in T6 in 2/2024. CT guided biopsy on 4/17/24 showed metastatic carcinoma consistent from breast primary, triple negative. She received palliative RT 2000cGy to T6 lesion and completed RT on 5/22/24. She took Xeloda for 3 months and did not tolerate. Xeloda was discontinued. She had repeat PET/CT in 9/16/24 which showed interval resolution of uptake in T6 but new uptake in L1 and new pleural effusion. She completed XRT 5 fractions on 10/1/24 -10/7/24. She was taking Letrozole. PET/CT on 1/7/25 showed several new foci of pathologic FDG uptake in bones, suspicious for biologic tumor activity. She started weekly Taxol, 2 weeks on and 1 week off on 1/31/25, s/p 6 cycles and is tolerating. She feels neuropathy which is getting worse on the right foot. She had explosive diarrhea last Sunday which resolved after taking Imodium. She gets intermittent back pain, managed with Aleeve. She denies nausea, vomiting, fever or chills.  7/02/25 Manoj is here for follow up visit and chemotherapy. Her  is with her. She has h/o bilateral invasive lobular carcinoma of the breast, ER/OK positive and HER2/deana negative, s/p bilateral mastectomy, bilateral sentinel lymph node biopsy, and right axillary lymph node dissection in 1/2013, s/p adjuvant chemotherapy and adjuvant chest wall radiotherapy, completed 10-year of endocrine therapy with Letrozole in 7/2023. She developed recurrent disease with a lesion in T6 in 2/2024. CT guided biopsy on 4/17/24 showed metastatic carcinoma consistent from breast primary, triple negative. She received palliative RT 2000cGy to T6 lesion and completed RT on 5/22/24. She took Xeloda for 3 months and did not tolerate. Xeloda was discontinued. She had repeat PET/CT in 9/16/24 which showed interval resolution of uptake in T6 but new uptake in L1 and new pleural effusion. She completed XRT 5 fractions on 10/1/24 -10/7/24. She was taking Letrozole. PET/CT on 1/7/25 showed several new foci of pathologic FDG uptake in bones, suspicious for biologic tumor activity. She started weekly Taxol, 2 weeks on and 1 week off on 1/31/25, s/p 7 cycles and is tolerating. She has neuropathy on her feet, refused to take Gabapentin. She gets intermittent back pain, managed with Aleeve. She denies nausea, vomiting, fever or chills.

## 2025-07-02 NOTE — REVIEW OF SYSTEMS
[Diarrhea: Grade 1 - Increase of <4 stools per day over baseline; mild increase in ostomy output compared to baseline] : Diarrhea: Grade 1 - Increase of <4 stools per day over baseline; mild increase in ostomy output compared to baseline [Joint Pain] : joint pain [Muscle Pain] : muscle pain [FreeTextEntry7] : Intermittent diarrhea, managed with Imodium. [Diarrhea: Grade 0] : Diarrhea: Grade 0 [Negative] : Gastrointestinal [FreeTextEntry9] : Chronic low back pain, managed with Aleeve.

## 2025-07-02 NOTE — PHYSICAL EXAM
[Fully active, able to carry on all pre-disease performance without restriction] : Status 0 - Fully active, able to carry on all pre-disease performance without restriction [Normal] : full range of motion and no deformities appreciated [de-identified] : Right chestchemoport, intact and no erythema noted. [de-identified] :  Status post bilateral mastectomy and autologous tissue reconstruction. There is no skin lesion and no palpable axillary lymphadenopathy. [de-identified] : Lower abdominal fat necrosis. Abdominal scar present.  [de-identified] : Low back pain 2' metastatic disease.

## 2025-07-02 NOTE — ASSESSMENT
[FreeTextEntry1] : Assessment and Plan: # H/O Bilateral invasive lobular carcinoma of the breast, ER/LA positive and HER2/deana negative, status post bilateral mastectomy, bilateral sentinel lymph node biopsy, and right axillary lymph node dissection, status post adjuvant chemotherapy and adjuvant chest wall radiotherapy, Completed 10-year of endocrine therapy with Letrozole in 7/2023.  - S/P b/l mastectomy. There is no palpable abnormality.  # Recurrent breast cancer(ER/LA/Her 2 negative Biopsy proven) with T6 vertebral body lesion S/P XRT(5/24) now presenting with new L1 lesion S/P XRT  9.24 - MRI spine on 3/11/24 showed a 2.7 cm lesion within the left aspect of the T6 vertebral body and 7 mm marrow replacing lesion within the T11 vertebral body.  - PET/CT on 3/28/24 showed Intense pathologic FDG uptake (SUV 9.3) coregistering with sclerotic lesion on the left side of T6 suspicious for biologic tumor activity. No other sites of abnormal FDG uptake.  - CT guided core bx of the lesion in T6 vertebral body on 4/17/24 showed metastatic carcinoma, favor breast primary, ER/LA negative. Her2 is negative.  - S/P palliative RT 2000cGy to T6 lesion and completed RT on 5/22/24. - She took Xeloda for 3 months, 1500 mg twice a day, one week on and one week off. Due to frequent diarrhea, dose reduced to Xeloda 1000 mg q12h, 1 week on and 1 week off.  She developed multiple side effects and Xeloda was discontinued.  -- PET/CT in 9/16/24 showed interval resolution of uptake in T6 and New uptake in L1 and new pleural effusion.  -- MRI of lumbar spine 9/25/24 showed infiltrative enhancing bone marrow signal abnormality within the T11 and L1 vertebral bodies most compatible with osseous metastatic disease. Mild compression deformity of L1, likely pathologic. -- She received XRT 5 fractions 2000cGy to Vertebrae, Lumbar on 10/1/24 - 10/7/24. -- Recurrent breast cancer in the spine is triple negative. Since her initial breast cancer was hormone receptor and bone met biopsy could have inadequate IHC staining for hormone receptor, she was given Letrozole.  -- Repeat PET/CT on 1/7/25 showed several new foci of pathologic FDG uptake in bones, suspicious for biologic tumor activity. Interval resolution of previously noted small right pleural effusion. -- In light of progression of disease, she switched to Taxol 80 mg weekly, 2 weeks on and one week off. S/P 7 cycles. Repeat PET/CT on 4/23/25 showed stable bony mets with SUV values trending down in some areas and trending up slightly in the other areas. There is no visceral mets.  -- Will proceed with 8th cycle of weekly Taxol, 2 weeks on and 1 week off. CBC today reviewed and is adequate. Mild leucopenia with normal ANC and mild anemia noted, will monitor. -- Will repeat PET scan to eval treatment response, script given. -- Will order liquid NGS and PD-L1 to look for additional treatment options.  -- Continue X-Geva injection monthly. Dental clearance was done.  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 Emphasized to take Gabapentin.  -- Mild elevated AST and ALT. Resolved. Will continue monitoring.  -- Order blood work: CBC, BMP, LFT, Mg, CEA and .  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 She saw neurosurgeon.   #Low back pain.  -- S/P L1 kyphoplasty on 4/11/25. Pain has reduced.  -- NSAIDs as needed.  -- F/U with Neurosurgery, Dr Taylor as scheduled.  RTO in 3 weeks. Case was seen and discussed with Dr. Tovar who agreed with assessment and plan.

## 2025-07-02 NOTE — END OF VISIT
[FreeTextEntry3] : I was physically present for the key portions of the evaluation and management service provided. I agree with the history and physical, and plan which I have reviewed and edited where appropriate.

## 2025-07-02 NOTE — HISTORY OF PRESENT ILLNESS
[de-identified] : This 68-year-old female is here today for a followup visit.  She has a history of bilateral invasive lobular carcinoma, stage IIIA (pT1c N2a M0) in the right breast, and stage IA (pT1b N0 M0) in the left breast.  Both sided breast cancer were ER/SD positive and HER2/deana negative.  She had bilateral mastectomy, bilateral sentinel lymph node biopsy, and right axillary lymph node dissection at [facility] on 01/08/2013.  She received adjuvant chemotherapy with Adriamycin and Cytoxan followed by paclitaxel.  She also received post mastectomy adjuvant radiotherapy to right side chest wall.  She has been on adjuvant endocrine therapy with Femara since 7/2013  and has been tolerating well.  She had genetic test for BRCA mutations.  She is negative for BRCA1/2 mutations.  In 10/2014, she had a bone density at [facility], which showed osteopenia. Her latest bone density was done 7/21/16 which showed osteopenia. MRI of breasts was done on 1/30/17 which showed no MR evidence of malignancy in either breast. Recommendation: Unless otherwise indicated by clinical findings, annual screening mammography recommended. This can be alternated at 6 month intervals with bilateral screening breast MRI.\par   She has been taking calcium and vitamin D supplements. Latest colonoscopy 11/2016 pt states it was negative. Saw Dr. Garcia 1/2017.\par  She saw Dr. Rankin recently. She is scheduled to have a revision of her lower abdominal fat necrosis on 1/2018.\par  \par   [de-identified] : 6/8/18: She stopped letrozole few months ago as she was found to be in Afib by her cardiologist on Pre-op clearance for abdominal fat necrosis. However her cardiologist () told her that you can resume Letrozole. She had surgery for abdominal fat necrosis and she is going for surgery for incisional hernia by .   12/13/18: The patient is here for followup visit. She has been taking Letrozole daily (since 7/2013) and tolerating well. She had hernia repair surgery. She complains some pain in her knees. She had MRI breast in 1 2017. There was no suspicious finding.  6/14/19; The patient is here for followup visit. She has been taking Letrozole daily (since 7/2013) and tolerating well. She complains some pain in her knees. She does not have other new complains. She has mild elevated calcium. PHT was normal.  12/13/2019 :  The patient is here for follow up. She has been on letrozole daily and tolerating well. She had a bone density 12/2/109 which showed osteopenia of femur and hip. She is not taking calcium and vit D since her last calcium was elevated 10.7. She had a recent US breasts 12/2019 for breast pain which was negative. She was seen by her cardiologist and had blood work in the summer. She was told that she has severe anemia. She started her on iron pills. She denies any vaginal bleeding or rectal bleeding  6/12/2020: The patient is here for follow up. She had bilateral invasive lobular carcinoma of the breast, ER/NY positive and HER2/deana negative, status post bilateral mastectomy, bilateral sentinel lymph node biopsy, and right axillary lymph node dissection in 1/2013, status post adjuvant chemotherapy and adjuvant chest wall radiotherapy, currently on adjuvant endocrine therapy with letrozole and tolerating well. She had a bone density 12/2/19 which showed osteopenia of femur and hip. She is not taking calcium and vit D since her last calcium was elevated 10.7. She had a recent US breasts 12/2019 for breast pain which was negative. She does not have new complains.  12/02/2020: MANOJ is here for follow up visit for bilateral invasive lobular carcinoma of the breast, ER/NY positive and HER2/deana negative, status post bilateral mastectomy, bilateral sentinel lymph node biopsy, and right axillary lymph node dissection in 1/2013, status post adjuvant chemotherapy and adjuvant chest wall radiotherapy, currently on adjuvant endocrine therapy with letrozole and tolerating well besides mild arthralgia. She had a bone density 12/2/19 which showed osteopenia of femur and hip. She continues on Vitamin D daily. She denies any new breast symptoms at this time. In addition, patient was diagnosed with COVID in 3/2020.   06/02/2021: MANOJ is here for follow up visit for bilateral invasive lobular carcinoma of the breast, ER/NY positive and HER2/deana negative, status post bilateral mastectomy, bilateral sentinel lymph node biopsy, and right axillary lymph node dissection in 1/2013, status post adjuvant chemotherapy and adjuvant chest wall radiotherapy, currently on adjuvant endocrine therapy with letrozole since 7/2013 and tolerating well besides mild arthralgia. She had a bone density 12/2/19 which showed osteopenia of femur and hip. She continues on Vitamin D daily. She denies any new breast symptoms at this time. she had right breast skin tag biopsied by dermatologist; which was benign.   1/5/22 MANOJ is here for follow up visit for bilateral invasive lobular carcinoma of the breast, ER/NY positive and HER2/deana negative, status post bilateral mastectomy, bilateral sentinel lymph node biopsy, and right axillary lymph node dissection in 1/2013, status post adjuvant chemotherapy and adjuvant chest wall radiotherapy, currently on adjuvant endocrine therapy with letrozole since 7/2013 and tolerating well besides mild arthralgia. She had a bone density 12/7/21 which showed worsening osteopenia of femur and hip, T score -2.4. She is not complaint with Vitamin D, does not take calcium due to mildly elevated Calcium. She denies any new breast symptoms at this time. She was experiencing lower abdominal and pelvic pain in Sept had US which was unremarkable. In addition, had Thyroid US due to hypercalcemia which was unremarkable, In 8/2021 TSH 2.36, Calcium 10, and PTH was 78.   7/21/22: Manoj is here for follow up visit for bilateral invasive lobular carcinoma of the breast, ER/NY positive and HER2/deana negative, status post bilateral mastectomy, bilateral sentinel lymph node biopsy, and right axillary lymph node dissection in 1/2013, status post adjuvant chemotherapy and adjuvant chest wall radiotherapy, currently on adjuvant endocrine therapy with letrozole since 7/2013 and tolerating well besides mild arthralgia. She had a bone density 12/7/21 which showed worsening osteopenia of femur and hip, T score -2.4. She takes Vitamin D but not calcium because her calcium has been mildly elevated. She was found to have large hiatal hernia and she has had on and off diarrhea for long time. She has been seen by Dr. Borjas.   01/26/2023 Manoj is here for follow up visit for bilateral invasive lobular carcinoma of the breast, ER/NY positive and HER2/deana negative, status post bilateral mastectomy, bilateral sentinel lymph node biopsy, and right axillary lymph node dissection in 1/2013, status post adjuvant chemotherapy and adjuvant chest wall radiotherapy, currently on adjuvant endocrine therapy with letrozole since 7/2013 and tolerating well besides mild arthralgia. She had a bone density 12/7/21 which showed worsening osteopenia of femur and hip, T score -2.4. She takes Vitamin D but not calcium because her calcium has been mildly elevated. She was found to have large hiatal hernia and surgery on 11/21/22. Her Hb drooped to 9.5 g/dl after surgery. She was found to have high serum calcium but repeat calcium was normal. PTH was also within normal limit.  7/20/23 Manoj is here for follow up visit for bilateral invasive lobular carcinoma of the breast, ER/NY positive and HER2/deana negative, status post bilateral mastectomy, bilateral sentinel lymph node biopsy, and right axillary lymph node dissection in 1/2013, status post adjuvant chemotherapy and adjuvant chest wall radiotherapy, currently on adjuvant endocrine therapy with letrozole since 7/2013 and tolerating well besides mild arthralgia. She had a bone density 12/7/21 which showed worsening osteopenia of femur and hip, T score -2.4. She takes Vitamin D but not calcium because her calcium has been mildly elevated. Repeat serum calcium normal, MM panel normal. She was found to have large hiatal hernia and surgery on 11/21/22. Her Hb drooped to 9.5 g/dl after surgery, now normal. After hernia repair she was having symptoms consistent with vagal nerve mediated gastroparesis, she then had endoscopic pyloromyotomy, symptoms resolved.   1/25/2024: Manoj is here for follow up visit for bilateral invasive lobular carcinoma of the breast, ER/NY positive and HER2/deana negative, status post bilateral mastectomy, bilateral sentinel lymph node biopsy, and right axillary lymph node dissection in 1/2013, status post adjuvant chemotherapy and adjuvant chest wall radiotherapy, currently on adjuvant endocrine therapy with letrozole since 7/2013 and tolerating well besides mild arthralgia. She Completed 10 years treatment of Letrozole in 7/2023. She had a bone density 12/8/23 Showed:         ----------------------------------------------------------------- AP Spine (L1-L4)         0.998   -0.4      1.8     Normal Femoral Neck (Left) 0.552   -2.7     -0.7     Osteoporosis Total Hip (Left) 0.676   -2.2     -0.6     Osteopenia -which showed Osteoporosis of femur Neck and Osteopenia in the Hip. She takes Vitamin D.  After hernia repair, she was having symptoms consistent with vagal nerve mediated gastroparesis, she then had endoscopic pyloromyotomy, symptoms resolved.  She starts feeling better now.   3/29/24: Manoj is here for follow up visit. She has h/o bilateral invasive lobular carcinoma of the breast, ER/NY positive and HER2/deana negative, status post bilateral mastectomy, bilateral sentinel lymph node biopsy, and right axillary lymph node dissection in 1/2013, status post adjuvant chemotherapy and adjuvant chest wall radiotherapy, currently on adjuvant endocrine therapy with letrozole since 7/2013. She had CT AP in 2/2024 for abdominal pain which incidentally showed a lesion in T6. She had MRI spine on 3/11/24 which showed a 2.7 cm lesion within the left aspect of the T6 vertebral body and 7 mm marrow replacing lesion within the T11 vertebral body.  PET/CT on 3/28/24 showed Intense pathologic FDG uptake (SUV 9.3) coregistering with sclerotic lesion on the left side of T6 suspicious for biologic tumor activity. No other sites of abnormal FDG uptake. She is here today to discuss further management plan.  4/29/24: Manoj is here for follow up visit. She has h/o bilateral invasive lobular carcinoma of the breast, ER/NY positive and HER2/deana negative, status post bilateral mastectomy, bilateral sentinel lymph node biopsy, and right axillary lymph node dissection in 1/2013, status post adjuvant chemotherapy and adjuvant chest wall radiotherapy, Completed 10-year of endocrine therapy with Letrozole in 7/2023. She had CT AP in 2/2024 for abdominal pain which incidentally showed a lesion in T6. She had MRI spine on 3/11/24 which showed a 2.7 cm lesion within the left aspect of the T6 vertebral body and 7 mm marrow replacing lesion within the T11 vertebral body.  PET/CT on 3/28/24 showed Intense pathologic FDG uptake (SUV 9.3) coregistering with sclerotic lesion on the left side of T6 suspicious for biologic tumor activity. No other sites of abnormal FDG uptake. She had CT guided biopsy on 4/17/24 and is here today to discuss the result.     5/30/24: Manoj is here for follow up visit. She has h/o bilateral invasive lobular carcinoma of the breast, ER/NY positive and HER2/deana negative, status post bilateral mastectomy, bilateral sentinel lymph node biopsy, and right axillary lymph node dissection in 1/2013, status post adjuvant chemotherapy and adjuvant chest wall radiotherapy, Completed 10-year of endocrine therapy with Letrozole in 7/2023. She had CT AP in 2/2024 for abdominal pain which incidentally showed a lesion in T6. She had MRI spine on 3/11/24 which showed a 2.7 cm lesion within the left aspect of the T6 vertebral body and 7 mm marrow replacing lesion within the T11 vertebral body.  PET/CT on 3/28/24 showed Intense pathologic FDG uptake (SUV 9.3) coregistering with sclerotic lesion on the left side of T6 suspicious for biologic tumor activity.  She had CT guided biopsy on 4/17/24 which showed metastatic carcinoma consistent from breast primary. She saw Dr. Anaya and received palliative RT 2000cGy to T6 lesion and completed RT on 5/22/24.  6/27/24: Manoj is here for follow up visit. She has h/o bilateral invasive lobular carcinoma of the breast, ER/NY positive and HER2/deana negative, status post bilateral mastectomy, bilateral sentinel lymph node biopsy, and right axillary lymph node dissection in 1/2013, status post adjuvant chemotherapy and adjuvant chest wall radiotherapy, Completed 10-year of endocrine therapy with Letrozole in 7/2023. She had CT AP in 2/2024 for abdominal pain which incidentally showed a lesion in T6. She had MRI spine on 3/11/24 which showed a 2.7 cm lesion within the left aspect of the T6 vertebral body and 7 mm marrow replacing lesion within the T11 vertebral body.  PET/CT on 3/28/24 showed Intense pathologic FDG uptake (SUV 9.3) coregistering with sclerotic lesion on the left side of T6 suspicious for biologic tumor activity.  She had CT guided biopsy on 4/17/24 which showed metastatic carcinoma consistent from breast primary. She saw Dr. Anaya and received palliative RT 2000cGy to T6 lesion and completed RT on 5/22/24. She started on Xeloda and had 1st week treatment. She had loose stool but otherwise tolerated well.   08/01/24: bilateral sentinel lymph node biopsy, and right axillary lymph node dissection in 1/2013, status post adjuvant chemotherapy and adjuvant chest wall radiotherapy, Completed 10-year of endocrine therapy with Letrozole in 7/2023. She had CT AP in 2/2024 for abdominal pain which incidentally showed a lesion in T6. She had MRI spine on 3/11/24 which showed a 2.7 cm lesion within the left aspect of the T6 vertebral body and 7 mm marrow replacing lesion within the T11 vertebral body.  PET/CT on 3/28/24 showed Intense pathologic FDG uptake (SUV 9.3) coregistering with sclerotic lesion on the left side of T6 suspicious for biologic tumor activity.  She had CT guided biopsy on 4/17/24 which showed metastatic carcinoma consistent from breast primary. She saw Dr. Anaya and received palliative RT 2000cGy to T6 lesion and completed RT on 5/22/24. She started on Xeloda and had 1st week of June 2024. She is complaining of persistence diarrhea despite Imodium intake, currently on Xeloda 3tabs every 12 hrs for one week on and one week off.   8/29/24: Manoj is here for follow up visit. She has h/o bilateral invasive lobular carcinoma of the breast, ER/NY positive and HER2/deana negative, status post bilateral mastectomy, bilateral sentinel lymph node biopsy, and right axillary lymph node dissection in 1/2013, status post adjuvant chemotherapy and adjuvant chest wall radiotherapy, Completed 10-year of endocrine therapy with Letrozole in 7/2023. She had CT AP in 2/2024 for abdominal pain which incidentally showed a lesion in T6. She had MRI spine on 3/11/24 which showed a 2.7 cm lesion within the left aspect of the T6 vertebral body and 7 mm marrow replacing lesion within the T11 vertebral body.  PET/CT on 3/28/24 showed Intense pathologic FDG uptake (SUV 9.3) coregistering with sclerotic lesion on the left side of T6 suspicious for biologic tumor activity.  She had CT guided biopsy on 4/17/24 which showed metastatic carcinoma consistent from breast primary. She saw Dr. Anaya and received palliative RT 2000cGy to T6 lesion and completed RT on 5/22/24. She has been on Xeloda and did not tolerate well. She complains diarrhea, weight loss and eye irritation.   10/10/24 Manoj is here for follow up visit. She has h/o bilateral invasive lobular carcinoma of the breast, ER/NY positive and HER2/deana negative, status post bilateral mastectomy, bilateral sentinel lymph node biopsy, and right axillary lymph node dissection in 1/2013, status post adjuvant chemotherapy and adjuvant chest wall radiotherapy, Completed 10-year of endocrine therapy with Letrozole in 7/2023. She had CT AP in 2/2024 for abdominal pain which incidentally showed a lesion in T6. She had MRI spine on 3/11/24 which showed a 2.7 cm lesion within the left aspect of the T6 vertebral body and 7 mm marrow replacing lesion within the T11 vertebral body.  PET/CT on 3/28/24 showed Intense pathologic FDG uptake (SUV 9.3) coregistering with sclerotic lesion on the left side of T6 suspicious for biologic tumor activity.  She had CT guided biopsy on 4/17/24 which showed metastatic carcinoma consistent from breast primary. She saw Dr. Anaya and received palliative RT 2000cGy to T6 lesion and completed RT on 5/22/24. She took Xeloda for 3 months and did not tolerate well. Xeloda was discontinued. She had a PET scan in 9.24 which showed interval resolution of uptake in T6 and New uptake in L1 and new pleural effussion. She completed XRT 5 fractions in 9.24.  She is here for follow up.  11/11/24 Manoj is here for follow up visit. She has h/o bilateral invasive lobular carcinoma of the breast, ER/NY positive and HER2/deana negative, status post bilateral mastectomy, bilateral sentinel lymph node biopsy, and right axillary lymph node dissection in 1/2013, status post adjuvant chemotherapy and adjuvant chest wall radiotherapy, Completed 10-year of endocrine therapy with Letrozole in 7/2023. She developed recurrent disease with a lesion in T6 in 2/2024. CT guided biopsy on 4/17/24 showed metastatic carcinoma consistent from breast primary. She saw Dr. Anaya and received palliative RT 2000cGy to T6 lesion and completed RT on 5/22/24. She took Xeloda for 3 months and did not tolerate well. Xeloda was discontinued. She had repeat PET/CT in 9.24 which showed interval resolution of uptake in T6 but new uptake in L1 and new pleural effusion. She completed XRT 5 fractions. She complains low back pain.   12/30/24 Manoj is here for follow up visit. Her spouse is with her. She has h/o bilateral invasive lobular carcinoma of the breast, ER/NY positive and HER2/deana negative, s/p bilateral mastectomy, bilateral sentinel lymph node biopsy, and right axillary lymph node dissection in 1/2013, s/p adjuvant chemotherapy and adjuvant chest wall radiotherapy, Completed 10-year of endocrine therapy with Letrozole in 7/2023. She developed recurrent disease with a lesion in T6 in 2/2024. CT guided biopsy on 4/17/24 showed metastatic carcinoma consistent from breast primary. She saw Dr. Anaya and received palliative RT 2000cGy to T6 lesion and completed RT on 5/22/24. She took Xeloda for 3 months and did not tolerate well. Xeloda was discontinued. She had repeat PET/CT in 9/16/24 which showed interval resolution of uptake in T6 but new uptake in L1 and new pleural effusion. MR Lumbar spine on 9/25/24 showed Infiltrative enhancing bone marrow signal abnormality within the T11 and L1 vertebral bodies most compatible with osseous metastatic disease. Mild compression deformity of L1, likely pathologic. Multilevel degenerative changes as described. She completed XRT 5 fractions on 10/1/24 -10/7/24. She has a history of fall on 9/2024. CT chest 12/12/24 showed no pleural effusion. She complains low back pain, taking Aleeve as needed with relief.  1/14/25: Manoj is here for follow up visit. Her spouse is with her. She has h/o bilateral invasive lobular carcinoma of the breast, ER/NY positive and HER2/deana negative, s/p bilateral mastectomy, bilateral sentinel lymph node biopsy, and right axillary lymph node dissection in 1/2013, s/p adjuvant chemotherapy and adjuvant chest wall radiotherapy, Completed 10-year of endocrine therapy with Letrozole in 7/2023. She developed recurrent disease with a lesion in T6 in 2/2024. CT guided biopsy on 4/17/24 showed metastatic carcinoma consistent from breast primary. She saw Dr. Anaya and received palliative RT 2000cGy to T6 lesion and completed RT on 5/22/24. She took Xeloda for 3 months and did not tolerate. Xeloda was discontinued. She had repeat PET/CT in 9/16/24 which showed interval resolution of uptake in T6 but new uptake in L1 and new pleural effusion.  She completed XRT 5 fractions on 10/1/24 -10/7/24. She has been taking Letrozole. She feels some aches and pains.   2/20/25 Manoj is here for follow up visit. Her spouse is with her. She has h/o bilateral invasive lobular carcinoma of the breast, ER/NY positive and HER2/deana negative, s/p bilateral mastectomy, bilateral sentinel lymph node biopsy, and right axillary lymph node dissection in 1/2013, s/p adjuvant chemotherapy and adjuvant chest wall radiotherapy, Completed 10-year of endocrine therapy with Letrozole in 7/2023. She developed recurrent disease with a lesion in T6 in 2/2024. CT guided biopsy on 4/17/24 showed metastatic carcinoma consistent from breast primary. She saw Dr. Anaya and received palliative RT 2000cGy to T6 lesion and completed RT on 5/22/24. She took Xeloda for 3 months and did not tolerate. Xeloda was discontinued. She had repeat PET/CT in 9/16/24 which showed interval resolution of uptake in T6 but new uptake in L1 and new pleural effusion. She completed XRT 5 fractions on 10/1/24 -10/7/24. She has been taking Letrozole. PET/CT on 1/7/25 showed several new foci of pathologic FDG uptake in bones, suspicious for biologic tumor activity. Interval resolution of previously noted small right pleural effusion. She feels some aches and pains. She started weekly Taxol, 2 weeks on and 1 week off on 1/31/25, s/p cycle #1 and is tolerating well except for diarrhea yesterday, managed with Imodium. She denies nausea, vomiting, or numbness or tingling of hands/feet. She saw Dr Taylor and is planned for L1 kyphoplasty on 3/18/25.  3/13/25 Manoj is here for follow up visit. Her spouse is with her. She has h/o bilateral invasive lobular carcinoma of the breast, ER/NY positive and HER2/deana negative, s/p bilateral mastectomy, bilateral sentinel lymph node biopsy, and right axillary lymph node dissection in 1/2013, s/p adjuvant chemotherapy and adjuvant chest wall radiotherapy, Completed 10-year of endocrine therapy with Letrozole in 7/2023. She developed recurrent disease with a lesion in T6 in 2/2024. CT guided biopsy on 4/17/24 showed metastatic carcinoma consistent from breast primary. She saw Dr. Anaya and received palliative RT 2000cGy to T6 lesion and completed RT on 5/22/24. She took Xeloda for 3 months and did not tolerate. Xeloda was discontinued. She had repeat PET/CT in 9/16/24 which showed interval resolution of uptake in T6 but new uptake in L1 and new pleural effusion. She completed XRT 5 fractions on 10/1/24 -10/7/24. She has been taking Letrozole. PET/CT on 1/7/25 showed several new foci of pathologic FDG uptake in bones, suspicious for biologic tumor activity. She started weekly Taxol, 2 weeks on and 1 week off on 1/31/25, s/p 2 cycles and tolerated.    4/3/25 Manoj is here for follow up visit. Her spouse is with her. She has h/o bilateral invasive lobular carcinoma of the breast, ER/[place] positive and HER2/deana negative, s/p bilateral mastectomy, bilateral sentinel lymph node biopsy, and right axillary lymph node dissection in 1/2013, s/p adjuvant chemotherapy and adjuvant chest wall radiotherapy, Completed 10-year of endocrine therapy with Letrozole in 7/2023. She developed recurrent disease with a lesion in T6 in 2/2024. CT guided biopsy on 4/17/24 showed metastatic carcinoma consistent from breast primary. She saw Dr. Anaya and received palliative RT 2000cGy to T6 lesion and completed RT on 5/22/24. She took Xeloda for 3 months and did not tolerate. Xeloda was discontinued. She had repeat PET/CT in 9/16/24 which showed interval resolution of uptake in T6 but new uptake in L1 and new pleural effusion. She completed XRT 5 fractions on 10/1/24 -10/7/24. She was taking Letrozole. PET/CT on 1/7/25 showed several new foci of pathologic FDG uptake in bones, suspicious for biologic tumor activity. She started weekly Taxol, 2 weeks on and 1 week off on 1/31/25, s/p 3 cycles and tolerated. She feels some numbness and tingling in her hands and feet. She does not have n/v/d, mouth sore, chill or fever.    4/16/25 Manoj is here for follow up visit. Her spouse is with her. She has h/o bilateral invasive lobular carcinoma of the breast, ER/[place] positive and HER2/deana negative, s/p bilateral mastectomy, bilateral sentinel lymph node biopsy, and right axillary lymph node dissection in 1/2013, s/p adjuvant chemotherapy and adjuvant chest wall radiotherapy, Completed 10-year of endocrine therapy with Letrozole in 7/2023. She developed recurrent disease with a lesion in T6 in 2/2024. CT guided biopsy on 4/17/24 showed metastatic carcinoma consistent from breast primary. She saw Dr. Anaya and received palliative RT 2000cGy to T6 lesion and completed RT on 5/22/24. She took Xeloda for 3 months and did not tolerate. Xeloda was discontinued. She had repeat PET/CT in 9/16/24 which showed interval resolution of uptake in T6 but new uptake in L1 and new pleural effusion. She completed XRT 5 fractions on 10/1/24 -10/7/24. She was taking Letrozole. PET/CT on 1/7/25 showed several new foci of pathologic FDG uptake in bones, suspicious for biologic tumor activity. She started weekly Taxol, 2 weeks on and 1 week off on 1/31/25, s/p 3 cycles and tolerated. She feels some numbness and tingling in her hands and feet. She does not have n/v/d, mouth sore, chill or fever.    4/30/25 Manoj is here for follow up visit and chemotherapy. Her  is with her. She has h/o bilateral invasive lobular carcinoma of the breast, ER/[place] positive and HER2/deana negative, s/p bilateral mastectomy, bilateral sentinel lymph node biopsy, and right axillary lymph node dissection in 1/2013, s/p adjuvant chemotherapy and adjuvant chest wall radiotherapy, Completed 10-year of endocrine therapy with Letrozole in 7/2023. She developed recurrent disease with a lesion in T6 in 2/2024. CT guided biopsy on 4/17/24 showed metastatic carcinoma consistent from breast primary. She received palliative RT 2000cGy to T6 lesion and completed RT on 5/22/24. She took Xeloda for 3 months and did not tolerate. Xeloda was discontinued. She had repeat PET/CT in 9/16/24 which showed interval resolution of uptake in T6 but new uptake in L1 and new pleural effusion. She completed XRT 5 fractions on 10/1/24 -10/7/24. She was taking Letrozole. PET/CT on 1/7/25 showed several new foci of pathologic FDG uptake in bones, suspicious for biologic tumor activity. She started weekly Taxol, 2 weeks on and 1 week off on 1/31/25, s/p 4 cycles and tolerated. She has mild neuropathy symptoms. She does not have n/v/d, mouth sore, chill or fever.    5/21/25 Manoj is here for follow up visit and chemotherapy. Her  is with her. She has h/o bilateral invasive lobular carcinoma of the breast, ER/[place] positive and HER2/deana negative, s/p bilateral mastectomy, bilateral sentinel lymph node biopsy, and right axillary lymph node dissection in 1/2013, s/p adjuvant chemotherapy and adjuvant chest wall radiotherapy, Completed 10-year of endocrine therapy with Letrozole in 7/2023. She developed recurrent disease with a lesion in T6 in 2/2024. CT guided biopsy on 4/17/24 showed metastatic carcinoma consistent from breast primary, triple negative. She received palliative RT 2000cGy to T6 lesion and completed RT on 5/22/24. She took Xeloda for 3 months and did not tolerate. Xeloda was discontinued. She had repeat PET/CT in 9/16/24 which showed interval resolution of uptake in T6 but new uptake in L1 and new pleural effusion. She completed XRT 5 fractions on 10/1/24 -10/7/24. She was taking Letrozole. PET/CT on 1/7/25 showed several new foci of pathologic FDG uptake in bones, suspicious for biologic tumor activity. She started weekly Taxol, 2 weeks on and 1 week off on 1/31/25, s/p 5 cycles and is tolerating. She feels neuropathy which is getting worse on the right foot. She had explosive diarrhea last Sunday which resolved after taking Imodium. She gets intermittent back pain, managed with Aleeve. She denies nausea, vomiting, fever or chills.  6/11/25 Manoj is here for follow up visit and chemotherapy. Her  is with her. She has h/o bilateral invasive lobular carcinoma of the breast, ER/[place] positive and HER2/deana negative, s/p bilateral mastectomy, bilateral sentinel lymph node biopsy, and right axillary lymph node dissection in 1/2013, s/p adjuvant chemotherapy and adjuvant chest wall radiotherapy, Completed 10-year of endocrine therapy with Letrozole in 7/2023. She developed recurrent disease with a lesion in T6 in 2/2024. CT guided biopsy on 4/17/24 showed metastatic carcinoma consistent from breast primary, triple negative. She received palliative RT 2000cGy to T6 lesion and completed RT on 5/22/24. She took Xeloda for 3 months and did not tolerate. Xeloda was discontinued. She had repeat PET/CT in 9/16/24 which showed interval resolution of uptake in T6 but new uptake in L1 and new pleural effusion. She completed XRT 5 fractions on 10/1/24 -10/7/24. She was taking Letrozole. PET/CT on 1/7/25 showed several new foci of pathologic FDG uptake in bones, suspicious for biologic tumor activity. She started weekly Taxol, 2 weeks on and 1 week off on 1/31/25, s/p 6 cycles and is tolerating. She feels neuropathy which is getting worse on the right foot. She had explosive diarrhea last Sunday which resolved after taking Imodium. She gets intermittent back pain, managed with Aleeve. She denies nausea, vomiting, fever or chills.  7/02/25 Manoj is here for follow up visit and chemotherapy. Her  is with her. She has h/o bilateral invasive lobular carcinoma of the breast, ER/[place] positive and HER2/deana negative, s/p bilateral mastectomy, bilateral sentinel lymph node biopsy, and right axillary lymph node dissection in 1/2013, s/p adjuvant chemotherapy and adjuvant chest wall radiotherapy, completed 10-year of endocrine therapy with Letrozole in 7/2023. She developed recurrent disease with a lesion in T6 in 2/2024. CT guided biopsy on 4/17/24 showed metastatic carcinoma consistent from breast primary, triple negative. She received palliative RT 2000cGy to T6 lesion and completed RT on 5/22/24. She took Xeloda for 3 months and did not tolerate. Xeloda was discontinued. She had repeat PET/CT in 9/16/24 which showed interval resolution of uptake in T6 but new uptake in L1 and new pleural effusion. She completed XRT 5 fractions on 10/1/24 -10/7/24. She was taking Letrozole. PET/CT on 1/7/25 showed several new foci of pathologic FDG uptake in bones, suspicious for biologic tumor activity. She started weekly Taxol, 2 weeks on and 1 week off on 1/31/25, s/p 7 cycles and is tolerating. She has neuropathy on her feet, refused to take Gabapentin. She gets intermittent back pain, managed with Aleeve. She denies nausea, vomiting, fever or chills.

## 2025-07-16 NOTE — REVIEW OF SYSTEMS
[Diarrhea: Grade 0] : Diarrhea: Grade 0 [Joint Pain] : joint pain [Muscle Pain] : muscle pain [Negative] : Allergic/Immunologic [FreeTextEntry9] : Chronic low back pain, managed with Aleeve.

## 2025-07-16 NOTE — PHYSICAL EXAM
[Fully active, able to carry on all pre-disease performance without restriction] : Status 0 - Fully active, able to carry on all pre-disease performance without restriction [Normal] : affect appropriate [de-identified] : Right chestchemoport, intact and no erythema noted. [de-identified] :  Status post bilateral mastectomy and autologous tissue reconstruction. There is no skin lesion and no palpable axillary lymphadenopathy. [de-identified] : Lower abdominal fat necrosis. Abdominal scar present.  [de-identified] : Low back pain 2' metastatic disease.

## 2025-07-16 NOTE — ASSESSMENT
[FreeTextEntry1] : Assessment and Plan: # H/O Bilateral invasive lobular carcinoma of the breast, ER/VT positive and HER2/deana negative, status post bilateral mastectomy, bilateral sentinel lymph node biopsy, and right axillary lymph node dissection, status post adjuvant chemotherapy and adjuvant chest wall radiotherapy, Completed 10-year of endocrine therapy with Letrozole in 7/2023.  - S/P b/l mastectomy. There is no palpable abnormality.  # Recurrent breast cancer(ER/VT/Her 2 negative Biopsy proven) with T6 vertebral body lesion S/P XRT(5/24) now presenting with new L1 lesion S/P XRT  9.24 - MRI spine on 3/11/24 showed a 2.7 cm lesion within the left aspect of the T6 vertebral body and 7 mm marrow replacing lesion within the T11 vertebral body.  - PET/CT on 3/28/24 showed Intense pathologic FDG uptake (SUV 9.3) coregistering with sclerotic lesion on the left side of T6 suspicious for biologic tumor activity. No other sites of abnormal FDG uptake.  - CT guided core bx of the lesion in T6 vertebral body on 4/17/24 showed metastatic carcinoma, favor breast primary, ER/VT negative. Her2 is negative.  - S/P palliative RT 2000cGy to T6 lesion and completed RT on 5/22/24. - She took Xeloda for 3 months, 1500 mg twice a day, one week on and one week off. Due to frequent diarrhea, dose reduced to Xeloda 1000 mg q12h, 1 week on and 1 week off.  She developed multiple side effects and Xeloda was discontinued.  -- PET/CT in 9/16/24 showed interval resolution of uptake in T6 and New uptake in L1 and new pleural effusion.  -- MRI of lumbar spine 9/25/24 showed infiltrative enhancing bone marrow signal abnormality within the T11 and L1 vertebral bodies most compatible with osseous metastatic disease. Mild compression deformity of L1, likely pathologic. -- She received XRT 5 fractions 2000cGy to Vertebrae, Lumbar on 10/1/24 - 10/7/24. -- Recurrent breast cancer in the spine is triple negative. Since her initial breast cancer was hormone receptor and bone met biopsy could have inadequate IHC staining for hormone receptor, she was given Letrozole.  -- Repeat PET/CT on 1/7/25 showed several new foci of pathologic FDG uptake in bones, suspicious for biologic tumor activity. Interval resolution of previously noted small right pleural effusion. -- In light of progression of disease, she switched to Taxol 80 mg weekly, 2 weeks on and one week off. S/P 7 cycles. Repeat PET/CT on 4/23/25 showed stable bony mets with SUV values trending down in some areas and trending up slightly in the other areas. There is no visceral mets.  -- Will proceed with 8th cycle of weekly Taxol, 2 weeks on and 1 week off. CBC today reviewed and is adequate. Mild leucopenia with normal ANC and mild anemia noted, will monitor. -- Will repeat PET scan to eval treatment response, script given. -- Will order liquid NGS and PD-L1 to look for additional treatment options.  -- Continue X-Geva injection monthly. Dental clearance was done.  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 Emphasized to take Gabapentin.  -- Mild elevated AST and ALT. Resolved. Will continue monitoring.  -- Order blood work: CBC, BMP, LFT, Mg, CEA and .  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 She saw neurosurgeon.   #Low back pain.  -- S/P L1 kyphoplasty on 4/11/25. Pain has reduced.  -- NSAIDs as needed.  -- F/U with Neurosurgery, Dr Taylor as scheduled.  RTO in 3 weeks. Case was seen and discussed with Dr. Tovar who agreed with assessment and plan.

## 2025-07-16 NOTE — HISTORY OF PRESENT ILLNESS
[de-identified] : This 68-year-old female is here today for a followup visit.  She has a history of bilateral invasive lobular carcinoma, stage IIIA (pT1c N2a M0) in the right breast, and stage IA (pT1b N0 M0) in the left breast.  Both sided breast cancer were ER/AL positive and HER2/deana negative.  She had bilateral mastectomy, bilateral sentinel lymph node biopsy, and right axillary lymph node dissection at [facility] on 01/08/2013.  She received adjuvant chemotherapy with Adriamycin and Cytoxan followed by paclitaxel.  She also received post mastectomy adjuvant radiotherapy to right side chest wall.  She has been on adjuvant endocrine therapy with Femara since 7/2013  and has been tolerating well.  She had genetic test for BRCA mutations.  She is negative for BRCA1/2 mutations.  In 10/2014, she had a bone density at [facility], which showed osteopenia. Her latest bone density was done 7/21/16 which showed osteopenia. MRI of breasts was done on 1/30/17 which showed no MR evidence of malignancy in either breast. Recommendation: Unless otherwise indicated by clinical findings, annual screening mammography recommended. This can be alternated at 6 month intervals with bilateral screening breast MRI.\par   She has been taking calcium and vitamin D supplements. Latest colonoscopy 11/2016 pt states it was negative. Saw Dr. Garcia 1/2017.\par  She saw Dr. Rankin recently. She is scheduled to have a revision of her lower abdominal fat necrosis on 1/2018.\par  \par   [de-identified] : 6/8/18: She stopped letrozole few months ago as she was found to be in Afib by her cardiologist on Pre-op clearance for abdominal fat necrosis. However her cardiologist () told her that you can resume Letrozole. She had surgery for abdominal fat necrosis and she is going for surgery for incisional hernia by .   12/13/18: The patient is here for followup visit. She has been taking Letrozole daily (since 7/2013) and tolerating well. She had hernia repair surgery. She complains some pain in her knees. She had MRI breast in 1 2017. There was no suspicious finding.  6/14/19; The patient is here for followup visit. She has been taking Letrozole daily (since 7/2013) and tolerating well. She complains some pain in her knees. She does not have other new complains. She has mild elevated calcium. PHT was normal.  12/13/2019 :  The patient is here for follow up. She has been on letrozole daily and tolerating well. She had a bone density 12/2/109 which showed osteopenia of femur and hip. She is not taking calcium and vit D since her last calcium was elevated 10.7. She had a recent US breasts 12/2019 for breast pain which was negative. She was seen by her cardiologist and had blood work in the summer. She was told that she has severe anemia. She started her on iron pills. She denies any vaginal bleeding or rectal bleeding  6/12/2020: The patient is here for follow up. She had bilateral invasive lobular carcinoma of the breast, ER/ND positive and HER2/deana negative, status post bilateral mastectomy, bilateral sentinel lymph node biopsy, and right axillary lymph node dissection in 1/2013, status post adjuvant chemotherapy and adjuvant chest wall radiotherapy, currently on adjuvant endocrine therapy with letrozole and tolerating well. She had a bone density 12/2/19 which showed osteopenia of femur and hip. She is not taking calcium and vit D since her last calcium was elevated 10.7. She had a recent US breasts 12/2019 for breast pain which was negative. She does not have new complains.  12/02/2020: MANOJ is here for follow up visit for bilateral invasive lobular carcinoma of the breast, ER/ND positive and HER2/deana negative, status post bilateral mastectomy, bilateral sentinel lymph node biopsy, and right axillary lymph node dissection in 1/2013, status post adjuvant chemotherapy and adjuvant chest wall radiotherapy, currently on adjuvant endocrine therapy with letrozole and tolerating well besides mild arthralgia. She had a bone density 12/2/19 which showed osteopenia of femur and hip. She continues on Vitamin D daily. She denies any new breast symptoms at this time. In addition, patient was diagnosed with COVID in 3/2020.   06/02/2021: MANOJ is here for follow up visit for bilateral invasive lobular carcinoma of the breast, ER/ND positive and HER2/deana negative, status post bilateral mastectomy, bilateral sentinel lymph node biopsy, and right axillary lymph node dissection in 1/2013, status post adjuvant chemotherapy and adjuvant chest wall radiotherapy, currently on adjuvant endocrine therapy with letrozole since 7/2013 and tolerating well besides mild arthralgia. She had a bone density 12/2/19 which showed osteopenia of femur and hip. She continues on Vitamin D daily. She denies any new breast symptoms at this time. she had right breast skin tag biopsied by dermatologist; which was benign.   1/5/22 MANOJ is here for follow up visit for bilateral invasive lobular carcinoma of the breast, ER/ND positive and HER2/deana negative, status post bilateral mastectomy, bilateral sentinel lymph node biopsy, and right axillary lymph node dissection in 1/2013, status post adjuvant chemotherapy and adjuvant chest wall radiotherapy, currently on adjuvant endocrine therapy with letrozole since 7/2013 and tolerating well besides mild arthralgia. She had a bone density 12/7/21 which showed worsening osteopenia of femur and hip, T score -2.4. She is not complaint with Vitamin D, does not take calcium due to mildly elevated Calcium. She denies any new breast symptoms at this time. She was experiencing lower abdominal and pelvic pain in Sept had US which was unremarkable. In addition, had Thyroid US due to hypercalcemia which was unremarkable, In 8/2021 TSH 2.36, Calcium 10, and PTH was 78.   7/21/22: Manoj is here for follow up visit for bilateral invasive lobular carcinoma of the breast, ER/ND positive and HER2/deana negative, status post bilateral mastectomy, bilateral sentinel lymph node biopsy, and right axillary lymph node dissection in 1/2013, status post adjuvant chemotherapy and adjuvant chest wall radiotherapy, currently on adjuvant endocrine therapy with letrozole since 7/2013 and tolerating well besides mild arthralgia. She had a bone density 12/7/21 which showed worsening osteopenia of femur and hip, T score -2.4. She takes Vitamin D but not calcium because her calcium has been mildly elevated. She was found to have large hiatal hernia and she has had on and off diarrhea for long time. She has been seen by Dr. Borjas.   01/26/2023 Manoj is here for follow up visit for bilateral invasive lobular carcinoma of the breast, ER/ND positive and HER2/deana negative, status post bilateral mastectomy, bilateral sentinel lymph node biopsy, and right axillary lymph node dissection in 1/2013, status post adjuvant chemotherapy and adjuvant chest wall radiotherapy, currently on adjuvant endocrine therapy with letrozole since 7/2013 and tolerating well besides mild arthralgia. She had a bone density 12/7/21 which showed worsening osteopenia of femur and hip, T score -2.4. She takes Vitamin D but not calcium because her calcium has been mildly elevated. She was found to have large hiatal hernia and surgery on 11/21/22. Her Hb drooped to 9.5 g/dl after surgery. She was found to have high serum calcium but repeat calcium was normal. PTH was also within normal limit.  7/20/23 Manoj is here for follow up visit for bilateral invasive lobular carcinoma of the breast, ER/ND positive and HER2/deana negative, status post bilateral mastectomy, bilateral sentinel lymph node biopsy, and right axillary lymph node dissection in 1/2013, status post adjuvant chemotherapy and adjuvant chest wall radiotherapy, currently on adjuvant endocrine therapy with letrozole since 7/2013 and tolerating well besides mild arthralgia. She had a bone density 12/7/21 which showed worsening osteopenia of femur and hip, T score -2.4. She takes Vitamin D but not calcium because her calcium has been mildly elevated. Repeat serum calcium normal, MM panel normal. She was found to have large hiatal hernia and surgery on 11/21/22. Her Hb drooped to 9.5 g/dl after surgery, now normal. After hernia repair she was having symptoms consistent with vagal nerve mediated gastroparesis, she then had endoscopic pyloromyotomy, symptoms resolved.   1/25/2024: Manoj is here for follow up visit for bilateral invasive lobular carcinoma of the breast, ER/ND positive and HER2/deana negative, status post bilateral mastectomy, bilateral sentinel lymph node biopsy, and right axillary lymph node dissection in 1/2013, status post adjuvant chemotherapy and adjuvant chest wall radiotherapy, currently on adjuvant endocrine therapy with letrozole since 7/2013 and tolerating well besides mild arthralgia. She Completed 10 years treatment of Letrozole in 7/2023. She had a bone density 12/8/23 Showed:         ----------------------------------------------------------------- AP Spine (L1-L4)         0.998   -0.4      1.8     Normal Femoral Neck (Left) 0.552   -2.7     -0.7     Osteoporosis Total Hip (Left) 0.676   -2.2     -0.6     Osteopenia -which showed Osteoporosis of femur Neck and Osteopenia in the Hip. She takes Vitamin D.  After hernia repair, she was having symptoms consistent with vagal nerve mediated gastroparesis, she then had endoscopic pyloromyotomy, symptoms resolved.  She starts feeling better now.   3/29/24: Manoj is here for follow up visit. She has h/o bilateral invasive lobular carcinoma of the breast, ER/ND positive and HER2/deana negative, status post bilateral mastectomy, bilateral sentinel lymph node biopsy, and right axillary lymph node dissection in 1/2013, status post adjuvant chemotherapy and adjuvant chest wall radiotherapy, currently on adjuvant endocrine therapy with letrozole since 7/2013. She had CT AP in 2/2024 for abdominal pain which incidentally showed a lesion in T6. She had MRI spine on 3/11/24 which showed a 2.7 cm lesion within the left aspect of the T6 vertebral body and 7 mm marrow replacing lesion within the T11 vertebral body.  PET/CT on 3/28/24 showed Intense pathologic FDG uptake (SUV 9.3) coregistering with sclerotic lesion on the left side of T6 suspicious for biologic tumor activity. No other sites of abnormal FDG uptake. She is here today to discuss further management plan.  4/29/24: Manoj is here for follow up visit. She has h/o bilateral invasive lobular carcinoma of the breast, ER/ND positive and HER2/deana negative, status post bilateral mastectomy, bilateral sentinel lymph node biopsy, and right axillary lymph node dissection in 1/2013, status post adjuvant chemotherapy and adjuvant chest wall radiotherapy, Completed 10-year of endocrine therapy with Letrozole in 7/2023. She had CT AP in 2/2024 for abdominal pain which incidentally showed a lesion in T6. She had MRI spine on 3/11/24 which showed a 2.7 cm lesion within the left aspect of the T6 vertebral body and 7 mm marrow replacing lesion within the T11 vertebral body.  PET/CT on 3/28/24 showed Intense pathologic FDG uptake (SUV 9.3) coregistering with sclerotic lesion on the left side of T6 suspicious for biologic tumor activity. No other sites of abnormal FDG uptake. She had CT guided biopsy on 4/17/24 and is here today to discuss the result.     5/30/24: Manoj is here for follow up visit. She has h/o bilateral invasive lobular carcinoma of the breast, ER/ND positive and HER2/deana negative, status post bilateral mastectomy, bilateral sentinel lymph node biopsy, and right axillary lymph node dissection in 1/2013, status post adjuvant chemotherapy and adjuvant chest wall radiotherapy, Completed 10-year of endocrine therapy with Letrozole in 7/2023. She had CT AP in 2/2024 for abdominal pain which incidentally showed a lesion in T6. She had MRI spine on 3/11/24 which showed a 2.7 cm lesion within the left aspect of the T6 vertebral body and 7 mm marrow replacing lesion within the T11 vertebral body.  PET/CT on 3/28/24 showed Intense pathologic FDG uptake (SUV 9.3) coregistering with sclerotic lesion on the left side of T6 suspicious for biologic tumor activity.  She had CT guided biopsy on 4/17/24 which showed metastatic carcinoma consistent from breast primary. She saw Dr. Anaya and received palliative RT 2000cGy to T6 lesion and completed RT on 5/22/24.  6/27/24: Manoj is here for follow up visit. She has h/o bilateral invasive lobular carcinoma of the breast, ER/ND positive and HER2/deana negative, status post bilateral mastectomy, bilateral sentinel lymph node biopsy, and right axillary lymph node dissection in 1/2013, status post adjuvant chemotherapy and adjuvant chest wall radiotherapy, Completed 10-year of endocrine therapy with Letrozole in 7/2023. She had CT AP in 2/2024 for abdominal pain which incidentally showed a lesion in T6. She had MRI spine on 3/11/24 which showed a 2.7 cm lesion within the left aspect of the T6 vertebral body and 7 mm marrow replacing lesion within the T11 vertebral body.  PET/CT on 3/28/24 showed Intense pathologic FDG uptake (SUV 9.3) coregistering with sclerotic lesion on the left side of T6 suspicious for biologic tumor activity.  She had CT guided biopsy on 4/17/24 which showed metastatic carcinoma consistent from breast primary. She saw Dr. Anaya and received palliative RT 2000cGy to T6 lesion and completed RT on 5/22/24. She started on Xeloda and had 1st week treatment. She had loose stool but otherwise tolerated well.   08/01/24: bilateral sentinel lymph node biopsy, and right axillary lymph node dissection in 1/2013, status post adjuvant chemotherapy and adjuvant chest wall radiotherapy, Completed 10-year of endocrine therapy with Letrozole in 7/2023. She had CT AP in 2/2024 for abdominal pain which incidentally showed a lesion in T6. She had MRI spine on 3/11/24 which showed a 2.7 cm lesion within the left aspect of the T6 vertebral body and 7 mm marrow replacing lesion within the T11 vertebral body.  PET/CT on 3/28/24 showed Intense pathologic FDG uptake (SUV 9.3) coregistering with sclerotic lesion on the left side of T6 suspicious for biologic tumor activity.  She had CT guided biopsy on 4/17/24 which showed metastatic carcinoma consistent from breast primary. She saw Dr. Anaya and received palliative RT 2000cGy to T6 lesion and completed RT on 5/22/24. She started on Xeloda and had 1st week of June 2024. She is complaining of persistence diarrhea despite Imodium intake, currently on Xeloda 3tabs every 12 hrs for one week on and one week off.   8/29/24: Manoj is here for follow up visit. She has h/o bilateral invasive lobular carcinoma of the breast, ER/ND positive and HER2/deana negative, status post bilateral mastectomy, bilateral sentinel lymph node biopsy, and right axillary lymph node dissection in 1/2013, status post adjuvant chemotherapy and adjuvant chest wall radiotherapy, Completed 10-year of endocrine therapy with Letrozole in 7/2023. She had CT AP in 2/2024 for abdominal pain which incidentally showed a lesion in T6. She had MRI spine on 3/11/24 which showed a 2.7 cm lesion within the left aspect of the T6 vertebral body and 7 mm marrow replacing lesion within the T11 vertebral body.  PET/CT on 3/28/24 showed Intense pathologic FDG uptake (SUV 9.3) coregistering with sclerotic lesion on the left side of T6 suspicious for biologic tumor activity.  She had CT guided biopsy on 4/17/24 which showed metastatic carcinoma consistent from breast primary. She saw Dr. Anaya and received palliative RT 2000cGy to T6 lesion and completed RT on 5/22/24. She has been on Xeloda and did not tolerate well. She complains diarrhea, weight loss and eye irritation.   10/10/24 Manoj is here for follow up visit. She has h/o bilateral invasive lobular carcinoma of the breast, ER/ND positive and HER2/deana negative, status post bilateral mastectomy, bilateral sentinel lymph node biopsy, and right axillary lymph node dissection in 1/2013, status post adjuvant chemotherapy and adjuvant chest wall radiotherapy, Completed 10-year of endocrine therapy with Letrozole in 7/2023. She had CT AP in 2/2024 for abdominal pain which incidentally showed a lesion in T6. She had MRI spine on 3/11/24 which showed a 2.7 cm lesion within the left aspect of the T6 vertebral body and 7 mm marrow replacing lesion within the T11 vertebral body.  PET/CT on 3/28/24 showed Intense pathologic FDG uptake (SUV 9.3) coregistering with sclerotic lesion on the left side of T6 suspicious for biologic tumor activity.  She had CT guided biopsy on 4/17/24 which showed metastatic carcinoma consistent from breast primary. She saw Dr. Anaya and received palliative RT 2000cGy to T6 lesion and completed RT on 5/22/24. She took Xeloda for 3 months and did not tolerate well. Xeloda was discontinued. She had a PET scan in 9.24 which showed interval resolution of uptake in T6 and New uptake in L1 and new pleural effussion. She completed XRT 5 fractions in 9.24.  She is here for follow up.  11/11/24 Manoj is here for follow up visit. She has h/o bilateral invasive lobular carcinoma of the breast, ER/ND positive and HER2/deana negative, status post bilateral mastectomy, bilateral sentinel lymph node biopsy, and right axillary lymph node dissection in 1/2013, status post adjuvant chemotherapy and adjuvant chest wall radiotherapy, Completed 10-year of endocrine therapy with Letrozole in 7/2023. She developed recurrent disease with a lesion in T6 in 2/2024. CT guided biopsy on 4/17/24 showed metastatic carcinoma consistent from breast primary. She saw Dr. Anaya and received palliative RT 2000cGy to T6 lesion and completed RT on 5/22/24. She took Xeloda for 3 months and did not tolerate well. Xeloda was discontinued. She had repeat PET/CT in 9.24 which showed interval resolution of uptake in T6 but new uptake in L1 and new pleural effusion. She completed XRT 5 fractions. She complains low back pain.   12/30/24 Manoj is here for follow up visit. Her spouse is with her. She has h/o bilateral invasive lobular carcinoma of the breast, ER/ND positive and HER2/deana negative, s/p bilateral mastectomy, bilateral sentinel lymph node biopsy, and right axillary lymph node dissection in 1/2013, s/p adjuvant chemotherapy and adjuvant chest wall radiotherapy, Completed 10-year of endocrine therapy with Letrozole in 7/2023. She developed recurrent disease with a lesion in T6 in 2/2024. CT guided biopsy on 4/17/24 showed metastatic carcinoma consistent from breast primary. She saw Dr. Anaya and received palliative RT 2000cGy to T6 lesion and completed RT on 5/22/24. She took Xeloda for 3 months and did not tolerate well. Xeloda was discontinued. She had repeat PET/CT in 9/16/24 which showed interval resolution of uptake in T6 but new uptake in L1 and new pleural effusion. MR Lumbar spine on 9/25/24 showed Infiltrative enhancing bone marrow signal abnormality within the T11 and L1 vertebral bodies most compatible with osseous metastatic disease. Mild compression deformity of L1, likely pathologic. Multilevel degenerative changes as described. She completed XRT 5 fractions on 10/1/24 -10/7/24. She has a history of fall on 9/2024. CT chest 12/12/24 showed no pleural effusion. She complains low back pain, taking Aleeve as needed with relief.  1/14/25: Manoj is here for follow up visit. Her spouse is with her. She has h/o bilateral invasive lobular carcinoma of the breast, ER/ND positive and HER2/deana negative, s/p bilateral mastectomy, bilateral sentinel lymph node biopsy, and right axillary lymph node dissection in 1/2013, s/p adjuvant chemotherapy and adjuvant chest wall radiotherapy, Completed 10-year of endocrine therapy with Letrozole in 7/2023. She developed recurrent disease with a lesion in T6 in 2/2024. CT guided biopsy on 4/17/24 showed metastatic carcinoma consistent from breast primary. She saw Dr. Anaya and received palliative RT 2000cGy to T6 lesion and completed RT on 5/22/24. She took Xeloda for 3 months and did not tolerate. Xeloda was discontinued. She had repeat PET/CT in 9/16/24 which showed interval resolution of uptake in T6 but new uptake in L1 and new pleural effusion.  She completed XRT 5 fractions on 10/1/24 -10/7/24. She has been taking Letrozole. She feels some aches and pains.   2/20/25 Manoj is here for follow up visit. Her spouse is with her. She has h/o bilateral invasive lobular carcinoma of the breast, ER/ND positive and HER2/deana negative, s/p bilateral mastectomy, bilateral sentinel lymph node biopsy, and right axillary lymph node dissection in 1/2013, s/p adjuvant chemotherapy and adjuvant chest wall radiotherapy, Completed 10-year of endocrine therapy with Letrozole in 7/2023. She developed recurrent disease with a lesion in T6 in 2/2024. CT guided biopsy on 4/17/24 showed metastatic carcinoma consistent from breast primary. She saw Dr. Anaya and received palliative RT 2000cGy to T6 lesion and completed RT on 5/22/24. She took Xeloda for 3 months and did not tolerate. Xeloda was discontinued. She had repeat PET/CT in 9/16/24 which showed interval resolution of uptake in T6 but new uptake in L1 and new pleural effusion. She completed XRT 5 fractions on 10/1/24 -10/7/24. She has been taking Letrozole. PET/CT on 1/7/25 showed several new foci of pathologic FDG uptake in bones, suspicious for biologic tumor activity. Interval resolution of previously noted small right pleural effusion. She feels some aches and pains. She started weekly Taxol, 2 weeks on and 1 week off on 1/31/25, s/p cycle #1 and is tolerating well except for diarrhea yesterday, managed with Imodium. She denies nausea, vomiting, or numbness or tingling of hands/feet. She saw Dr Taylor and is planned for L1 kyphoplasty on 3/18/25.  3/13/25 Manoj is here for follow up visit. Her spouse is with her. She has h/o bilateral invasive lobular carcinoma of the breast, ER/ND positive and HER2/deana negative, s/p bilateral mastectomy, bilateral sentinel lymph node biopsy, and right axillary lymph node dissection in 1/2013, s/p adjuvant chemotherapy and adjuvant chest wall radiotherapy, Completed 10-year of endocrine therapy with Letrozole in 7/2023. She developed recurrent disease with a lesion in T6 in 2/2024. CT guided biopsy on 4/17/24 showed metastatic carcinoma consistent from breast primary. She saw Dr. Anaya and received palliative RT 2000cGy to T6 lesion and completed RT on 5/22/24. She took Xeloda for 3 months and did not tolerate. Xeloda was discontinued. She had repeat PET/CT in 9/16/24 which showed interval resolution of uptake in T6 but new uptake in L1 and new pleural effusion. She completed XRT 5 fractions on 10/1/24 -10/7/24. She has been taking Letrozole. PET/CT on 1/7/25 showed several new foci of pathologic FDG uptake in bones, suspicious for biologic tumor activity. She started weekly Taxol, 2 weeks on and 1 week off on 1/31/25, s/p 2 cycles and tolerated.    4/3/25 Manoj is here for follow up visit. Her spouse is with her. She has h/o bilateral invasive lobular carcinoma of the breast, ER/ND positive and HER2/deana negative, s/p bilateral mastectomy, bilateral sentinel lymph node biopsy, and right axillary lymph node dissection in 1/2013, s/p adjuvant chemotherapy and adjuvant chest wall radiotherapy, Completed 10-year of endocrine therapy with Letrozole in 7/2023. She developed recurrent disease with a lesion in T6 in 2/2024. CT guided biopsy on 4/17/24 showed metastatic carcinoma consistent from breast primary. She saw Dr. Anaya and received palliative RT 2000cGy to T6 lesion and completed RT on 5/22/24. She took Xeloda for 3 months and did not tolerate. Xeloda was discontinued. She had repeat PET/CT in 9/16/24 which showed interval resolution of uptake in T6 but new uptake in L1 and new pleural effusion. She completed XRT 5 fractions on 10/1/24 -10/7/24. She was taking Letrozole. PET/CT on 1/7/25 showed several new foci of pathologic FDG uptake in bones, suspicious for biologic tumor activity. She started weekly Taxol, 2 weeks on and 1 week off on 1/31/25, s/p 3 cycles and tolerated. She feels some numbness and tingling in her hands and feet. She does not have n/v/d, mouth sore, chill or fever.    4/16/25 Manoj is here for follow up visit. Her spouse is with her. She has h/o bilateral invasive lobular carcinoma of the breast, ER/ND positive and HER2/deana negative, s/p bilateral mastectomy, bilateral sentinel lymph node biopsy, and right axillary lymph node dissection in 1/2013, s/p adjuvant chemotherapy and adjuvant chest wall radiotherapy, Completed 10-year of endocrine therapy with Letrozole in 7/2023. She developed recurrent disease with a lesion in T6 in 2/2024. CT guided biopsy on 4/17/24 showed metastatic carcinoma consistent from breast primary. She saw Dr. Anaya and received palliative RT 2000cGy to T6 lesion and completed RT on 5/22/24. She took Xeloda for 3 months and did not tolerate. Xeloda was discontinued. She had repeat PET/CT in 9/16/24 which showed interval resolution of uptake in T6 but new uptake in L1 and new pleural effusion. She completed XRT 5 fractions on 10/1/24 -10/7/24. She was taking Letrozole. PET/CT on 1/7/25 showed several new foci of pathologic FDG uptake in bones, suspicious for biologic tumor activity. She started weekly Taxol, 2 weeks on and 1 week off on 1/31/25, s/p 3 cycles and tolerated. She feels some numbness and tingling in her hands and feet. She does not have n/v/d, mouth sore, chill or fever.    4/30/25 Manoj is here for follow up visit and chemotherapy. Her  is with her. She has h/o bilateral invasive lobular carcinoma of the breast, ER/ND positive and HER2/deana negative, s/p bilateral mastectomy, bilateral sentinel lymph node biopsy, and right axillary lymph node dissection in 1/2013, s/p adjuvant chemotherapy and adjuvant chest wall radiotherapy, Completed 10-year of endocrine therapy with Letrozole in 7/2023. She developed recurrent disease with a lesion in T6 in 2/2024. CT guided biopsy on 4/17/24 showed metastatic carcinoma consistent from breast primary. She received palliative RT 2000cGy to T6 lesion and completed RT on 5/22/24. She took Xeloda for 3 months and did not tolerate. Xeloda was discontinued. She had repeat PET/CT in 9/16/24 which showed interval resolution of uptake in T6 but new uptake in L1 and new pleural effusion. She completed XRT 5 fractions on 10/1/24 -10/7/24. She was taking Letrozole. PET/CT on 1/7/25 showed several new foci of pathologic FDG uptake in bones, suspicious for biologic tumor activity. She started weekly Taxol, 2 weeks on and 1 week off on 1/31/25, s/p 4 cycles and tolerated. She has mild neuropathy symptoms. She does not have n/v/d, mouth sore, chill or fever.    5/21/25 Manoj is here for follow up visit and chemotherapy. Her  is with her. She has h/o bilateral invasive lobular carcinoma of the breast, ER/ND positive and HER2/deana negative, s/p bilateral mastectomy, bilateral sentinel lymph node biopsy, and right axillary lymph node dissection in 1/2013, s/p adjuvant chemotherapy and adjuvant chest wall radiotherapy, Completed 10-year of endocrine therapy with Letrozole in 7/2023. She developed recurrent disease with a lesion in T6 in 2/2024. CT guided biopsy on 4/17/24 showed metastatic carcinoma consistent from breast primary, triple negative. She received palliative RT 2000cGy to T6 lesion and completed RT on 5/22/24. She took Xeloda for 3 months and did not tolerate. Xeloda was discontinued. She had repeat PET/CT in 9/16/24 which showed interval resolution of uptake in T6 but new uptake in L1 and new pleural effusion. She completed XRT 5 fractions on 10/1/24 -10/7/24. She was taking Letrozole. PET/CT on 1/7/25 showed several new foci of pathologic FDG uptake in bones, suspicious for biologic tumor activity. She started weekly Taxol, 2 weeks on and 1 week off on 1/31/25, s/p 5 cycles and is tolerating. She feels neuropathy which is getting worse on the right foot. She had explosive diarrhea last Sunday which resolved after taking Imodium. She gets intermittent back pain, managed with Aleeve. She denies nausea, vomiting, fever or chills.  6/11/25 Manoj is here for follow up visit and chemotherapy. Her  is with her. She has h/o bilateral invasive lobular carcinoma of the breast, ER/ND positive and HER2/deana negative, s/p bilateral mastectomy, bilateral sentinel lymph node biopsy, and right axillary lymph node dissection in 1/2013, s/p adjuvant chemotherapy and adjuvant chest wall radiotherapy, Completed 10-year of endocrine therapy with Letrozole in 7/2023. She developed recurrent disease with a lesion in T6 in 2/2024. CT guided biopsy on 4/17/24 showed metastatic carcinoma consistent from breast primary, triple negative. She received palliative RT 2000cGy to T6 lesion and completed RT on 5/22/24. She took Xeloda for 3 months and did not tolerate. Xeloda was discontinued. She had repeat PET/CT in 9/16/24 which showed interval resolution of uptake in T6 but new uptake in L1 and new pleural effusion. She completed XRT 5 fractions on 10/1/24 -10/7/24. She was taking Letrozole. PET/CT on 1/7/25 showed several new foci of pathologic FDG uptake in bones, suspicious for biologic tumor activity. She started weekly Taxol, 2 weeks on and 1 week off on 1/31/25, s/p 6 cycles and is tolerating. She feels neuropathy which is getting worse on the right foot. She had explosive diarrhea last Sunday which resolved after taking Imodium. She gets intermittent back pain, managed with Aleeve. She denies nausea, vomiting, fever or chills.  7/02/25 Manoj is here for follow up visit and chemotherapy. Her  is with her. She has h/o bilateral invasive lobular carcinoma of the breast, ER/ND positive and HER2/deana negative, s/p bilateral mastectomy, bilateral sentinel lymph node biopsy, and right axillary lymph node dissection in 1/2013, s/p adjuvant chemotherapy and adjuvant chest wall radiotherapy, completed 10-year of endocrine therapy with Letrozole in 7/2023. She developed recurrent disease with a lesion in T6 in 2/2024. CT guided biopsy on 4/17/24 showed metastatic carcinoma consistent from breast primary, triple negative. She received palliative RT 2000cGy to T6 lesion and completed RT on 5/22/24. She took Xeloda for 3 months and did not tolerate. Xeloda was discontinued. She had repeat PET/CT in 9/16/24 which showed interval resolution of uptake in T6 but new uptake in L1 and new pleural effusion. She completed XRT 5 fractions on 10/1/24 -10/7/24. She was taking Letrozole. PET/CT on 1/7/25 showed several new foci of pathologic FDG uptake in bones, suspicious for biologic tumor activity. She started weekly Taxol, 2 weeks on and 1 week off on 1/31/25, s/p 7 cycles and is tolerating. She has neuropathy on her feet, refused to take Gabapentin. She gets intermittent back pain, managed with Aleeve. She denies nausea, vomiting, fever or chills.  7/23/25 Manoj is here for follow up visit and chemotherapy. Her  is with her. She has h/o bilateral invasive lobular carcinoma of the breast, ER/ND positive and HER2/deana negative, s/p bilateral mastectomy, bilateral sentinel lymph node biopsy, and right axillary lymph node dissection in 1/2013, s/p adjuvant chemotherapy and adjuvant chest wall radiotherapy, completed 10-year of endocrine therapy with Letrozole in 7/2023. She developed recurrent disease with a lesion in T6 in 2/2024. CT guided biopsy on 4/17/24 showed metastatic carcinoma consistent from breast primary, triple negative. She received palliative RT 2000cGy to T6 lesion and completed RT on 5/22/24. She took Xeloda for 3 months and did not tolerate. Xeloda was discontinued. She had repeat PET/CT in 9/16/24 which showed interval resolution of uptake in T6 but new uptake in L1 and new pleural effusion. She completed XRT 5 fractions on 10/1/24 -10/7/24. She was taking Letrozole. PET/CT on 1/7/25 showed several new foci of pathologic FDG uptake in bones, suspicious for biologic tumor activity. She started weekly Taxol, 2 weeks on and 1 week off on 1/31/25, s/p 7 cycles and is tolerating. She has neuropathy on her feet, refused to take Gabapentin. She gets intermittent back pain, managed with Aleeve. She denies nausea, vomiting, fever or chills.

## 2025-07-16 NOTE — ASSESSMENT
[FreeTextEntry1] : Assessment and Plan: # H/O Bilateral invasive lobular carcinoma of the breast, ER/MA positive and HER2/deana negative, status post bilateral mastectomy, bilateral sentinel lymph node biopsy, and right axillary lymph node dissection, status post adjuvant chemotherapy and adjuvant chest wall radiotherapy, Completed 10-year of endocrine therapy with Letrozole in 7/2023.  - S/P b/l mastectomy. There is no palpable abnormality.  # Recurrent breast cancer(ER/MA/Her 2 negative Biopsy proven) with T6 vertebral body lesion S/P XRT(5/24) now presenting with new L1 lesion S/P XRT  9.24 - MRI spine on 3/11/24 showed a 2.7 cm lesion within the left aspect of the T6 vertebral body and 7 mm marrow replacing lesion within the T11 vertebral body.  - PET/CT on 3/28/24 showed Intense pathologic FDG uptake (SUV 9.3) coregistering with sclerotic lesion on the left side of T6 suspicious for biologic tumor activity. No other sites of abnormal FDG uptake.  - CT guided core bx of the lesion in T6 vertebral body on 4/17/24 showed metastatic carcinoma, favor breast primary, ER/MA negative. Her2 is negative.  - S/P palliative RT 2000cGy to T6 lesion and completed RT on 5/22/24. - She took Xeloda for 3 months, 1500 mg twice a day, one week on and one week off. Due to frequent diarrhea, dose reduced to Xeloda 1000 mg q12h, 1 week on and 1 week off.  She developed multiple side effects and Xeloda was discontinued.  -- PET/CT in 9/16/24 showed interval resolution of uptake in T6 and New uptake in L1 and new pleural effusion.  -- MRI of lumbar spine 9/25/24 showed infiltrative enhancing bone marrow signal abnormality within the T11 and L1 vertebral bodies most compatible with osseous metastatic disease. Mild compression deformity of L1, likely pathologic. -- She received XRT 5 fractions 2000cGy to Vertebrae, Lumbar on 10/1/24 - 10/7/24. -- Recurrent breast cancer in the spine is triple negative. Since her initial breast cancer was hormone receptor and bone met biopsy could have inadequate IHC staining for hormone receptor, she was given Letrozole.  -- Repeat PET/CT on 1/7/25 showed several new foci of pathologic FDG uptake in bones, suspicious for biologic tumor activity. Interval resolution of previously noted small right pleural effusion. -- In light of progression of disease, she switched to Taxol 80 mg weekly, 2 weeks on and one week off. S/P 7 cycles. Repeat PET/CT on 4/23/25 showed stable bony mets with SUV values trending down in some areas and trending up slightly in the other areas. There is no visceral mets.  -- Will proceed with 8th cycle of weekly Taxol, 2 weeks on and 1 week off. CBC today reviewed and is adequate. Mild leucopenia with normal ANC and mild anemia noted, will monitor. -- Will repeat PET scan to eval treatment response, script given. -- Will order liquid NGS and PD-L1 to look for additional treatment options.  -- Continue X-Geva injection monthly. Dental clearance was done.  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 Emphasized to take Gabapentin.  -- Mild elevated AST and ALT. Resolved. Will continue monitoring.  -- Order blood work: CBC, BMP, LFT, Mg, CEA and .  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 She saw neurosurgeon.   #Low back pain.  -- S/P L1 kyphoplasty on 4/11/25. Pain has reduced.  -- NSAIDs as needed.  -- F/U with Neurosurgery, Dr Taylor as scheduled.  RTO in 3 weeks. Case was seen and discussed with Dr. Tovar who agreed with assessment and plan.

## 2025-07-16 NOTE — HISTORY OF PRESENT ILLNESS
[de-identified] : This 68-year-old female is here today for a followup visit.  She has a history of bilateral invasive lobular carcinoma, stage IIIA (pT1c N2a M0) in the right breast, and stage IA (pT1b N0 M0) in the left breast.  Both sided breast cancer were ER/CO positive and HER2/deana negative.  She had bilateral mastectomy, bilateral sentinel lymph node biopsy, and right axillary lymph node dissection at [facility] on 01/08/2013.  She received adjuvant chemotherapy with Adriamycin and Cytoxan followed by paclitaxel.  She also received post mastectomy adjuvant radiotherapy to right side chest wall.  She has been on adjuvant endocrine therapy with Femara since 7/2013  and has been tolerating well.  She had genetic test for BRCA mutations.  She is negative for BRCA1/2 mutations.  In 10/2014, she had a bone density at [facility], which showed osteopenia. Her latest bone density was done 7/21/16 which showed osteopenia. MRI of breasts was done on 1/30/17 which showed no MR evidence of malignancy in either breast. Recommendation: Unless otherwise indicated by clinical findings, annual screening mammography recommended. This can be alternated at 6 month intervals with bilateral screening breast MRI.\par   She has been taking calcium and vitamin D supplements. Latest colonoscopy 11/2016 pt states it was negative. Saw Dr. Garcia 1/2017.\par  She saw Dr. Rankin recently. She is scheduled to have a revision of her lower abdominal fat necrosis on 1/2018.\par  \par   [de-identified] : 6/8/18: She stopped letrozole few months ago as she was found to be in Afib by her cardiologist on Pre-op clearance for abdominal fat necrosis. However her cardiologist () told her that you can resume Letrozole. She had surgery for abdominal fat necrosis and she is going for surgery for incisional hernia by .   12/13/18: The patient is here for followup visit. She has been taking Letrozole daily (since 7/2013) and tolerating well. She had hernia repair surgery. She complains some pain in her knees. She had MRI breast in 1 2017. There was no suspicious finding.  6/14/19; The patient is here for followup visit. She has been taking Letrozole daily (since 7/2013) and tolerating well. She complains some pain in her knees. She does not have other new complains. She has mild elevated calcium. PHT was normal.  12/13/2019 :  The patient is here for follow up. She has been on letrozole daily and tolerating well. She had a bone density 12/2/109 which showed osteopenia of femur and hip. She is not taking calcium and vit D since her last calcium was elevated 10.7. She had a recent US breasts 12/2019 for breast pain which was negative. She was seen by her cardiologist and had blood work in the summer. She was told that she has severe anemia. She started her on iron pills. She denies any vaginal bleeding or rectal bleeding  6/12/2020: The patient is here for follow up. She had bilateral invasive lobular carcinoma of the breast, ER/NH positive and HER2/deana negative, status post bilateral mastectomy, bilateral sentinel lymph node biopsy, and right axillary lymph node dissection in 1/2013, status post adjuvant chemotherapy and adjuvant chest wall radiotherapy, currently on adjuvant endocrine therapy with letrozole and tolerating well. She had a bone density 12/2/19 which showed osteopenia of femur and hip. She is not taking calcium and vit D since her last calcium was elevated 10.7. She had a recent US breasts 12/2019 for breast pain which was negative. She does not have new complains.  12/02/2020: MANOJ is here for follow up visit for bilateral invasive lobular carcinoma of the breast, ER/NH positive and HER2/deana negative, status post bilateral mastectomy, bilateral sentinel lymph node biopsy, and right axillary lymph node dissection in 1/2013, status post adjuvant chemotherapy and adjuvant chest wall radiotherapy, currently on adjuvant endocrine therapy with letrozole and tolerating well besides mild arthralgia. She had a bone density 12/2/19 which showed osteopenia of femur and hip. She continues on Vitamin D daily. She denies any new breast symptoms at this time. In addition, patient was diagnosed with COVID in 3/2020.   06/02/2021: MANOJ is here for follow up visit for bilateral invasive lobular carcinoma of the breast, ER/NH positive and HER2/deaan negative, status post bilateral mastectomy, bilateral sentinel lymph node biopsy, and right axillary lymph node dissection in 1/2013, status post adjuvant chemotherapy and adjuvant chest wall radiotherapy, currently on adjuvant endocrine therapy with letrozole since 7/2013 and tolerating well besides mild arthralgia. She had a bone density 12/2/19 which showed osteopenia of femur and hip. She continues on Vitamin D daily. She denies any new breast symptoms at this time. she had right breast skin tag biopsied by dermatologist; which was benign.   1/5/22 MANOJ is here for follow up visit for bilateral invasive lobular carcinoma of the breast, ER/NH positive and HER2/deana negative, status post bilateral mastectomy, bilateral sentinel lymph node biopsy, and right axillary lymph node dissection in 1/2013, status post adjuvant chemotherapy and adjuvant chest wall radiotherapy, currently on adjuvant endocrine therapy with letrozole since 7/2013 and tolerating well besides mild arthralgia. She had a bone density 12/7/21 which showed worsening osteopenia of femur and hip, T score -2.4. She is not complaint with Vitamin D, does not take calcium due to mildly elevated Calcium. She denies any new breast symptoms at this time. She was experiencing lower abdominal and pelvic pain in Sept had US which was unremarkable. In addition, had Thyroid US due to hypercalcemia which was unremarkable, In 8/2021 TSH 2.36, Calcium 10, and PTH was 78.   7/21/22: Manoj is here for follow up visit for bilateral invasive lobular carcinoma of the breast, ER/NH positive and HER2/deana negative, status post bilateral mastectomy, bilateral sentinel lymph node biopsy, and right axillary lymph node dissection in 1/2013, status post adjuvant chemotherapy and adjuvant chest wall radiotherapy, currently on adjuvant endocrine therapy with letrozole since 7/2013 and tolerating well besides mild arthralgia. She had a bone density 12/7/21 which showed worsening osteopenia of femur and hip, T score -2.4. She takes Vitamin D but not calcium because her calcium has been mildly elevated. She was found to have large hiatal hernia and she has had on and off diarrhea for long time. She has been seen by Dr. Borjas.   01/26/2023 Manoj is here for follow up visit for bilateral invasive lobular carcinoma of the breast, ER/NH positive and HER2/deana negative, status post bilateral mastectomy, bilateral sentinel lymph node biopsy, and right axillary lymph node dissection in 1/2013, status post adjuvant chemotherapy and adjuvant chest wall radiotherapy, currently on adjuvant endocrine therapy with letrozole since 7/2013 and tolerating well besides mild arthralgia. She had a bone density 12/7/21 which showed worsening osteopenia of femur and hip, T score -2.4. She takes Vitamin D but not calcium because her calcium has been mildly elevated. She was found to have large hiatal hernia and surgery on 11/21/22. Her Hb drooped to 9.5 g/dl after surgery. She was found to have high serum calcium but repeat calcium was normal. PTH was also within normal limit.  7/20/23 Manoj is here for follow up visit for bilateral invasive lobular carcinoma of the breast, ER/NH positive and HER2/deana negative, status post bilateral mastectomy, bilateral sentinel lymph node biopsy, and right axillary lymph node dissection in 1/2013, status post adjuvant chemotherapy and adjuvant chest wall radiotherapy, currently on adjuvant endocrine therapy with letrozole since 7/2013 and tolerating well besides mild arthralgia. She had a bone density 12/7/21 which showed worsening osteopenia of femur and hip, T score -2.4. She takes Vitamin D but not calcium because her calcium has been mildly elevated. Repeat serum calcium normal, MM panel normal. She was found to have large hiatal hernia and surgery on 11/21/22. Her Hb drooped to 9.5 g/dl after surgery, now normal. After hernia repair she was having symptoms consistent with vagal nerve mediated gastroparesis, she then had endoscopic pyloromyotomy, symptoms resolved.   1/25/2024: Manoj is here for follow up visit for bilateral invasive lobular carcinoma of the breast, ER/NH positive and HER2/deana negative, status post bilateral mastectomy, bilateral sentinel lymph node biopsy, and right axillary lymph node dissection in 1/2013, status post adjuvant chemotherapy and adjuvant chest wall radiotherapy, currently on adjuvant endocrine therapy with letrozole since 7/2013 and tolerating well besides mild arthralgia. She Completed 10 years treatment of Letrozole in 7/2023. She had a bone density 12/8/23 Showed:         ----------------------------------------------------------------- AP Spine (L1-L4)         0.998   -0.4      1.8     Normal Femoral Neck (Left) 0.552   -2.7     -0.7     Osteoporosis Total Hip (Left) 0.676   -2.2     -0.6     Osteopenia -which showed Osteoporosis of femur Neck and Osteopenia in the Hip. She takes Vitamin D.  After hernia repair, she was having symptoms consistent with vagal nerve mediated gastroparesis, she then had endoscopic pyloromyotomy, symptoms resolved.  She starts feeling better now.   3/29/24: Manoj is here for follow up visit. She has h/o bilateral invasive lobular carcinoma of the breast, ER/NH positive and HER2/deana negative, status post bilateral mastectomy, bilateral sentinel lymph node biopsy, and right axillary lymph node dissection in 1/2013, status post adjuvant chemotherapy and adjuvant chest wall radiotherapy, currently on adjuvant endocrine therapy with letrozole since 7/2013. She had CT AP in 2/2024 for abdominal pain which incidentally showed a lesion in T6. She had MRI spine on 3/11/24 which showed a 2.7 cm lesion within the left aspect of the T6 vertebral body and 7 mm marrow replacing lesion within the T11 vertebral body.  PET/CT on 3/28/24 showed Intense pathologic FDG uptake (SUV 9.3) coregistering with sclerotic lesion on the left side of T6 suspicious for biologic tumor activity. No other sites of abnormal FDG uptake. She is here today to discuss further management plan.  4/29/24: Manoj is here for follow up visit. She has h/o bilateral invasive lobular carcinoma of the breast, ER/NH positive and HER2/deana negative, status post bilateral mastectomy, bilateral sentinel lymph node biopsy, and right axillary lymph node dissection in 1/2013, status post adjuvant chemotherapy and adjuvant chest wall radiotherapy, Completed 10-year of endocrine therapy with Letrozole in 7/2023. She had CT AP in 2/2024 for abdominal pain which incidentally showed a lesion in T6. She had MRI spine on 3/11/24 which showed a 2.7 cm lesion within the left aspect of the T6 vertebral body and 7 mm marrow replacing lesion within the T11 vertebral body.  PET/CT on 3/28/24 showed Intense pathologic FDG uptake (SUV 9.3) coregistering with sclerotic lesion on the left side of T6 suspicious for biologic tumor activity. No other sites of abnormal FDG uptake. She had CT guided biopsy on 4/17/24 and is here today to discuss the result.     5/30/24: Manoj is here for follow up visit. She has h/o bilateral invasive lobular carcinoma of the breast, ER/NH positive and HER2/deana negative, status post bilateral mastectomy, bilateral sentinel lymph node biopsy, and right axillary lymph node dissection in 1/2013, status post adjuvant chemotherapy and adjuvant chest wall radiotherapy, Completed 10-year of endocrine therapy with Letrozole in 7/2023. She had CT AP in 2/2024 for abdominal pain which incidentally showed a lesion in T6. She had MRI spine on 3/11/24 which showed a 2.7 cm lesion within the left aspect of the T6 vertebral body and 7 mm marrow replacing lesion within the T11 vertebral body.  PET/CT on 3/28/24 showed Intense pathologic FDG uptake (SUV 9.3) coregistering with sclerotic lesion on the left side of T6 suspicious for biologic tumor activity.  She had CT guided biopsy on 4/17/24 which showed metastatic carcinoma consistent from breast primary. She saw Dr. Anaya and received palliative RT 2000cGy to T6 lesion and completed RT on 5/22/24.  6/27/24: Manoj is here for follow up visit. She has h/o bilateral invasive lobular carcinoma of the breast, ER/NH positive and HER2/deana negative, status post bilateral mastectomy, bilateral sentinel lymph node biopsy, and right axillary lymph node dissection in 1/2013, status post adjuvant chemotherapy and adjuvant chest wall radiotherapy, Completed 10-year of endocrine therapy with Letrozole in 7/2023. She had CT AP in 2/2024 for abdominal pain which incidentally showed a lesion in T6. She had MRI spine on 3/11/24 which showed a 2.7 cm lesion within the left aspect of the T6 vertebral body and 7 mm marrow replacing lesion within the T11 vertebral body.  PET/CT on 3/28/24 showed Intense pathologic FDG uptake (SUV 9.3) coregistering with sclerotic lesion on the left side of T6 suspicious for biologic tumor activity.  She had CT guided biopsy on 4/17/24 which showed metastatic carcinoma consistent from breast primary. She saw Dr. Anaya and received palliative RT 2000cGy to T6 lesion and completed RT on 5/22/24. She started on Xeloda and had 1st week treatment. She had loose stool but otherwise tolerated well.   08/01/24: bilateral sentinel lymph node biopsy, and right axillary lymph node dissection in 1/2013, status post adjuvant chemotherapy and adjuvant chest wall radiotherapy, Completed 10-year of endocrine therapy with Letrozole in 7/2023. She had CT AP in 2/2024 for abdominal pain which incidentally showed a lesion in T6. She had MRI spine on 3/11/24 which showed a 2.7 cm lesion within the left aspect of the T6 vertebral body and 7 mm marrow replacing lesion within the T11 vertebral body.  PET/CT on 3/28/24 showed Intense pathologic FDG uptake (SUV 9.3) coregistering with sclerotic lesion on the left side of T6 suspicious for biologic tumor activity.  She had CT guided biopsy on 4/17/24 which showed metastatic carcinoma consistent from breast primary. She saw Dr. Anaya and received palliative RT 2000cGy to T6 lesion and completed RT on 5/22/24. She started on Xeloda and had 1st week of June 2024. She is complaining of persistence diarrhea despite Imodium intake, currently on Xeloda 3tabs every 12 hrs for one week on and one week off.   8/29/24: Manoj is here for follow up visit. She has h/o bilateral invasive lobular carcinoma of the breast, ER/NH positive and HER2/deana negative, status post bilateral mastectomy, bilateral sentinel lymph node biopsy, and right axillary lymph node dissection in 1/2013, status post adjuvant chemotherapy and adjuvant chest wall radiotherapy, Completed 10-year of endocrine therapy with Letrozole in 7/2023. She had CT AP in 2/2024 for abdominal pain which incidentally showed a lesion in T6. She had MRI spine on 3/11/24 which showed a 2.7 cm lesion within the left aspect of the T6 vertebral body and 7 mm marrow replacing lesion within the T11 vertebral body.  PET/CT on 3/28/24 showed Intense pathologic FDG uptake (SUV 9.3) coregistering with sclerotic lesion on the left side of T6 suspicious for biologic tumor activity.  She had CT guided biopsy on 4/17/24 which showed metastatic carcinoma consistent from breast primary. She saw Dr. Anaya and received palliative RT 2000cGy to T6 lesion and completed RT on 5/22/24. She has been on Xeloda and did not tolerate well. She complains diarrhea, weight loss and eye irritation.   10/10/24 Manoj is here for follow up visit. She has h/o bilateral invasive lobular carcinoma of the breast, ER/NH positive and HER2/deana negative, status post bilateral mastectomy, bilateral sentinel lymph node biopsy, and right axillary lymph node dissection in 1/2013, status post adjuvant chemotherapy and adjuvant chest wall radiotherapy, Completed 10-year of endocrine therapy with Letrozole in 7/2023. She had CT AP in 2/2024 for abdominal pain which incidentally showed a lesion in T6. She had MRI spine on 3/11/24 which showed a 2.7 cm lesion within the left aspect of the T6 vertebral body and 7 mm marrow replacing lesion within the T11 vertebral body.  PET/CT on 3/28/24 showed Intense pathologic FDG uptake (SUV 9.3) coregistering with sclerotic lesion on the left side of T6 suspicious for biologic tumor activity.  She had CT guided biopsy on 4/17/24 which showed metastatic carcinoma consistent from breast primary. She saw Dr. Anaya and received palliative RT 2000cGy to T6 lesion and completed RT on 5/22/24. She took Xeloda for 3 months and did not tolerate well. Xeloda was discontinued. She had a PET scan in 9.24 which showed interval resolution of uptake in T6 and New uptake in L1 and new pleural effussion. She completed XRT 5 fractions in 9.24.  She is here for follow up.  11/11/24 Manoj is here for follow up visit. She has h/o bilateral invasive lobular carcinoma of the breast, ER/NH positive and HER2/deana negative, status post bilateral mastectomy, bilateral sentinel lymph node biopsy, and right axillary lymph node dissection in 1/2013, status post adjuvant chemotherapy and adjuvant chest wall radiotherapy, Completed 10-year of endocrine therapy with Letrozole in 7/2023. She developed recurrent disease with a lesion in T6 in 2/2024. CT guided biopsy on 4/17/24 showed metastatic carcinoma consistent from breast primary. She saw Dr. Anaya and received palliative RT 2000cGy to T6 lesion and completed RT on 5/22/24. She took Xeloda for 3 months and did not tolerate well. Xeloda was discontinued. She had repeat PET/CT in 9.24 which showed interval resolution of uptake in T6 but new uptake in L1 and new pleural effusion. She completed XRT 5 fractions. She complains low back pain.   12/30/24 Manoj is here for follow up visit. Her spouse is with her. She has h/o bilateral invasive lobular carcinoma of the breast, ER/NH positive and HER2/deana negative, s/p bilateral mastectomy, bilateral sentinel lymph node biopsy, and right axillary lymph node dissection in 1/2013, s/p adjuvant chemotherapy and adjuvant chest wall radiotherapy, Completed 10-year of endocrine therapy with Letrozole in 7/2023. She developed recurrent disease with a lesion in T6 in 2/2024. CT guided biopsy on 4/17/24 showed metastatic carcinoma consistent from breast primary. She saw Dr. Anaya and received palliative RT 2000cGy to T6 lesion and completed RT on 5/22/24. She took Xeloda for 3 months and did not tolerate well. Xeloda was discontinued. She had repeat PET/CT in 9/16/24 which showed interval resolution of uptake in T6 but new uptake in L1 and new pleural effusion. MR Lumbar spine on 9/25/24 showed Infiltrative enhancing bone marrow signal abnormality within the T11 and L1 vertebral bodies most compatible with osseous metastatic disease. Mild compression deformity of L1, likely pathologic. Multilevel degenerative changes as described. She completed XRT 5 fractions on 10/1/24 -10/7/24. She has a history of fall on 9/2024. CT chest 12/12/24 showed no pleural effusion. She complains low back pain, taking Aleeve as needed with relief.  1/14/25: Manoj is here for follow up visit. Her spouse is with her. She has h/o bilateral invasive lobular carcinoma of the breast, ER/NH positive and HER2/deana negative, s/p bilateral mastectomy, bilateral sentinel lymph node biopsy, and right axillary lymph node dissection in 1/2013, s/p adjuvant chemotherapy and adjuvant chest wall radiotherapy, Completed 10-year of endocrine therapy with Letrozole in 7/2023. She developed recurrent disease with a lesion in T6 in 2/2024. CT guided biopsy on 4/17/24 showed metastatic carcinoma consistent from breast primary. She saw Dr. Anaya and received palliative RT 2000cGy to T6 lesion and completed RT on 5/22/24. She took Xeloda for 3 months and did not tolerate. Xeloda was discontinued. She had repeat PET/CT in 9/16/24 which showed interval resolution of uptake in T6 but new uptake in L1 and new pleural effusion.  She completed XRT 5 fractions on 10/1/24 -10/7/24. She has been taking Letrozole. She feels some aches and pains.   2/20/25 Manoj is here for follow up visit. Her spouse is with her. She has h/o bilateral invasive lobular carcinoma of the breast, ER/NH positive and HER2/deana negative, s/p bilateral mastectomy, bilateral sentinel lymph node biopsy, and right axillary lymph node dissection in 1/2013, s/p adjuvant chemotherapy and adjuvant chest wall radiotherapy, Completed 10-year of endocrine therapy with Letrozole in 7/2023. She developed recurrent disease with a lesion in T6 in 2/2024. CT guided biopsy on 4/17/24 showed metastatic carcinoma consistent from breast primary. She saw Dr. Anaya and received palliative RT 2000cGy to T6 lesion and completed RT on 5/22/24. She took Xeloda for 3 months and did not tolerate. Xeloda was discontinued. She had repeat PET/CT in 9/16/24 which showed interval resolution of uptake in T6 but new uptake in L1 and new pleural effusion. She completed XRT 5 fractions on 10/1/24 -10/7/24. She has been taking Letrozole. PET/CT on 1/7/25 showed several new foci of pathologic FDG uptake in bones, suspicious for biologic tumor activity. Interval resolution of previously noted small right pleural effusion. She feels some aches and pains. She started weekly Taxol, 2 weeks on and 1 week off on 1/31/25, s/p cycle #1 and is tolerating well except for diarrhea yesterday, managed with Imodium. She denies nausea, vomiting, or numbness or tingling of hands/feet. She saw Dr Taylor and is planned for L1 kyphoplasty on 3/18/25.  3/13/25 Manoj is here for follow up visit. Her spouse is with her. She has h/o bilateral invasive lobular carcinoma of the breast, ER/NH positive and HER2/deana negative, s/p bilateral mastectomy, bilateral sentinel lymph node biopsy, and right axillary lymph node dissection in 1/2013, s/p adjuvant chemotherapy and adjuvant chest wall radiotherapy, Completed 10-year of endocrine therapy with Letrozole in 7/2023. She developed recurrent disease with a lesion in T6 in 2/2024. CT guided biopsy on 4/17/24 showed metastatic carcinoma consistent from breast primary. She saw Dr. Anaya and received palliative RT 2000cGy to T6 lesion and completed RT on 5/22/24. She took Xeloda for 3 months and did not tolerate. Xeloda was discontinued. She had repeat PET/CT in 9/16/24 which showed interval resolution of uptake in T6 but new uptake in L1 and new pleural effusion. She completed XRT 5 fractions on 10/1/24 -10/7/24. She has been taking Letrozole. PET/CT on 1/7/25 showed several new foci of pathologic FDG uptake in bones, suspicious for biologic tumor activity. She started weekly Taxol, 2 weeks on and 1 week off on 1/31/25, s/p 2 cycles and tolerated.    4/3/25 Manoj is here for follow up visit. Her spouse is with her. She has h/o bilateral invasive lobular carcinoma of the breast, ER/NH positive and HER2/deana negative, s/p bilateral mastectomy, bilateral sentinel lymph node biopsy, and right axillary lymph node dissection in 1/2013, s/p adjuvant chemotherapy and adjuvant chest wall radiotherapy, Completed 10-year of endocrine therapy with Letrozole in 7/2023. She developed recurrent disease with a lesion in T6 in 2/2024. CT guided biopsy on 4/17/24 showed metastatic carcinoma consistent from breast primary. She saw Dr. Anaya and received palliative RT 2000cGy to T6 lesion and completed RT on 5/22/24. She took Xeloda for 3 months and did not tolerate. Xeloda was discontinued. She had repeat PET/CT in 9/16/24 which showed interval resolution of uptake in T6 but new uptake in L1 and new pleural effusion. She completed XRT 5 fractions on 10/1/24 -10/7/24. She was taking Letrozole. PET/CT on 1/7/25 showed several new foci of pathologic FDG uptake in bones, suspicious for biologic tumor activity. She started weekly Taxol, 2 weeks on and 1 week off on 1/31/25, s/p 3 cycles and tolerated. She feels some numbness and tingling in her hands and feet. She does not have n/v/d, mouth sore, chill or fever.    4/16/25 Manoj is here for follow up visit. Her spouse is with her. She has h/o bilateral invasive lobular carcinoma of the breast, ER/NH positive and HER2/deana negative, s/p bilateral mastectomy, bilateral sentinel lymph node biopsy, and right axillary lymph node dissection in 1/2013, s/p adjuvant chemotherapy and adjuvant chest wall radiotherapy, Completed 10-year of endocrine therapy with Letrozole in 7/2023. She developed recurrent disease with a lesion in T6 in 2/2024. CT guided biopsy on 4/17/24 showed metastatic carcinoma consistent from breast primary. She saw Dr. Anaya and received palliative RT 2000cGy to T6 lesion and completed RT on 5/22/24. She took Xeloda for 3 months and did not tolerate. Xeloda was discontinued. She had repeat PET/CT in 9/16/24 which showed interval resolution of uptake in T6 but new uptake in L1 and new pleural effusion. She completed XRT 5 fractions on 10/1/24 -10/7/24. She was taking Letrozole. PET/CT on 1/7/25 showed several new foci of pathologic FDG uptake in bones, suspicious for biologic tumor activity. She started weekly Taxol, 2 weeks on and 1 week off on 1/31/25, s/p 3 cycles and tolerated. She feels some numbness and tingling in her hands and feet. She does not have n/v/d, mouth sore, chill or fever.    4/30/25 Manoj is here for follow up visit and chemotherapy. Her  is with her. She has h/o bilateral invasive lobular carcinoma of the breast, ER/NH positive and HER2/deana negative, s/p bilateral mastectomy, bilateral sentinel lymph node biopsy, and right axillary lymph node dissection in 1/2013, s/p adjuvant chemotherapy and adjuvant chest wall radiotherapy, Completed 10-year of endocrine therapy with Letrozole in 7/2023. She developed recurrent disease with a lesion in T6 in 2/2024. CT guided biopsy on 4/17/24 showed metastatic carcinoma consistent from breast primary. She received palliative RT 2000cGy to T6 lesion and completed RT on 5/22/24. She took Xeloda for 3 months and did not tolerate. Xeloda was discontinued. She had repeat PET/CT in 9/16/24 which showed interval resolution of uptake in T6 but new uptake in L1 and new pleural effusion. She completed XRT 5 fractions on 10/1/24 -10/7/24. She was taking Letrozole. PET/CT on 1/7/25 showed several new foci of pathologic FDG uptake in bones, suspicious for biologic tumor activity. She started weekly Taxol, 2 weeks on and 1 week off on 1/31/25, s/p 4 cycles and tolerated. She has mild neuropathy symptoms. She does not have n/v/d, mouth sore, chill or fever.    5/21/25 Manoj is here for follow up visit and chemotherapy. Her  is with her. She has h/o bilateral invasive lobular carcinoma of the breast, ER/NH positive and HER2/deana negative, s/p bilateral mastectomy, bilateral sentinel lymph node biopsy, and right axillary lymph node dissection in 1/2013, s/p adjuvant chemotherapy and adjuvant chest wall radiotherapy, Completed 10-year of endocrine therapy with Letrozole in 7/2023. She developed recurrent disease with a lesion in T6 in 2/2024. CT guided biopsy on 4/17/24 showed metastatic carcinoma consistent from breast primary, triple negative. She received palliative RT 2000cGy to T6 lesion and completed RT on 5/22/24. She took Xeloda for 3 months and did not tolerate. Xeloda was discontinued. She had repeat PET/CT in 9/16/24 which showed interval resolution of uptake in T6 but new uptake in L1 and new pleural effusion. She completed XRT 5 fractions on 10/1/24 -10/7/24. She was taking Letrozole. PET/CT on 1/7/25 showed several new foci of pathologic FDG uptake in bones, suspicious for biologic tumor activity. She started weekly Taxol, 2 weeks on and 1 week off on 1/31/25, s/p 5 cycles and is tolerating. She feels neuropathy which is getting worse on the right foot. She had explosive diarrhea last Sunday which resolved after taking Imodium. She gets intermittent back pain, managed with Aleeve. She denies nausea, vomiting, fever or chills.  6/11/25 Manoj is here for follow up visit and chemotherapy. Her  is with her. She has h/o bilateral invasive lobular carcinoma of the breast, ER/NH positive and HER2/deana negative, s/p bilateral mastectomy, bilateral sentinel lymph node biopsy, and right axillary lymph node dissection in 1/2013, s/p adjuvant chemotherapy and adjuvant chest wall radiotherapy, Completed 10-year of endocrine therapy with Letrozole in 7/2023. She developed recurrent disease with a lesion in T6 in 2/2024. CT guided biopsy on 4/17/24 showed metastatic carcinoma consistent from breast primary, triple negative. She received palliative RT 2000cGy to T6 lesion and completed RT on 5/22/24. She took Xeloda for 3 months and did not tolerate. Xeloda was discontinued. She had repeat PET/CT in 9/16/24 which showed interval resolution of uptake in T6 but new uptake in L1 and new pleural effusion. She completed XRT 5 fractions on 10/1/24 -10/7/24. She was taking Letrozole. PET/CT on 1/7/25 showed several new foci of pathologic FDG uptake in bones, suspicious for biologic tumor activity. She started weekly Taxol, 2 weeks on and 1 week off on 1/31/25, s/p 6 cycles and is tolerating. She feels neuropathy which is getting worse on the right foot. She had explosive diarrhea last Sunday which resolved after taking Imodium. She gets intermittent back pain, managed with Aleeve. She denies nausea, vomiting, fever or chills.  7/02/25 Manoj is here for follow up visit and chemotherapy. Her  is with her. She has h/o bilateral invasive lobular carcinoma of the breast, ER/NH positive and HER2/deana negative, s/p bilateral mastectomy, bilateral sentinel lymph node biopsy, and right axillary lymph node dissection in 1/2013, s/p adjuvant chemotherapy and adjuvant chest wall radiotherapy, completed 10-year of endocrine therapy with Letrozole in 7/2023. She developed recurrent disease with a lesion in T6 in 2/2024. CT guided biopsy on 4/17/24 showed metastatic carcinoma consistent from breast primary, triple negative. She received palliative RT 2000cGy to T6 lesion and completed RT on 5/22/24. She took Xeloda for 3 months and did not tolerate. Xeloda was discontinued. She had repeat PET/CT in 9/16/24 which showed interval resolution of uptake in T6 but new uptake in L1 and new pleural effusion. She completed XRT 5 fractions on 10/1/24 -10/7/24. She was taking Letrozole. PET/CT on 1/7/25 showed several new foci of pathologic FDG uptake in bones, suspicious for biologic tumor activity. She started weekly Taxol, 2 weeks on and 1 week off on 1/31/25, s/p 7 cycles and is tolerating. She has neuropathy on her feet, refused to take Gabapentin. She gets intermittent back pain, managed with Aleeve. She denies nausea, vomiting, fever or chills.  7/23/25 Manoj is here for follow up visit and chemotherapy. Her  is with her. She has h/o bilateral invasive lobular carcinoma of the breast, ER/NH positive and HER2/deana negative, s/p bilateral mastectomy, bilateral sentinel lymph node biopsy, and right axillary lymph node dissection in 1/2013, s/p adjuvant chemotherapy and adjuvant chest wall radiotherapy, completed 10-year of endocrine therapy with Letrozole in 7/2023. She developed recurrent disease with a lesion in T6 in 2/2024. CT guided biopsy on 4/17/24 showed metastatic carcinoma consistent from breast primary, triple negative. She received palliative RT 2000cGy to T6 lesion and completed RT on 5/22/24. She took Xeloda for 3 months and did not tolerate. Xeloda was discontinued. She had repeat PET/CT in 9/16/24 which showed interval resolution of uptake in T6 but new uptake in L1 and new pleural effusion. She completed XRT 5 fractions on 10/1/24 -10/7/24. She was taking Letrozole. PET/CT on 1/7/25 showed several new foci of pathologic FDG uptake in bones, suspicious for biologic tumor activity. She started weekly Taxol, 2 weeks on and 1 week off on 1/31/25, s/p 7 cycles and is tolerating. She has neuropathy on her feet, refused to take Gabapentin. She gets intermittent back pain, managed with Aleeve. She denies nausea, vomiting, fever or chills.

## 2025-07-16 NOTE — ASSESSMENT
[FreeTextEntry1] : Assessment and Plan: # H/O Bilateral invasive lobular carcinoma of the breast, ER/DE positive and HER2/deana negative, status post bilateral mastectomy, bilateral sentinel lymph node biopsy, and right axillary lymph node dissection, status post adjuvant chemotherapy and adjuvant chest wall radiotherapy, Completed 10-year of endocrine therapy with Letrozole in 7/2023.  - S/P b/l mastectomy. There is no palpable abnormality.  # Recurrent breast cancer(ER/DE/Her 2 negative Biopsy proven) with T6 vertebral body lesion S/P XRT(5/24) now presenting with new L1 lesion S/P XRT  9.24 - MRI spine on 3/11/24 showed a 2.7 cm lesion within the left aspect of the T6 vertebral body and 7 mm marrow replacing lesion within the T11 vertebral body.  - PET/CT on 3/28/24 showed Intense pathologic FDG uptake (SUV 9.3) coregistering with sclerotic lesion on the left side of T6 suspicious for biologic tumor activity. No other sites of abnormal FDG uptake.  - CT guided core bx of the lesion in T6 vertebral body on 4/17/24 showed metastatic carcinoma, favor breast primary, ER/DE negative. Her2 is negative.  - S/P palliative RT 2000cGy to T6 lesion and completed RT on 5/22/24. - She took Xeloda for 3 months, 1500 mg twice a day, one week on and one week off. Due to frequent diarrhea, dose reduced to Xeloda 1000 mg q12h, 1 week on and 1 week off.  She developed multiple side effects and Xeloda was discontinued.  -- PET/CT in 9/16/24 showed interval resolution of uptake in T6 and New uptake in L1 and new pleural effusion.  -- MRI of lumbar spine 9/25/24 showed infiltrative enhancing bone marrow signal abnormality within the T11 and L1 vertebral bodies most compatible with osseous metastatic disease. Mild compression deformity of L1, likely pathologic. -- She received XRT 5 fractions 2000cGy to Vertebrae, Lumbar on 10/1/24 - 10/7/24. -- Recurrent breast cancer in the spine is triple negative. Since her initial breast cancer was hormone receptor and bone met biopsy could have inadequate IHC staining for hormone receptor, she was given Letrozole.  -- Repeat PET/CT on 1/7/25 showed several new foci of pathologic FDG uptake in bones, suspicious for biologic tumor activity. Interval resolution of previously noted small right pleural effusion. -- In light of progression of disease, she switched to Taxol 80 mg weekly, 2 weeks on and one week off. S/P 7 cycles. Repeat PET/CT on 4/23/25 showed stable bony mets with SUV values trending down in some areas and trending up slightly in the other areas. There is no visceral mets.  -- Will proceed with 8th cycle of weekly Taxol, 2 weeks on and 1 week off. CBC today reviewed and is adequate. Mild leucopenia with normal ANC and mild anemia noted, will monitor. -- Will repeat PET scan to eval treatment response, script given. -- Will order liquid NGS and PD-L1 to look for additional treatment options.  -- Continue X-Geva injection monthly. Dental clearance was done.  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 Emphasized to take Gabapentin.  -- Mild elevated AST and ALT. Resolved. Will continue monitoring.  -- Order blood work: CBC, BMP, LFT, Mg, CEA and .  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 She saw neurosurgeon.   #Low back pain.  -- S/P L1 kyphoplasty on 4/11/25. Pain has reduced.  -- NSAIDs as needed.  -- F/U with Neurosurgery, Dr Taylor as scheduled.  RTO in 3 weeks. Case was seen and discussed with Dr. Tovar who agreed with assessment and plan.

## 2025-07-16 NOTE — HISTORY OF PRESENT ILLNESS
[de-identified] : This 68-year-old female is here today for a followup visit.  She has a history of bilateral invasive lobular carcinoma, stage IIIA (pT1c N2a M0) in the right breast, and stage IA (pT1b N0 M0) in the left breast.  Both sided breast cancer were ER/UT positive and HER2/deana negative.  She had bilateral mastectomy, bilateral sentinel lymph node biopsy, and right axillary lymph node dissection at [facility] on 01/08/2013.  She received adjuvant chemotherapy with Adriamycin and Cytoxan followed by paclitaxel.  She also received post mastectomy adjuvant radiotherapy to right side chest wall.  She has been on adjuvant endocrine therapy with Femara since 7/2013  and has been tolerating well.  She had genetic test for BRCA mutations.  She is negative for BRCA1/2 mutations.  In 10/2014, she had a bone density at [facility], which showed osteopenia. Her latest bone density was done 7/21/16 which showed osteopenia. MRI of breasts was done on 1/30/17 which showed no MR evidence of malignancy in either breast. Recommendation: Unless otherwise indicated by clinical findings, annual screening mammography recommended. This can be alternated at 6 month intervals with bilateral screening breast MRI.\par   She has been taking calcium and vitamin D supplements. Latest colonoscopy 11/2016 pt states it was negative. Saw Dr. Garcia 1/2017.\par  She saw Dr. Rankin recently. She is scheduled to have a revision of her lower abdominal fat necrosis on 1/2018.\par  \par   [de-identified] : 6/8/18: She stopped letrozole few months ago as she was found to be in Afib by her cardiologist on Pre-op clearance for abdominal fat necrosis. However her cardiologist () told her that you can resume Letrozole. She had surgery for abdominal fat necrosis and she is going for surgery for incisional hernia by .   12/13/18: The patient is here for followup visit. She has been taking Letrozole daily (since 7/2013) and tolerating well. She had hernia repair surgery. She complains some pain in her knees. She had MRI breast in 1 2017. There was no suspicious finding.  6/14/19; The patient is here for followup visit. She has been taking Letrozole daily (since 7/2013) and tolerating well. She complains some pain in her knees. She does not have other new complains. She has mild elevated calcium. PHT was normal.  12/13/2019 :  The patient is here for follow up. She has been on letrozole daily and tolerating well. She had a bone density 12/2/109 which showed osteopenia of femur and hip. She is not taking calcium and vit D since her last calcium was elevated 10.7. She had a recent US breasts 12/2019 for breast pain which was negative. She was seen by her cardiologist and had blood work in the summer. She was told that she has severe anemia. She started her on iron pills. She denies any vaginal bleeding or rectal bleeding  6/12/2020: The patient is here for follow up. She had bilateral invasive lobular carcinoma of the breast, ER/AK positive and HER2/deana negative, status post bilateral mastectomy, bilateral sentinel lymph node biopsy, and right axillary lymph node dissection in 1/2013, status post adjuvant chemotherapy and adjuvant chest wall radiotherapy, currently on adjuvant endocrine therapy with letrozole and tolerating well. She had a bone density 12/2/19 which showed osteopenia of femur and hip. She is not taking calcium and vit D since her last calcium was elevated 10.7. She had a recent US breasts 12/2019 for breast pain which was negative. She does not have new complains.  12/02/2020: MANOJ is here for follow up visit for bilateral invasive lobular carcinoma of the breast, ER/AK positive and HER2/deana negative, status post bilateral mastectomy, bilateral sentinel lymph node biopsy, and right axillary lymph node dissection in 1/2013, status post adjuvant chemotherapy and adjuvant chest wall radiotherapy, currently on adjuvant endocrine therapy with letrozole and tolerating well besides mild arthralgia. She had a bone density 12/2/19 which showed osteopenia of femur and hip. She continues on Vitamin D daily. She denies any new breast symptoms at this time. In addition, patient was diagnosed with COVID in 3/2020.   06/02/2021: MANOJ is here for follow up visit for bilateral invasive lobular carcinoma of the breast, ER/AK positive and HER2/deana negative, status post bilateral mastectomy, bilateral sentinel lymph node biopsy, and right axillary lymph node dissection in 1/2013, status post adjuvant chemotherapy and adjuvant chest wall radiotherapy, currently on adjuvant endocrine therapy with letrozole since 7/2013 and tolerating well besides mild arthralgia. She had a bone density 12/2/19 which showed osteopenia of femur and hip. She continues on Vitamin D daily. She denies any new breast symptoms at this time. she had right breast skin tag biopsied by dermatologist; which was benign.   1/5/22 MANOJ is here for follow up visit for bilateral invasive lobular carcinoma of the breast, ER/AK positive and HER2/deana negative, status post bilateral mastectomy, bilateral sentinel lymph node biopsy, and right axillary lymph node dissection in 1/2013, status post adjuvant chemotherapy and adjuvant chest wall radiotherapy, currently on adjuvant endocrine therapy with letrozole since 7/2013 and tolerating well besides mild arthralgia. She had a bone density 12/7/21 which showed worsening osteopenia of femur and hip, T score -2.4. She is not complaint with Vitamin D, does not take calcium due to mildly elevated Calcium. She denies any new breast symptoms at this time. She was experiencing lower abdominal and pelvic pain in Sept had US which was unremarkable. In addition, had Thyroid US due to hypercalcemia which was unremarkable, In 8/2021 TSH 2.36, Calcium 10, and PTH was 78.   7/21/22: Manoj is here for follow up visit for bilateral invasive lobular carcinoma of the breast, ER/AK positive and HER2/deana negative, status post bilateral mastectomy, bilateral sentinel lymph node biopsy, and right axillary lymph node dissection in 1/2013, status post adjuvant chemotherapy and adjuvant chest wall radiotherapy, currently on adjuvant endocrine therapy with letrozole since 7/2013 and tolerating well besides mild arthralgia. She had a bone density 12/7/21 which showed worsening osteopenia of femur and hip, T score -2.4. She takes Vitamin D but not calcium because her calcium has been mildly elevated. She was found to have large hiatal hernia and she has had on and off diarrhea for long time. She has been seen by Dr. Borjas.   01/26/2023 Manoj is here for follow up visit for bilateral invasive lobular carcinoma of the breast, ER/AK positive and HER2/deana negative, status post bilateral mastectomy, bilateral sentinel lymph node biopsy, and right axillary lymph node dissection in 1/2013, status post adjuvant chemotherapy and adjuvant chest wall radiotherapy, currently on adjuvant endocrine therapy with letrozole since 7/2013 and tolerating well besides mild arthralgia. She had a bone density 12/7/21 which showed worsening osteopenia of femur and hip, T score -2.4. She takes Vitamin D but not calcium because her calcium has been mildly elevated. She was found to have large hiatal hernia and surgery on 11/21/22. Her Hb drooped to 9.5 g/dl after surgery. She was found to have high serum calcium but repeat calcium was normal. PTH was also within normal limit.  7/20/23 Manoj is here for follow up visit for bilateral invasive lobular carcinoma of the breast, ER/AK positive and HER2/deana negative, status post bilateral mastectomy, bilateral sentinel lymph node biopsy, and right axillary lymph node dissection in 1/2013, status post adjuvant chemotherapy and adjuvant chest wall radiotherapy, currently on adjuvant endocrine therapy with letrozole since 7/2013 and tolerating well besides mild arthralgia. She had a bone density 12/7/21 which showed worsening osteopenia of femur and hip, T score -2.4. She takes Vitamin D but not calcium because her calcium has been mildly elevated. Repeat serum calcium normal, MM panel normal. She was found to have large hiatal hernia and surgery on 11/21/22. Her Hb drooped to 9.5 g/dl after surgery, now normal. After hernia repair she was having symptoms consistent with vagal nerve mediated gastroparesis, she then had endoscopic pyloromyotomy, symptoms resolved.   1/25/2024: Manoj is here for follow up visit for bilateral invasive lobular carcinoma of the breast, ER/AK positive and HER2/deana negative, status post bilateral mastectomy, bilateral sentinel lymph node biopsy, and right axillary lymph node dissection in 1/2013, status post adjuvant chemotherapy and adjuvant chest wall radiotherapy, currently on adjuvant endocrine therapy with letrozole since 7/2013 and tolerating well besides mild arthralgia. She Completed 10 years treatment of Letrozole in 7/2023. She had a bone density 12/8/23 Showed:         ----------------------------------------------------------------- AP Spine (L1-L4)         0.998   -0.4      1.8     Normal Femoral Neck (Left) 0.552   -2.7     -0.7     Osteoporosis Total Hip (Left) 0.676   -2.2     -0.6     Osteopenia -which showed Osteoporosis of femur Neck and Osteopenia in the Hip. She takes Vitamin D.  After hernia repair, she was having symptoms consistent with vagal nerve mediated gastroparesis, she then had endoscopic pyloromyotomy, symptoms resolved.  She starts feeling better now.   3/29/24: Manoj is here for follow up visit. She has h/o bilateral invasive lobular carcinoma of the breast, ER/AK positive and HER2/deana negative, status post bilateral mastectomy, bilateral sentinel lymph node biopsy, and right axillary lymph node dissection in 1/2013, status post adjuvant chemotherapy and adjuvant chest wall radiotherapy, currently on adjuvant endocrine therapy with letrozole since 7/2013. She had CT AP in 2/2024 for abdominal pain which incidentally showed a lesion in T6. She had MRI spine on 3/11/24 which showed a 2.7 cm lesion within the left aspect of the T6 vertebral body and 7 mm marrow replacing lesion within the T11 vertebral body.  PET/CT on 3/28/24 showed Intense pathologic FDG uptake (SUV 9.3) coregistering with sclerotic lesion on the left side of T6 suspicious for biologic tumor activity. No other sites of abnormal FDG uptake. She is here today to discuss further management plan.  4/29/24: Manoj is here for follow up visit. She has h/o bilateral invasive lobular carcinoma of the breast, ER/AK positive and HER2/deana negative, status post bilateral mastectomy, bilateral sentinel lymph node biopsy, and right axillary lymph node dissection in 1/2013, status post adjuvant chemotherapy and adjuvant chest wall radiotherapy, Completed 10-year of endocrine therapy with Letrozole in 7/2023. She had CT AP in 2/2024 for abdominal pain which incidentally showed a lesion in T6. She had MRI spine on 3/11/24 which showed a 2.7 cm lesion within the left aspect of the T6 vertebral body and 7 mm marrow replacing lesion within the T11 vertebral body.  PET/CT on 3/28/24 showed Intense pathologic FDG uptake (SUV 9.3) coregistering with sclerotic lesion on the left side of T6 suspicious for biologic tumor activity. No other sites of abnormal FDG uptake. She had CT guided biopsy on 4/17/24 and is here today to discuss the result.     5/30/24: Manoj is here for follow up visit. She has h/o bilateral invasive lobular carcinoma of the breast, ER/AK positive and HER2/deana negative, status post bilateral mastectomy, bilateral sentinel lymph node biopsy, and right axillary lymph node dissection in 1/2013, status post adjuvant chemotherapy and adjuvant chest wall radiotherapy, Completed 10-year of endocrine therapy with Letrozole in 7/2023. She had CT AP in 2/2024 for abdominal pain which incidentally showed a lesion in T6. She had MRI spine on 3/11/24 which showed a 2.7 cm lesion within the left aspect of the T6 vertebral body and 7 mm marrow replacing lesion within the T11 vertebral body.  PET/CT on 3/28/24 showed Intense pathologic FDG uptake (SUV 9.3) coregistering with sclerotic lesion on the left side of T6 suspicious for biologic tumor activity.  She had CT guided biopsy on 4/17/24 which showed metastatic carcinoma consistent from breast primary. She saw Dr. Anaya and received palliative RT 2000cGy to T6 lesion and completed RT on 5/22/24.  6/27/24: Manoj is here for follow up visit. She has h/o bilateral invasive lobular carcinoma of the breast, ER/AK positive and HER2/deana negative, status post bilateral mastectomy, bilateral sentinel lymph node biopsy, and right axillary lymph node dissection in 1/2013, status post adjuvant chemotherapy and adjuvant chest wall radiotherapy, Completed 10-year of endocrine therapy with Letrozole in 7/2023. She had CT AP in 2/2024 for abdominal pain which incidentally showed a lesion in T6. She had MRI spine on 3/11/24 which showed a 2.7 cm lesion within the left aspect of the T6 vertebral body and 7 mm marrow replacing lesion within the T11 vertebral body.  PET/CT on 3/28/24 showed Intense pathologic FDG uptake (SUV 9.3) coregistering with sclerotic lesion on the left side of T6 suspicious for biologic tumor activity.  She had CT guided biopsy on 4/17/24 which showed metastatic carcinoma consistent from breast primary. She saw Dr. Anaya and received palliative RT 2000cGy to T6 lesion and completed RT on 5/22/24. She started on Xeloda and had 1st week treatment. She had loose stool but otherwise tolerated well.   08/01/24: bilateral sentinel lymph node biopsy, and right axillary lymph node dissection in 1/2013, status post adjuvant chemotherapy and adjuvant chest wall radiotherapy, Completed 10-year of endocrine therapy with Letrozole in 7/2023. She had CT AP in 2/2024 for abdominal pain which incidentally showed a lesion in T6. She had MRI spine on 3/11/24 which showed a 2.7 cm lesion within the left aspect of the T6 vertebral body and 7 mm marrow replacing lesion within the T11 vertebral body.  PET/CT on 3/28/24 showed Intense pathologic FDG uptake (SUV 9.3) coregistering with sclerotic lesion on the left side of T6 suspicious for biologic tumor activity.  She had CT guided biopsy on 4/17/24 which showed metastatic carcinoma consistent from breast primary. She saw Dr. Anaya and received palliative RT 2000cGy to T6 lesion and completed RT on 5/22/24. She started on Xeloda and had 1st week of June 2024. She is complaining of persistence diarrhea despite Imodium intake, currently on Xeloda 3tabs every 12 hrs for one week on and one week off.   8/29/24: Manoj is here for follow up visit. She has h/o bilateral invasive lobular carcinoma of the breast, ER/AK positive and HER2/deana negative, status post bilateral mastectomy, bilateral sentinel lymph node biopsy, and right axillary lymph node dissection in 1/2013, status post adjuvant chemotherapy and adjuvant chest wall radiotherapy, Completed 10-year of endocrine therapy with Letrozole in 7/2023. She had CT AP in 2/2024 for abdominal pain which incidentally showed a lesion in T6. She had MRI spine on 3/11/24 which showed a 2.7 cm lesion within the left aspect of the T6 vertebral body and 7 mm marrow replacing lesion within the T11 vertebral body.  PET/CT on 3/28/24 showed Intense pathologic FDG uptake (SUV 9.3) coregistering with sclerotic lesion on the left side of T6 suspicious for biologic tumor activity.  She had CT guided biopsy on 4/17/24 which showed metastatic carcinoma consistent from breast primary. She saw Dr. Anaya and received palliative RT 2000cGy to T6 lesion and completed RT on 5/22/24. She has been on Xeloda and did not tolerate well. She complains diarrhea, weight loss and eye irritation.   10/10/24 Manoj is here for follow up visit. She has h/o bilateral invasive lobular carcinoma of the breast, ER/AK positive and HER2/deana negative, status post bilateral mastectomy, bilateral sentinel lymph node biopsy, and right axillary lymph node dissection in 1/2013, status post adjuvant chemotherapy and adjuvant chest wall radiotherapy, Completed 10-year of endocrine therapy with Letrozole in 7/2023. She had CT AP in 2/2024 for abdominal pain which incidentally showed a lesion in T6. She had MRI spine on 3/11/24 which showed a 2.7 cm lesion within the left aspect of the T6 vertebral body and 7 mm marrow replacing lesion within the T11 vertebral body.  PET/CT on 3/28/24 showed Intense pathologic FDG uptake (SUV 9.3) coregistering with sclerotic lesion on the left side of T6 suspicious for biologic tumor activity.  She had CT guided biopsy on 4/17/24 which showed metastatic carcinoma consistent from breast primary. She saw Dr. Anaya and received palliative RT 2000cGy to T6 lesion and completed RT on 5/22/24. She took Xeloda for 3 months and did not tolerate well. Xeloda was discontinued. She had a PET scan in 9.24 which showed interval resolution of uptake in T6 and New uptake in L1 and new pleural effussion. She completed XRT 5 fractions in 9.24.  She is here for follow up.  11/11/24 Manoj is here for follow up visit. She has h/o bilateral invasive lobular carcinoma of the breast, ER/AK positive and HER2/deana negative, status post bilateral mastectomy, bilateral sentinel lymph node biopsy, and right axillary lymph node dissection in 1/2013, status post adjuvant chemotherapy and adjuvant chest wall radiotherapy, Completed 10-year of endocrine therapy with Letrozole in 7/2023. She developed recurrent disease with a lesion in T6 in 2/2024. CT guided biopsy on 4/17/24 showed metastatic carcinoma consistent from breast primary. She saw Dr. Anaya and received palliative RT 2000cGy to T6 lesion and completed RT on 5/22/24. She took Xeloda for 3 months and did not tolerate well. Xeloda was discontinued. She had repeat PET/CT in 9.24 which showed interval resolution of uptake in T6 but new uptake in L1 and new pleural effusion. She completed XRT 5 fractions. She complains low back pain.   12/30/24 Manoj is here for follow up visit. Her spouse is with her. She has h/o bilateral invasive lobular carcinoma of the breast, ER/AK positive and HER2/deana negative, s/p bilateral mastectomy, bilateral sentinel lymph node biopsy, and right axillary lymph node dissection in 1/2013, s/p adjuvant chemotherapy and adjuvant chest wall radiotherapy, Completed 10-year of endocrine therapy with Letrozole in 7/2023. She developed recurrent disease with a lesion in T6 in 2/2024. CT guided biopsy on 4/17/24 showed metastatic carcinoma consistent from breast primary. She saw Dr. Anaya and received palliative RT 2000cGy to T6 lesion and completed RT on 5/22/24. She took Xeloda for 3 months and did not tolerate well. Xeloda was discontinued. She had repeat PET/CT in 9/16/24 which showed interval resolution of uptake in T6 but new uptake in L1 and new pleural effusion. MR Lumbar spine on 9/25/24 showed Infiltrative enhancing bone marrow signal abnormality within the T11 and L1 vertebral bodies most compatible with osseous metastatic disease. Mild compression deformity of L1, likely pathologic. Multilevel degenerative changes as described. She completed XRT 5 fractions on 10/1/24 -10/7/24. She has a history of fall on 9/2024. CT chest 12/12/24 showed no pleural effusion. She complains low back pain, taking Aleeve as needed with relief.  1/14/25: Manoj is here for follow up visit. Her spouse is with her. She has h/o bilateral invasive lobular carcinoma of the breast, ER/AK positive and HER2/deana negative, s/p bilateral mastectomy, bilateral sentinel lymph node biopsy, and right axillary lymph node dissection in 1/2013, s/p adjuvant chemotherapy and adjuvant chest wall radiotherapy, Completed 10-year of endocrine therapy with Letrozole in 7/2023. She developed recurrent disease with a lesion in T6 in 2/2024. CT guided biopsy on 4/17/24 showed metastatic carcinoma consistent from breast primary. She saw Dr. Anaya and received palliative RT 2000cGy to T6 lesion and completed RT on 5/22/24. She took Xeloda for 3 months and did not tolerate. Xeloda was discontinued. She had repeat PET/CT in 9/16/24 which showed interval resolution of uptake in T6 but new uptake in L1 and new pleural effusion.  She completed XRT 5 fractions on 10/1/24 -10/7/24. She has been taking Letrozole. She feels some aches and pains.   2/20/25 Manoj is here for follow up visit. Her spouse is with her. She has h/o bilateral invasive lobular carcinoma of the breast, ER/AK positive and HER2/deana negative, s/p bilateral mastectomy, bilateral sentinel lymph node biopsy, and right axillary lymph node dissection in 1/2013, s/p adjuvant chemotherapy and adjuvant chest wall radiotherapy, Completed 10-year of endocrine therapy with Letrozole in 7/2023. She developed recurrent disease with a lesion in T6 in 2/2024. CT guided biopsy on 4/17/24 showed metastatic carcinoma consistent from breast primary. She saw Dr. Anaya and received palliative RT 2000cGy to T6 lesion and completed RT on 5/22/24. She took Xeloda for 3 months and did not tolerate. Xeloda was discontinued. She had repeat PET/CT in 9/16/24 which showed interval resolution of uptake in T6 but new uptake in L1 and new pleural effusion. She completed XRT 5 fractions on 10/1/24 -10/7/24. She has been taking Letrozole. PET/CT on 1/7/25 showed several new foci of pathologic FDG uptake in bones, suspicious for biologic tumor activity. Interval resolution of previously noted small right pleural effusion. She feels some aches and pains. She started weekly Taxol, 2 weeks on and 1 week off on 1/31/25, s/p cycle #1 and is tolerating well except for diarrhea yesterday, managed with Imodium. She denies nausea, vomiting, or numbness or tingling of hands/feet. She saw Dr Taylor and is planned for L1 kyphoplasty on 3/18/25.  3/13/25 Manoj is here for follow up visit. Her spouse is with her. She has h/o bilateral invasive lobular carcinoma of the breast, ER/AK positive and HER2/deana negative, s/p bilateral mastectomy, bilateral sentinel lymph node biopsy, and right axillary lymph node dissection in 1/2013, s/p adjuvant chemotherapy and adjuvant chest wall radiotherapy, Completed 10-year of endocrine therapy with Letrozole in 7/2023. She developed recurrent disease with a lesion in T6 in 2/2024. CT guided biopsy on 4/17/24 showed metastatic carcinoma consistent from breast primary. She saw Dr. Anaya and received palliative RT 2000cGy to T6 lesion and completed RT on 5/22/24. She took Xeloda for 3 months and did not tolerate. Xeloda was discontinued. She had repeat PET/CT in 9/16/24 which showed interval resolution of uptake in T6 but new uptake in L1 and new pleural effusion. She completed XRT 5 fractions on 10/1/24 -10/7/24. She has been taking Letrozole. PET/CT on 1/7/25 showed several new foci of pathologic FDG uptake in bones, suspicious for biologic tumor activity. She started weekly Taxol, 2 weeks on and 1 week off on 1/31/25, s/p 2 cycles and tolerated.    4/3/25 Manoj is here for follow up visit. Her spouse is with her. She has h/o bilateral invasive lobular carcinoma of the breast, ER/AK positive and HER2/deana negative, s/p bilateral mastectomy, bilateral sentinel lymph node biopsy, and right axillary lymph node dissection in 1/2013, s/p adjuvant chemotherapy and adjuvant chest wall radiotherapy, Completed 10-year of endocrine therapy with Letrozole in 7/2023. She developed recurrent disease with a lesion in T6 in 2/2024. CT guided biopsy on 4/17/24 showed metastatic carcinoma consistent from breast primary. She saw Dr. Anaya and received palliative RT 2000cGy to T6 lesion and completed RT on 5/22/24. She took Xeloda for 3 months and did not tolerate. Xeloda was discontinued. She had repeat PET/CT in 9/16/24 which showed interval resolution of uptake in T6 but new uptake in L1 and new pleural effusion. She completed XRT 5 fractions on 10/1/24 -10/7/24. She was taking Letrozole. PET/CT on 1/7/25 showed several new foci of pathologic FDG uptake in bones, suspicious for biologic tumor activity. She started weekly Taxol, 2 weeks on and 1 week off on 1/31/25, s/p 3 cycles and tolerated. She feels some numbness and tingling in her hands and feet. She does not have n/v/d, mouth sore, chill or fever.    4/16/25 Manoj is here for follow up visit. Her spouse is with her. She has h/o bilateral invasive lobular carcinoma of the breast, ER/AK positive and HER2/deana negative, s/p bilateral mastectomy, bilateral sentinel lymph node biopsy, and right axillary lymph node dissection in 1/2013, s/p adjuvant chemotherapy and adjuvant chest wall radiotherapy, Completed 10-year of endocrine therapy with Letrozole in 7/2023. She developed recurrent disease with a lesion in T6 in 2/2024. CT guided biopsy on 4/17/24 showed metastatic carcinoma consistent from breast primary. She saw Dr. Anaya and received palliative RT 2000cGy to T6 lesion and completed RT on 5/22/24. She took Xeloda for 3 months and did not tolerate. Xeloda was discontinued. She had repeat PET/CT in 9/16/24 which showed interval resolution of uptake in T6 but new uptake in L1 and new pleural effusion. She completed XRT 5 fractions on 10/1/24 -10/7/24. She was taking Letrozole. PET/CT on 1/7/25 showed several new foci of pathologic FDG uptake in bones, suspicious for biologic tumor activity. She started weekly Taxol, 2 weeks on and 1 week off on 1/31/25, s/p 3 cycles and tolerated. She feels some numbness and tingling in her hands and feet. She does not have n/v/d, mouth sore, chill or fever.    4/30/25 Manoj is here for follow up visit and chemotherapy. Her  is with her. She has h/o bilateral invasive lobular carcinoma of the breast, ER/AK positive and HER2/deana negative, s/p bilateral mastectomy, bilateral sentinel lymph node biopsy, and right axillary lymph node dissection in 1/2013, s/p adjuvant chemotherapy and adjuvant chest wall radiotherapy, Completed 10-year of endocrine therapy with Letrozole in 7/2023. She developed recurrent disease with a lesion in T6 in 2/2024. CT guided biopsy on 4/17/24 showed metastatic carcinoma consistent from breast primary. She received palliative RT 2000cGy to T6 lesion and completed RT on 5/22/24. She took Xeloda for 3 months and did not tolerate. Xeloda was discontinued. She had repeat PET/CT in 9/16/24 which showed interval resolution of uptake in T6 but new uptake in L1 and new pleural effusion. She completed XRT 5 fractions on 10/1/24 -10/7/24. She was taking Letrozole. PET/CT on 1/7/25 showed several new foci of pathologic FDG uptake in bones, suspicious for biologic tumor activity. She started weekly Taxol, 2 weeks on and 1 week off on 1/31/25, s/p 4 cycles and tolerated. She has mild neuropathy symptoms. She does not have n/v/d, mouth sore, chill or fever.    5/21/25 Manoj is here for follow up visit and chemotherapy. Her  is with her. She has h/o bilateral invasive lobular carcinoma of the breast, ER/AK positive and HER2/deana negative, s/p bilateral mastectomy, bilateral sentinel lymph node biopsy, and right axillary lymph node dissection in 1/2013, s/p adjuvant chemotherapy and adjuvant chest wall radiotherapy, Completed 10-year of endocrine therapy with Letrozole in 7/2023. She developed recurrent disease with a lesion in T6 in 2/2024. CT guided biopsy on 4/17/24 showed metastatic carcinoma consistent from breast primary, triple negative. She received palliative RT 2000cGy to T6 lesion and completed RT on 5/22/24. She took Xeloda for 3 months and did not tolerate. Xeloda was discontinued. She had repeat PET/CT in 9/16/24 which showed interval resolution of uptake in T6 but new uptake in L1 and new pleural effusion. She completed XRT 5 fractions on 10/1/24 -10/7/24. She was taking Letrozole. PET/CT on 1/7/25 showed several new foci of pathologic FDG uptake in bones, suspicious for biologic tumor activity. She started weekly Taxol, 2 weeks on and 1 week off on 1/31/25, s/p 5 cycles and is tolerating. She feels neuropathy which is getting worse on the right foot. She had explosive diarrhea last Sunday which resolved after taking Imodium. She gets intermittent back pain, managed with Aleeve. She denies nausea, vomiting, fever or chills.  6/11/25 Manoj is here for follow up visit and chemotherapy. Her  is with her. She has h/o bilateral invasive lobular carcinoma of the breast, ER/AK positive and HER2/deana negative, s/p bilateral mastectomy, bilateral sentinel lymph node biopsy, and right axillary lymph node dissection in 1/2013, s/p adjuvant chemotherapy and adjuvant chest wall radiotherapy, Completed 10-year of endocrine therapy with Letrozole in 7/2023. She developed recurrent disease with a lesion in T6 in 2/2024. CT guided biopsy on 4/17/24 showed metastatic carcinoma consistent from breast primary, triple negative. She received palliative RT 2000cGy to T6 lesion and completed RT on 5/22/24. She took Xeloda for 3 months and did not tolerate. Xeloda was discontinued. She had repeat PET/CT in 9/16/24 which showed interval resolution of uptake in T6 but new uptake in L1 and new pleural effusion. She completed XRT 5 fractions on 10/1/24 -10/7/24. She was taking Letrozole. PET/CT on 1/7/25 showed several new foci of pathologic FDG uptake in bones, suspicious for biologic tumor activity. She started weekly Taxol, 2 weeks on and 1 week off on 1/31/25, s/p 6 cycles and is tolerating. She feels neuropathy which is getting worse on the right foot. She had explosive diarrhea last Sunday which resolved after taking Imodium. She gets intermittent back pain, managed with Aleeve. She denies nausea, vomiting, fever or chills.  7/02/25 Manoj is here for follow up visit and chemotherapy. Her  is with her. She has h/o bilateral invasive lobular carcinoma of the breast, ER/AK positive and HER2/deana negative, s/p bilateral mastectomy, bilateral sentinel lymph node biopsy, and right axillary lymph node dissection in 1/2013, s/p adjuvant chemotherapy and adjuvant chest wall radiotherapy, completed 10-year of endocrine therapy with Letrozole in 7/2023. She developed recurrent disease with a lesion in T6 in 2/2024. CT guided biopsy on 4/17/24 showed metastatic carcinoma consistent from breast primary, triple negative. She received palliative RT 2000cGy to T6 lesion and completed RT on 5/22/24. She took Xeloda for 3 months and did not tolerate. Xeloda was discontinued. She had repeat PET/CT in 9/16/24 which showed interval resolution of uptake in T6 but new uptake in L1 and new pleural effusion. She completed XRT 5 fractions on 10/1/24 -10/7/24. She was taking Letrozole. PET/CT on 1/7/25 showed several new foci of pathologic FDG uptake in bones, suspicious for biologic tumor activity. She started weekly Taxol, 2 weeks on and 1 week off on 1/31/25, s/p 7 cycles and is tolerating. She has neuropathy on her feet, refused to take Gabapentin. She gets intermittent back pain, managed with Aleeve. She denies nausea, vomiting, fever or chills.  7/23/25 Manoj is here for follow up visit and chemotherapy. Her  is with her. She has h/o bilateral invasive lobular carcinoma of the breast, ER/AK positive and HER2/deana negative, s/p bilateral mastectomy, bilateral sentinel lymph node biopsy, and right axillary lymph node dissection in 1/2013, s/p adjuvant chemotherapy and adjuvant chest wall radiotherapy, completed 10-year of endocrine therapy with Letrozole in 7/2023. She developed recurrent disease with a lesion in T6 in 2/2024. CT guided biopsy on 4/17/24 showed metastatic carcinoma consistent from breast primary, triple negative. She received palliative RT 2000cGy to T6 lesion and completed RT on 5/22/24. She took Xeloda for 3 months and did not tolerate. Xeloda was discontinued. She had repeat PET/CT in 9/16/24 which showed interval resolution of uptake in T6 but new uptake in L1 and new pleural effusion. She completed XRT 5 fractions on 10/1/24 -10/7/24. She was taking Letrozole. PET/CT on 1/7/25 showed several new foci of pathologic FDG uptake in bones, suspicious for biologic tumor activity. She started weekly Taxol, 2 weeks on and 1 week off on 1/31/25, s/p 7 cycles and is tolerating. She has neuropathy on her feet, refused to take Gabapentin. She gets intermittent back pain, managed with Aleeve. She denies nausea, vomiting, fever or chills.

## 2025-07-16 NOTE — PHYSICAL EXAM
[Fully active, able to carry on all pre-disease performance without restriction] : Status 0 - Fully active, able to carry on all pre-disease performance without restriction [Normal] : affect appropriate [de-identified] : Right chestchemoport, intact and no erythema noted. [de-identified] :  Status post bilateral mastectomy and autologous tissue reconstruction. There is no skin lesion and no palpable axillary lymphadenopathy. [de-identified] : Lower abdominal fat necrosis. Abdominal scar present.  [de-identified] : Low back pain 2' metastatic disease.

## 2025-07-16 NOTE — PHYSICAL EXAM
[Fully active, able to carry on all pre-disease performance without restriction] : Status 0 - Fully active, able to carry on all pre-disease performance without restriction [Normal] : affect appropriate [de-identified] : Right chestchemoport, intact and no erythema noted. [de-identified] :  Status post bilateral mastectomy and autologous tissue reconstruction. There is no skin lesion and no palpable axillary lymphadenopathy. [de-identified] : Lower abdominal fat necrosis. Abdominal scar present.  [de-identified] : Low back pain 2' metastatic disease.